# Patient Record
Sex: MALE | Race: WHITE | NOT HISPANIC OR LATINO | Employment: OTHER | URBAN - METROPOLITAN AREA
[De-identification: names, ages, dates, MRNs, and addresses within clinical notes are randomized per-mention and may not be internally consistent; named-entity substitution may affect disease eponyms.]

---

## 2018-01-25 ENCOUNTER — OFFICE VISIT (OUTPATIENT)
Dept: ORTHOPEDIC SURGERY | Facility: CLINIC | Age: 73
End: 2018-01-25

## 2018-01-25 VITALS — BODY MASS INDEX: 28.91 KG/M2 | HEIGHT: 69 IN | TEMPERATURE: 98.3 F | WEIGHT: 195.2 LBS

## 2018-01-25 DIAGNOSIS — M17.12 PRIMARY OSTEOARTHRITIS OF LEFT KNEE: Primary | ICD-10-CM

## 2018-01-25 PROCEDURE — 99203 OFFICE O/P NEW LOW 30 MIN: CPT | Performed by: ORTHOPAEDIC SURGERY

## 2018-01-25 RX ORDER — GABAPENTIN 100 MG/1
100 CAPSULE ORAL AS NEEDED
COMMUNITY
Start: 2016-12-12 | End: 2022-02-21

## 2018-01-25 RX ORDER — SODIUM PHOSPHATE,MONO-DIBASIC 19G-7G/118
ENEMA (ML) RECTAL
COMMUNITY
End: 2020-02-20

## 2018-01-25 RX ORDER — EZETIMIBE 10 MG/1
10 TABLET ORAL NIGHTLY
Refills: 5 | COMMUNITY
Start: 2017-12-27

## 2018-01-25 RX ORDER — NEBIVOLOL HYDROCHLORIDE 2.5 MG/1
2.5 TABLET ORAL DAILY
Refills: 10 | COMMUNITY
Start: 2017-10-30 | End: 2022-02-21

## 2018-01-25 RX ORDER — MELATONIN
2000 DAILY
COMMUNITY
End: 2020-02-20

## 2018-01-25 RX ORDER — ASPIRIN 81 MG/1
81 TABLET, CHEWABLE ORAL DAILY
COMMUNITY
End: 2020-03-07 | Stop reason: HOSPADM

## 2018-01-25 NOTE — PROGRESS NOTES
Patient: Yunior Anguiano  YOB: 1945 72 y.o. male  Medical Record Number: [id number]    Chief Complaints:   Chief Complaint   Patient presents with   • Left Knee - Establish Care, Pain       History of Present Illness:Yunior Anguiano is a 72 y.o. male who presents with Complaints of intermittent left knee pain.  His right knee replaced by Dr. Obrien about 15 years ago and that has done well.  He does have some issues with his left knee when he arises from a seated position he feels as though the knee has to shift into place.  It takes him 30 seconds or so before he can get going.  Once he warms up he does okay and it really doesn't limit him.  He describes the general aching discomfort but it's not severe.  He still does daily exercises to keep his quad strong and gets around fairly well.    Allergies:   Allergies   Allergen Reactions   • Amlodipine Besylate Anaphylaxis and Hives   • Cefdinir      rash   • Erythromycin Base      vomits   • Losartan      Itching and it stopped with he stopped the losartan   • Statins      ACHES cannot take   Muscles get WEAK       Medications:   Current Outpatient Prescriptions   Medication Sig Dispense Refill   • aspirin 81 MG chewable tablet Chew 81 mg.     • BYSTOLIC 2.5 MG tablet TAKE 1 TABLET BY MOUTH ONCE DAILY  10   • cholecalciferol (VITAMIN D3) 1000 units tablet Take 1,000 Units by mouth Daily.     • gabapentin (NEURONTIN) 100 MG capsule Take 100 mg by mouth.     • glucosamine-chondroitin 500-400 MG capsule capsule Take  by mouth 3 (Three) Times a Day With Meals.     • ZETIA 10 MG tablet TAKE 1/2 TAB (5MG) BY MOUTH DAILY  5     No current facility-administered medications for this visit.          The following portions of the patient's history were reviewed and updated as appropriate: allergies, current medications, past family history, past medical history, past social history, past surgical history and problem list.    Review of Systems:   A 14 point  "review of systems was performed. All systems negative except pertinent positives/negative listed in HPI above    Physical Exam:   Vitals:    01/25/18 0845   Temp: 98.3 °F (36.8 °C)   TempSrc: Temporal Artery    Weight: 88.5 kg (195 lb 3.2 oz)   Height: 174 cm (68.5\")       General: A and O x 3, ASA, NAD    SCLERA:    Normal    DENTITION:   Normal  Knee:  left    ALIGNMENT:     Varus  ,   Patella  tracks  midline    GAIT:    Antalgic With slight varus thrust    SKIN:    No abnormality    RANGE OF MOTION:   0  -  120   DEG    STRENGTH:   4  / 5    LIGAMENTS:    No varus / valgus instability.   Negative  Lachman.    MENISCUS:     Negative   Martha       DISTAL PULSES:    Paplable    DISTAL SENSATION :   Intact    LYMPHATICS:     No   lymphadenopathy    OTHER:          - no  effusion      - Crepitance with ROM          Radiology:  Xrays 3views left knee (ap,lateral, sunrise) recently taken at outside institution were reviewed demonstrating advanced varus osteoarthritis with bone on bone articulation, subchondral cysts, and periarticular osteophytes of the left knee.  There is a well-positioned right total knee without complicating factors noted.  There are no previous films for comparison.    Assessment/Plan: Left knee advanced OA.  His symptoms are surprisingly well controlled.  Continue quad strengthening exercises.  I like to check him back in about 6 months no x-rays needed.  He does understand he has severe advanced arthritis and likely will progress to knee replacement relatively soon.      Obed Ramos MD  1/25/2018  "

## 2018-09-20 ENCOUNTER — OFFICE VISIT (OUTPATIENT)
Dept: ORTHOPEDIC SURGERY | Facility: CLINIC | Age: 73
End: 2018-09-20

## 2018-09-20 VITALS — HEIGHT: 69 IN | BODY MASS INDEX: 28.88 KG/M2 | WEIGHT: 195 LBS | TEMPERATURE: 98.6 F

## 2018-09-20 DIAGNOSIS — G89.29 HIP PAIN, CHRONIC, RIGHT: ICD-10-CM

## 2018-09-20 DIAGNOSIS — Z96.651 HISTORY OF TOTAL KNEE ARTHROPLASTY, RIGHT: ICD-10-CM

## 2018-09-20 DIAGNOSIS — M25.551 HIP PAIN, CHRONIC, RIGHT: ICD-10-CM

## 2018-09-20 DIAGNOSIS — M17.12 PRIMARY OSTEOARTHRITIS OF LEFT KNEE: Primary | ICD-10-CM

## 2018-09-20 PROCEDURE — 99213 OFFICE O/P EST LOW 20 MIN: CPT | Performed by: ORTHOPAEDIC SURGERY

## 2018-09-20 PROCEDURE — 73562 X-RAY EXAM OF KNEE 3: CPT | Performed by: ORTHOPAEDIC SURGERY

## 2018-09-20 PROCEDURE — 73502 X-RAY EXAM HIP UNI 2-3 VIEWS: CPT | Performed by: ORTHOPAEDIC SURGERY

## 2018-09-20 RX ORDER — MELOXICAM 15 MG/1
TABLET ORAL
Qty: 30 TABLET | Refills: 3 | Status: SHIPPED | OUTPATIENT
Start: 2018-09-20 | End: 2020-02-20

## 2018-09-20 RX ORDER — HYDROCODONE BITARTRATE AND ACETAMINOPHEN 5; 325 MG/1; MG/1
TABLET ORAL
COMMUNITY
Start: 2018-03-12 | End: 2020-02-20

## 2018-09-20 NOTE — PROGRESS NOTES
Patient: Yunior Anguiano  YOB: 1945 72 y.o. male  Medical Record Number: [id number]    Chief Complaints:   Chief Complaint   Patient presents with   • Left Knee - Follow-up   • Right Knee - Pain   • Right Hip - Pain       History of Present Illness:Yunior Anguiano is a 72 y.o. male who presents with complaints of bilateral knee pain.  Right is more recent.  He began to develop pain about 2 weeks she'll when he was lifting multiple bags of 40 pound daily R.  He noticed a soreness about the proximal aspect of the knee joint after doing this.  Also complains of some chronic left medial knee pain which is moderate to severe at times.  He states the knee was to give out on him at times.  Pain is described as a constant ache worse with activity.  Also complains of moderate intermittent ache around the hip and groin region on the right side worse with activity.    Allergies:   Allergies   Allergen Reactions   • Amlodipine Besylate Anaphylaxis and Hives   • Cefdinir      rash   • Erythromycin Base      vomits   • Losartan      Itching and it stopped with he stopped the losartan   • Statins      ACHES cannot take   Muscles get WEAK       Medications:   Current Outpatient Prescriptions   Medication Sig Dispense Refill   • aspirin 81 MG chewable tablet Chew 81 mg.     • BYSTOLIC 2.5 MG tablet TAKE 1 TABLET BY MOUTH ONCE DAILY  10   • cholecalciferol (VITAMIN D3) 1000 units tablet Take 1,000 Units by mouth Daily.     • gabapentin (NEURONTIN) 100 MG capsule Take 100 mg by mouth.     • glucosamine-chondroitin 500-400 MG capsule capsule Take  by mouth 3 (Three) Times a Day With Meals.     • ZETIA 10 MG tablet TAKE 1/2 TAB (5MG) BY MOUTH DAILY  5   • HYDROcodone-acetaminophen (NORCO) 5-325 MG per tablet TAKE 1 TABLET BY MOUTH EVERY 6 HRS FOR 7 DAYS AS NEEDED FOR PAIN       No current facility-administered medications for this visit.          The following portions of the patient's history were reviewed and  "updated as appropriate: allergies, current medications, past family history, past medical history, past social history, past surgical history and problem list.    Review of Systems:   A 14 point review of systems was performed. All systems negative except pertinent positives/negative listed in HPI above    Physical Exam:   Vitals:    09/20/18 1105   Temp: 98.6 °F (37 °C)   TempSrc: Temporal Artery    Weight: 88.5 kg (195 lb)   Height: 175.3 cm (69\")       General: A and O x 3, ASA, NAD    SCLERA:    Normal    DENTITION:   Normal  Knee:  right    ALIGNMENT:     Neutral  ,   Patella  tracks  Midline without crepitance    GAIT:    Nonantalgic    SKIN:    Well healed midline incision, no erythema or fluctuance    RANGE OF MOTION:   0  -  120   DEG    STRENGTH:   4+  / 5    LIGAMENTS:    No varus / valgus instability.   No  Flexion   instability.       DISTAL PULSES:    Paplable    DISTAL SENSATION :   Intact    LYMPHATICS:     No   lymphadenopathy    OTHER:     1+   Effusion      Calf soft / nontender ,   Negative Nelson's sign               Radiology:  Xrays 3views both knees(ap,lateral, sunrise) were ordered and reviewed for evaluation of knee pain demonstrating left knee advanced varus osteoarthritis with bone on bone articulation, subchondral cysts, and periarticular osteophytes.  The right knee shows well-positioned total knee the joint polyethylene space is somewhat thin but not asymmetric.  The anterior flange of the femoral component is moderately prominent.  I compared these x-rays to previous knee x-rays and there has been no significant change    X-rays 2 views of the right hip AP and lateral ordered and reviewed for rotation hip pain.  He does have a prominent CAM lesion which may be causing some impingement.  There are no previous films for comparison    Assessment/Plan: Left knee advanced end-stage osteoarthritis.  I'll send him to physical therapy.  He will go on to require knee replacement in the " relatively near future.  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  There is a chance down the road this will require polyethylene exchange will continue to monitor for now    Right hip impingement with intermittent irritation I recommended hip stretching exercises.      Obed Ramos MD  9/20/2018

## 2018-09-26 ENCOUNTER — HOSPITAL ENCOUNTER (OUTPATIENT)
Dept: PHYSICAL THERAPY | Facility: HOSPITAL | Age: 73
Setting detail: THERAPIES SERIES
Discharge: HOME | End: 2018-09-26
Attending: ORTHOPAEDIC SURGERY

## 2018-09-26 DIAGNOSIS — M25.561 ACUTE PAIN OF RIGHT KNEE: Primary | ICD-10-CM

## 2018-09-26 DIAGNOSIS — M25.551 RIGHT HIP PAIN: ICD-10-CM

## 2018-09-26 DIAGNOSIS — M17.12 PRIMARY OSTEOARTHRITIS OF LEFT KNEE: ICD-10-CM

## 2018-09-26 DIAGNOSIS — R26.2 DIFFICULTY WALKING: ICD-10-CM

## 2018-09-26 PROCEDURE — 97110 THERAPEUTIC EXERCISES: CPT | Performed by: PHYSICAL THERAPIST

## 2018-09-26 PROCEDURE — G8979 MOBILITY GOAL STATUS: HCPCS | Performed by: PHYSICAL THERAPIST

## 2018-09-26 PROCEDURE — G8978 MOBILITY CURRENT STATUS: HCPCS | Performed by: PHYSICAL THERAPIST

## 2018-09-26 PROCEDURE — 97162 PT EVAL MOD COMPLEX 30 MIN: CPT | Performed by: PHYSICAL THERAPIST

## 2018-09-26 NOTE — THERAPY EVALUATION
Outpatient Physical Therapy Ortho Initial Evaluation  [facility]     Patient Name: Yunior Anguiano  : 1945  MRN: [id number]  Today's Date: 2018      Visit Date: 2018    There is no problem list on file for this patient.       Past Medical History:   Diagnosis Date   • Cardiac disease    • Sleep apnea         Past Surgical History:   Procedure Laterality Date   • CHOLECYSTECTOMY     • CORONARY STENT PLACEMENT      mid LAD   • FOOT SURGERY Left     bone growth removal   • HEMORRHOIDECTOMY     • JOINT REPLACEMENT Right     knee   • KNEE SURGERY Right     ACL repair       Visit Dx:     ICD-10-CM ICD-9-CM   1. Acute pain of right knee M25.561 719.46   2. Right hip pain M25.551 719.45   3. Primary osteoarthritis of left knee M17.12 715.16   4. Difficulty walking R26.2 719.7             Patient History     Row Name 18 0900             History    Chief Complaint Pain;Difficulty Walking;Difficulty with daily activities  -MP      Type of Pain Hip pain;Knee pain  -MP      Date Current Problem(s) Began 18  -MP      Brief Description of Current Complaint Manjinder reports that on 2018 he was lifting 42 lbs bags of cat litter and after seversl trips to and fro from his car the R hip and knee started hurting for one hour.  He was seen by Dr. MADALYN Ramos 6 days ago and was presribed Mobic which has helped.  Pain is currently intermittent and peak pain occurs at a 7/10 level.  Squatting seems to provoke the severe pain.  His sleep is not normal due to the pain but that is diminishing.  PMH:  R TKA , torn ACL with surgery for R knee in , bone removal from great toe on L foot in .  Fractured R fitth metatarsal x 2, once as a teenager and once later in adulthood,  MI with stint , HTN controlled by medication, hyperlipidemia controlled by medication, cholecystectomy   R CTR 2018  -MP      Patient/Caregiver Goals Relieve pain;Know what to do to  help the symptoms;Return to prior level of function  -MP      Current Tobacco Use zero  -MP      Smoking Status zero  -MP      Patient's Rating of General Health Good  -MP      Hand Dominance right-handed  -MP      Occupation/sports/leisure activities Retired psychologist.  He enjoys golfing, swimming, gardening and reading  -MP         Fall Risk Assessment    Any falls in the past year: No  -MP      Number of falls reported in the last 12 months zero  -MP         Services    Are you currently receiving Home Health services No  -MP         Daily Activities    Primary Language English  -MP      How does patient learn best? Demonstration;Other (comment)   Performing the activity  -MP      Pt Participated in POC and Goals Yes  -MP         Safety    Are you being hurt, hit, or frightened by anyone at home or in your life? No  -MP      Are you being neglected by a caregiver No  -MP        User Key  (r) = Recorded By, (t) = Taken By, (c) = Cosigned By    Initials Name Provider Type    MP Joon Kraft, PT Physical Therapist                PT Ortho     Row Name 09/26/18 0900       Posture/Observations    Posture/Observations Comments B pes planus, B hallux valgus deformities, L greater than R in standing and during the gait cycle  -MP       Quarter Clearing    Quarter Clearing Lower Quarter Clearing  -MP       DTR- Lower Quarter Clearing    Patellar tendon (L2-4) Bilateral:;1- Minimal response  -MP    Achilles tendon (S1-2) Bilateral:;1- Minimal response  -MP       Pathological Reflexes- Lower Quarter Clearing    Clonus Negative  -MP    Babinski Negative  -MP       Sensory Screen for Light Touch- Lower Quarter Clearing    L1 (inguinal area) Bilateral:;Intact  -MP    L2 (anterior mid thigh) Bilateral:;Intact  -MP    L3 (distal anterior thigh) Bilateral:;Intact  -MP    L4 (medial lower leg/foot) Bilateral:;Intact  -MP    L5 (lateral lower leg/great toe) Bilateral:;Intact  -MP    S1 (bottom of foot) Bilateral:;Intact  -MP        General ROM    GENERAL ROM COMMENTS In supine L Knee -7-0-120 degrees of flexion and R 0-112 degrees of flexion, in sitting B ankles WNL, in standing B hip minimal to moderate decreases.  -MP       MMT (Manual Muscle Testing)    General MMT Comments B ankle inversion 4/5 and eversion B 5/5  -MP       Pathomechanics    Pathomechanics Comments R hip positive inner quadrant test, L negative  -MP       Gait/Stairs Assessment/Training    Comment (Gait/Stairs) He ambulated on level surface, no assistive device, with a normal gait pattern  -MP      User Key  (r) = Recorded By, (t) = Taken By, (c) = Cosigned By    Initials Name Provider Type    Joon Aguilar, PT Physical Therapist        Therapy Education  Education Details: Heel slides and quad sets, roll at knee, were issued to begin his HEP.  Given: HEP, Symptoms/condition management  Program: New  How Provided: Written  Provided to: Patient  Level of Understanding: Teach back education performed           PT OP Goals     Row Name 09/26/18 1000          PT Short Term Goals    STG Date to Achieve 10/26/18  -MP     STG 1 Yunior is progressed with land based therapeutic exercises such as low intensity step ups, seated long arc quads lying short arc quads and lying hip clams.  -MP     STG 1 Progress New  -MP     STG 2 Yunior is introduced to aqua theapy exercises and is indiependent in their application.  -MP     STG 2 Progress New  -MP     STG 3 KOS disability improves to 24%  -MP     STG 3 Progress New  -MP        Long Term Goals    LTG Date to Achieve 12/25/18  -MP     LTG 1 AROM of the each knee improves by 5 degrees.  -MP     LTG 1 Progress New  -MP     LTG 2 KOS disability is below 19%.  -MP     LTG 2 Progress New  -MP     LTG 3 Mick is independent with a HEP and all self care education.  -MP     LTG 3 Progress New  -MP        Time Calculation    PT Goal Re-Cert Due Date 10/26/18  -MP       User Key  (r) = Recorded By, (t) = Taken By, (c) = Cosigned By     Initials Name Provider Type    MP Joon Kraft PT Physical Therapist                      Exercises     Row Name 09/26/18 1000             Total Minutes    38213 - PT Therapeutic Exercise Minutes 10  -MP         Exercise 1    Exercise Name 1 In supine, heel slides x 10 and quad sets, towel roll at knee, x 10 B  -MP        User Key  (r) = Recorded By, (t) = Taken By, (c) = Cosigned By    Initials Name Provider Type    Joon Aguilar PT Physical Therapist           Outcome Measure Options: Knee Outcome Score- ADL  Knee Outcome Score  Knee Outcome Score Comments: 56/80, 30% disability      Time Calculation:     Therapy Suggested Charges     Code   Minutes Charges    90842 (CPT®) Hc Pt Neuromusc Re Education Ea 15 Min      77645 (CPT®) Hc Pt Ther Proc Ea 15 Min 10 1    57226 (CPT®) Hc Gait Training Ea 15 Min      98407 (CPT®) Hc Pt Therapeutic Act Ea 15 Min      00902 (CPT®) Hc Pt Manual Therapy Ea 15 Min      67652 (CPT®) Hc Pt Ther Massage- Per 15 Min      44608 (CPT®) Hc Pt Iontophoresis Ea 15 Min      17751 (CPT®) Hc Pt Elec Stim Ea-Per 15 Min      53105 (CPT®) Hc Pt Ultrasound Ea 15 Min      26713 (CPT®) Hc Pt Self Care/Mgmt/Train Ea 15 Min      74024 (CPT®) Hc Pt Prosthetic (S) Train Initial Encounter, Each 15 Min      42119 (CPT®) Hc Orthotic(S) Mgmt/Train Initial Encounter, Each 15min      96600 (CPT®) Hc Pt Aquatic Therapy Ea 15 Min      50013 (CPT®) Hc Pt Orthotic(S)/Prosthetic(S) Encounter, Each 15 Min      Total  10 1          Start Time: 0900  Stop Time: 0945  Time Calculation (min): 45 min     Therapy Charges for Today     Code Description Service Date Service Provider Modifiers Qty    59959744209 HC PT MOBILITY CURRENT 9/26/2018 Joon Kraft, PT GP, CJ 1    96910200523 HC PT MOBILITY PROJECTED 9/26/2018 Joon Kraft, PT GP, CI 1    87818102408 HC PT THER PROC EA 15 MIN 9/26/2018 Joon Kraft, PT GP 1    09455494973 HC PT EVAL MOD COMPLEXITY 2 9/26/2018 Joon Kraft, PT GP 1          PT  G-Codes  Outcome Measure Options: Knee Outcome Score- ADL  Functional Limitation: Mobility: Walking and moving around  Mobility: Walking and Moving Around Current Status (): At least 20 percent but less than 40 percent impaired, limited or restricted  Mobility: Walking and Moving Around Goal Status (): At least 1 percent but less than 20 percent impaired, limited or restricted         Joon Kraft, PT  9/26/2018

## 2018-10-02 ENCOUNTER — HOSPITAL ENCOUNTER (OUTPATIENT)
Dept: PHYSICAL THERAPY | Facility: HOSPITAL | Age: 73
Setting detail: THERAPIES SERIES
Discharge: HOME | End: 2018-10-02

## 2018-10-02 DIAGNOSIS — M25.551 RIGHT HIP PAIN: Primary | ICD-10-CM

## 2018-10-02 DIAGNOSIS — M17.12 PRIMARY OSTEOARTHRITIS OF LEFT KNEE: ICD-10-CM

## 2018-10-02 DIAGNOSIS — M25.561 ACUTE PAIN OF RIGHT KNEE: ICD-10-CM

## 2018-10-02 DIAGNOSIS — R26.2 DIFFICULTY WALKING: ICD-10-CM

## 2018-10-02 PROCEDURE — 97110 THERAPEUTIC EXERCISES: CPT | Performed by: PHYSICAL THERAPIST

## 2018-10-02 NOTE — THERAPY TREATMENT NOTE
Outpatient Physical Therapy Ortho Treatment Note  [facility]     Patient Name: Yunior Anguiano  : 1945  MRN: [id number]  Today's Date: 10/2/2018      Visit Date: 10/02/2018    Visit Dx:    ICD-10-CM ICD-9-CM   1. Right hip pain M25.551 719.45   2. Primary osteoarthritis of left knee M17.12 715.16   3. Difficulty walking R26.2 719.7   4. Acute pain of right knee M25.561 719.46       There is no problem list on file for this patient.       Past Medical History:   Diagnosis Date   • Cardiac disease    • Sleep apnea         Past Surgical History:   Procedure Laterality Date   • CHOLECYSTECTOMY     • CORONARY STENT PLACEMENT      mid LAD   • FOOT SURGERY Left     bone growth removal   • HEMORRHOIDECTOMY     • JOINT REPLACEMENT Right     knee   • KNEE SURGERY Right     ACL repair             PT Ortho     Row Name 10/02/18 1700       Posture/Observations    Posture/Observations Comments Inner quadrant test of the hips, L negative, R postive to pain and reduced ease in motion.  -MP       Neural Tension Signs- Lower Quarter Clearing    Slump Negative  -MP      User Key  (r) = Recorded By, (t) = Taken By, (c) = Cosigned By    Initials Name Provider Type    Joon Aguilar, PT Physical Therapist                PT Assessment/Plan     Row Name 10/02/18 3530          PT Assessment    Assessment Comments His R LE has seemingly gotten better with protection and the L LE tolerated today's treatment well.  The R hip joint tested positive to the inner quadrant test which may indicate OA of the R hip joint.  -MP        PT Plan    PT Plan Comments Monitor the effects of today's treatment and progress slowly.  -MP       User Key  (r) = Recorded By, (t) = Taken By, (c) = Cosigned By    Initials Name Provider Type    Jono Aguilar, PT Physical Therapist                Exercises     Row Name 10/02/18 1700             Total Minutes    36585 - PT Therapeutic Exercise Minutes 45  -MP         Exercise  1    Exercise Name 1 Refer to land flow sheet  -MP      Additional Comments Progressed therapeutic exercises for the L knee with short arc quads x 10, long arc quads x 10, gastrocnemius stretch 30s x 3 B and the NuStep x 5 minutes.  -MP        User Key  (r) = Recorded By, (t) = Taken By, (c) = Cosigned By    Initials Name Provider Type    Joon Aguilar, PT Physical Therapist                PT OP Goals     Row Name 10/02/18 1700          PT Short Term Goals    STG Date to Achieve 10/26/18  -MP     STG 1 Yunior is progressed with land based therapeutic exercises such as low intensity step ups, seated long arc quads lying short arc quads and lying hip clams.  -MP     STG 1 Progress Ongoing  -MP     STG 2 Yunior is introduced to aqua theapy exercises and is indiependent in their application.  -MP     STG 2 Progress Ongoing  -MP     STG 3 KOS disability improves to 24%  -MP     STG 3 Progress Ongoing  -MP        Long Term Goals    LTG Date to Achieve 12/25/18  -MP     LTG 1 AROM of the each knee improves by 5 degrees.  -MP     LTG 1 Progress Ongoing  -MP     LTG 2 KOS disability is below 19%.  -MP     LTG 2 Progress Ongoing  -MP     LTG 3 Mick is independent with a HEP and all self care education.  -MP     LTG 3 Progress Ongoing  -MP       User Key  (r) = Recorded By, (t) = Taken By, (c) = Cosigned By    Initials Name Provider Type    Joon Aguilar, PT Physical Therapist          Therapy Education  Education Details: Short and long arcs were added to her HEP.  Given: HEP, Symptoms/condition management  Program: New  How Provided: Written  Level of Understanding: Teach back education performed     Time Calculation:   Start Time: 1525  Stop Time: 1610  Time Calculation (min): 45 min  Therapy Suggested Charges     Code   Minutes Charges    46485 (CPT®) Hc Pt Neuromusc Re Education Ea 15 Min      29331 (CPT®) Hc Pt Ther Proc Ea 15 Min 45 3    56735 (CPT®) Hc Gait Training Ea 15 Min      55008 (CPT®) Hc Pt  Therapeutic Act Ea 15 Min      00838 (CPT®) Hc Pt Manual Therapy Ea 15 Min      89555 (CPT®) Hc Pt Ther Massage- Per 15 Min      00240 (CPT®) Hc Pt Iontophoresis Ea 15 Min      76968 (CPT®) Hc Pt Elec Stim Ea-Per 15 Min      43291 (CPT®) Hc Pt Ultrasound Ea 15 Min      46714 (CPT®) Hc Pt Self Care/Mgmt/Train Ea 15 Min      67455 (CPT®) Hc Pt Prosthetic (S) Train Initial Encounter, Each 15 Min      16814 (CPT®) Hc Orthotic(S) Mgmt/Train Initial Encounter, Each 15min      20162 (CPT®) Hc Pt Aquatic Therapy Ea 15 Min      82128 (CPT®) Hc Pt Orthotic(S)/Prosthetic(S) Encounter, Each 15 Min       (CPT®) Hc Pt Electrical Stim Unattended      Total  45 3        Therapy Charges for Today     Code Description Service Date Service Provider Modifiers Qty    29244618637 HC PT THER PROC EA 15 MIN 10/2/2018 Joon Kraft, PT GP 3          Joon Kraft PT  10/2/2018

## 2018-10-04 ENCOUNTER — HOSPITAL ENCOUNTER (OUTPATIENT)
Dept: PHYSICAL THERAPY | Facility: HOSPITAL | Age: 73
Setting detail: THERAPIES SERIES
Discharge: HOME | End: 2018-10-04

## 2018-10-04 DIAGNOSIS — M17.12 PRIMARY OSTEOARTHRITIS OF LEFT KNEE: ICD-10-CM

## 2018-10-04 DIAGNOSIS — M25.551 RIGHT HIP PAIN: Primary | ICD-10-CM

## 2018-10-04 DIAGNOSIS — R26.2 DIFFICULTY WALKING: ICD-10-CM

## 2018-10-04 DIAGNOSIS — M25.561 ACUTE PAIN OF RIGHT KNEE: ICD-10-CM

## 2018-10-04 PROCEDURE — 97110 THERAPEUTIC EXERCISES: CPT | Performed by: PHYSICAL THERAPIST

## 2018-10-04 PROCEDURE — 97140 MANUAL THERAPY 1/> REGIONS: CPT | Performed by: PHYSICAL THERAPIST

## 2018-10-04 NOTE — THERAPY TREATMENT NOTE
Outpatient Physical Therapy Ortho Treatment Note  [facility]     Patient Name: Yunior Anguiano  : 1945  MRN: [id number]  Today's Date: 10/4/2018      Visit Date: 10/04/2018    Visit Dx:    ICD-10-CM ICD-9-CM   1. Right hip pain M25.551 719.45   2. Primary osteoarthritis of left knee M17.12 715.16   3. Difficulty walking R26.2 719.7   4. Acute pain of right knee M25.561 719.46       There is no problem list on file for this patient.       Past Medical History:   Diagnosis Date   • Cardiac disease    • Sleep apnea         Past Surgical History:   Procedure Laterality Date   • CHOLECYSTECTOMY     • CORONARY STENT PLACEMENT      mid LAD   • FOOT SURGERY Left     bone growth removal   • HEMORRHOIDECTOMY     • JOINT REPLACEMENT Right     knee   • KNEE SURGERY Right     ACL repair             PT Ortho     Row Name 10/02/18 1700       Posture/Observations    Posture/Observations Comments Inner quadrant test of the hips, L negative, R postive to pain and reduced ease in motion.  -MP       Neural Tension Signs- Lower Quarter Clearing    Slump Negative  -MP      User Key  (r) = Recorded By, (t) = Taken By, (c) = Cosigned By    Initials Name Provider Type    Joon Aguilar, PT Physical Therapist                  PT Assessment/Plan     Row Name 10/04/18 1400          PT Assessment    Assessment Comments Yunior tolerated treatment well.  The R quadriceps had multiple spots that were fibrotic and he tolerated the STM very well.  It appears that he may have strained the R quadriceps and is just now improving, thus able to tolerate the heel slide better today than before with the R LE.  The aqua therapy should help that healing process further as well as provide education for future use for the kinetic chain.  -MP        PT Plan    PT Plan Comments He is in the water for 2 weeks and returns to land afterwards.  -MP       User Key  (r) = Recorded By, (t) = Taken By, (c) = Cosigned By     Initials Name Provider Type    Joon Aguilar, PT Physical Therapist                    Exercises     Row Name 10/04/18 1300             Subjective Comments    Subjective Comments Yunior reports that his R LE is still doing better.  He notices a little tenderness in the R quadriceps tendon area.    -MP         Subjective Pain    Able to rate subjective pain? yes  -MP      Pre-Treatment Pain Level 1  -MP      Post-Treatment Pain Level 0  -MP      Subjective Pain Comment Yunior reported that the R LE felt looser after treatment.  -MP         Total Minutes    79771 - PT Therapeutic Exercise Minutes 35  -MP      59340 - PT Manual Therapy Minutes 10  -MP         Exercise 1    Exercise Name 1 Refer to land flow sheet  -MP      Additional Comments Progressed therapeutic exercises with SLRs x 10 for the L knee and re-introduced heel slides, x 10, for the R knee.    -MP        User Key  (r) = Recorded By, (t) = Taken By, (c) = Cosigned By    Initials Name Provider Type    Joon Aguilar, PT Physical Therapist               Manual Rx (last 36 hours)      Manual Treatments     Row Name 10/04/18 1300             Total Minutes    88710 - PT Manual Therapy Minutes 10  -MP         Manual Rx 1    Manual Rx 1 Location R quadriceps  -MP      Manual Rx 1 Type Deep STM  -MP      Manual Rx 1 Duration 10  -MP        User Key  (r) = Recorded By, (t) = Taken By, (c) = Cosigned By    Initials Name Provider Type    Joon Aguilar, PT Physical Therapist                PT OP Goals     Row Name 10/04/18 1400          PT Short Term Goals    STG Date to Achieve 10/26/18  -MP     STG 1 Yunior is progressed with land based therapeutic exercises such as low intensity step ups, seated long arc quads lying short arc quads and lying hip clams.  -MP     STG 1 Progress Ongoing  -MP     STG 2 Yunior is introduced to aqua theapy exercises and is indiependent in their application.  -MP     STG 2 Progress Ongoing  -MP     STG 3 KOS disability  improves to 24%  -MP     STG 3 Progress Ongoing  -MP        Long Term Goals    LTG Date to Achieve 12/25/18  -MP     LTG 1 AROM of the each knee improves by 5 degrees.  -MP     LTG 1 Progress Ongoing  -MP     LTG 2 KOS disability is below 19%.  -MP     LTG 2 Progress Ongoing  -MP     LTG 3 Mick is independent with a HEP and all self care education.  -MP     LTG 3 Progress Ongoing  -MP       User Key  (r) = Recorded By, (t) = Taken By, (c) = Cosigned By    Initials Name Provider Type    Joon Aguilar, PT Physical Therapist          Therapy Education  Education Details: Progressed his HEP with SLRs for the L LE and heel slides were re-introduced for the R LE  Given: HEP, Symptoms/condition management  Program: New  How Provided: Written, Verbal  Provided to: Patient  Level of Understanding: Teach back education performed       Time Calculation:   Start Time: 1200  Stop Time: 1245  Time Calculation (min): 45 min  Therapy Suggested Charges     Code   Minutes Charges    78716 (CPT®) Hc Pt Neuromusc Re Education Ea 15 Min      69464 (CPT®) Hc Pt Ther Proc Ea 15 Min 35 2    61734 (CPT®) Hc Gait Training Ea 15 Min      04819 (CPT®) Hc Pt Therapeutic Act Ea 15 Min      89034 (CPT®) Hc Pt Manual Therapy Ea 15 Min 10 1    47863 (CPT®) Hc Pt Ther Massage- Per 15 Min      26290 (CPT®) Hc Pt Iontophoresis Ea 15 Min      61856 (CPT®) Hc Pt Elec Stim Ea-Per 15 Min      36778 (CPT®) Hc Pt Ultrasound Ea 15 Min      53018 (CPT®) Hc Pt Self Care/Mgmt/Train Ea 15 Min      47243 (CPT®) Hc Pt Prosthetic (S) Train Initial Encounter, Each 15 Min      78876 (CPT®) Hc Orthotic(S) Mgmt/Train Initial Encounter, Each 15min      39505 (CPT®) Hc Pt Aquatic Therapy Ea 15 Min      12288 (CPT®) Hc Pt Orthotic(S)/Prosthetic(S) Encounter, Each 15 Min       (CPT®) Hc Pt Electrical Stim Unattended      Total  45 3        Therapy Charges for Today     Code Description Service Date Service Provider Modifiers Qty    01376714859 HC PT THER PROC EA  15 MIN 10/4/2018 Joon Kraft, PT GP 2    86190414640 HC PT MANUAL THERAPY EA 15 MIN 10/4/2018 Joon Kraft, PT GP 1            Joon Kraft, PT  10/4/2018

## 2018-10-08 ENCOUNTER — HOSPITAL ENCOUNTER (OUTPATIENT)
Dept: PHYSICAL THERAPY | Facility: HOSPITAL | Age: 73
Setting detail: THERAPIES SERIES
Discharge: HOME | End: 2018-10-08

## 2018-10-08 DIAGNOSIS — M25.561 ACUTE PAIN OF RIGHT KNEE: ICD-10-CM

## 2018-10-08 DIAGNOSIS — M25.551 RIGHT HIP PAIN: Primary | ICD-10-CM

## 2018-10-08 DIAGNOSIS — M17.12 PRIMARY OSTEOARTHRITIS OF LEFT KNEE: ICD-10-CM

## 2018-10-08 DIAGNOSIS — R26.2 DIFFICULTY WALKING: ICD-10-CM

## 2018-10-08 PROCEDURE — 97113 AQUATIC THERAPY/EXERCISES: CPT | Performed by: PHYSICAL THERAPIST

## 2018-10-08 NOTE — THERAPY TREATMENT NOTE
Outpatient Physical Therapy Ortho Treatment Note  [facility]     Patient Name: Yunior Anguiano  : 1945  MRN: [id number]  Today's Date: 10/8/2018      Visit Date: 10/08/2018    Visit Dx:    ICD-10-CM ICD-9-CM   1. Right hip pain M25.551 719.45   2. Primary osteoarthritis of left knee M17.12 715.16   3. Difficulty walking R26.2 719.7   4. Acute pain of right knee M25.561 719.46       There is no problem list on file for this patient.       Past Medical History:   Diagnosis Date   • Cardiac disease    • Sleep apnea         Past Surgical History:   Procedure Laterality Date   • CHOLECYSTECTOMY     • CORONARY STENT PLACEMENT      mid LAD   • FOOT SURGERY Left     bone growth removal   • HEMORRHOIDECTOMY     • JOINT REPLACEMENT Right     knee   • KNEE SURGERY Right     ACL repair                             PT Assessment/Plan     Row Name 10/08/18 1632          PT Assessment    Assessment Comments First session in aquatic environment for education/instruction on LE flexibility and strengthening program. Mr. Anguiano reports he swims at [employer]  and would like copies of instructions for him to perform independently upon completion of formal therapy.   -CK       User Key  (r) = Recorded By, (t) = Taken By, (c) = Cosigned By    Initials Name Provider Type    CK Daniel Saldana, PT Physical Therapist                    Exercises     Row Name 10/08/18 1600             Subjective Comments    Subjective Comments I am doing 100% better.   -CK         Subjective Pain    Able to rate subjective pain? yes  -CK      Pre-Treatment Pain Level 0  -CK      Post-Treatment Pain Level 0  -CK         Aquatics    80230 - Aquatic Therapy Minutes 45  -CK         Aquatics LE    Water Walk forward;side;backward   x3 laps with TA  -CK      Stretch 1 B calf stretch 2 x 20’  -CK      Stretch 2 B piriformis stretch 2 x 20”  -CK      Stretch 3 B HS stretch/sweep with LN x12’  -CK      Stretch Other 1 B quad stretch  2 x 20 sec LN  -CK      Stretch Other 2 DKTC stretch on wall 5 x 10”  -CK      Abdominals noodle   LN x 15  -CK      Hip Abd/Add B x 15  -CK      March in Place performed walking x 2 laps  -CK      Toe/Heel Raises tip toes/tandem walking x 2 lap  -CK      Uni-Clock B leg press, LN x 15  -CK      Step Ups B hip circles cw/ccw 10/10  -CK      Bicycle seated at bench x 2 min  -CK      Flutter/Scissor seated at bench 20/-  -CK        User Key  (r) = Recorded By, (t) = Taken By, (c) = Cosigned By    Initials Name Provider Type    CK Daniel Saldana, PT Physical Therapist                                            Time Calculation:   Start Time: 1600  Stop Time: 1645  Time Calculation (min): 45 min  Total Timed Code Minutes- PT: 45 minute(s)  Therapy Suggested Charges     Code   Minutes Charges    01058 (CPT®) Hc Pt Neuromusc Re Education Ea 15 Min      48528 (CPT®) Hc Pt Ther Proc Ea 15 Min      77335 (CPT®) Hc Gait Training Ea 15 Min      50391 (CPT®) Hc Pt Therapeutic Act Ea 15 Min      10299 (CPT®) Hc Pt Manual Therapy Ea 15 Min      84343 (CPT®) Hc Pt Ther Massage- Per 15 Min      89260 (CPT®) Hc Pt Iontophoresis Ea 15 Min      95694 (CPT®) Hc Pt Elec Stim Ea-Per 15 Min      06242 (CPT®) Hc Pt Ultrasound Ea 15 Min      79208 (CPT®) Hc Pt Self Care/Mgmt/Train Ea 15 Min      98390 (CPT®) Hc Pt Prosthetic (S) Train Initial Encounter, Each 15 Min      64219 (CPT®) Hc Orthotic(S) Mgmt/Train Initial Encounter, Each 15min      86616 (CPT®) Hc Pt Aquatic Therapy Ea 15 Min 45 3    94947 (CPT®) Hc Pt Orthotic(S)/Prosthetic(S) Encounter, Each 15 Min       (CPT®) Hc Pt Electrical Stim Unattended      Total  45 3        Therapy Charges for Today     Code Description Service Date Service Provider Modifiers Qty    61461660147 HC PT AQUATIC THERAPY EA 15 MIN 10/8/2018 Daniel Saldana, PT GP 3                    Daniel Saldana PT  10/8/2018

## 2018-10-10 ENCOUNTER — HOSPITAL ENCOUNTER (OUTPATIENT)
Dept: PHYSICAL THERAPY | Facility: HOSPITAL | Age: 73
Setting detail: THERAPIES SERIES
Discharge: HOME | End: 2018-10-10

## 2018-10-10 DIAGNOSIS — M25.551 RIGHT HIP PAIN: Primary | ICD-10-CM

## 2018-10-10 DIAGNOSIS — M17.12 PRIMARY OSTEOARTHRITIS OF LEFT KNEE: ICD-10-CM

## 2018-10-10 DIAGNOSIS — M25.561 ACUTE PAIN OF RIGHT KNEE: ICD-10-CM

## 2018-10-10 DIAGNOSIS — R26.2 DIFFICULTY WALKING: ICD-10-CM

## 2018-10-10 PROCEDURE — 97113 AQUATIC THERAPY/EXERCISES: CPT | Performed by: PHYSICAL THERAPIST

## 2018-10-10 NOTE — THERAPY TREATMENT NOTE
"    Outpatient Physical Therapy Ortho Treatment Note  [facility]     Patient Name: Yunior Anguiano  : 1945  MRN: [id number]  Today's Date: 10/10/2018      Visit Date: 10/10/2018    Visit Dx:    ICD-10-CM ICD-9-CM   1. Right hip pain M25.551 719.45   2. Primary osteoarthritis of left knee M17.12 715.16   3. Difficulty walking R26.2 719.7   4. Acute pain of right knee M25.561 719.46       There is no problem list on file for this patient.       Past Medical History:   Diagnosis Date   • Cardiac disease    • Sleep apnea         Past Surgical History:   Procedure Laterality Date   • CHOLECYSTECTOMY     • CORONARY STENT PLACEMENT      mid LAD   • FOOT SURGERY Left     bone growth removal   • HEMORRHOIDECTOMY     • JOINT REPLACEMENT Right     knee   • KNEE SURGERY Right     ACL repair                             PT Assessment/Plan     Row Name 10/10/18 7550          PT Assessment    Assessment Comments Mr. Anguiano requested \"the most necessary exercises\" for him to perform to manage his L knee pain and avoid replacement. Education on the importance of LE flexibility and hamstring, quad, and hip abduction strength. One session remaining for aquatic exercise instruction. Handouts to be given for him to perform independently at [facility].   -CK       User Key  (r) = Recorded By, (t) = Taken By, (c) = Cosigned By    Initials Name Provider Type    CK Daniel Saldana, PT Physical Therapist                    Exercises     Row Name 10/10/18 0236             Subjective Comments    Subjective Comments I felt good after last time, relaxed.  -CK         Subjective Pain    Able to rate subjective pain? yes  -CK      Pre-Treatment Pain Level 0  -CK      Post-Treatment Pain Level 0  -CK         Aquatics    46957 - Aquatic Therapy Minutes 45  -CK         Aquatics LE    Water Walk forward;side;backward   x3 laps with TA  -CK      Stretch 1 B calf stretch 2 x 20’  -CK      Stretch 3 B HS stretch/sweep with " LN x12’  -CK      Stretch Other 1 B quad stretch 2 x 20 sec LN  -CK      Abdominals noodle   LN x 15  -CK      Hip Abd/Add B x 15  -CK      March in Place performed walking x 2 laps  -CK      Toe/Heel Raises tip toes/tandem walking x 2 lap  -CK      Uni-Clock B leg press, LN x 15  -CK      Bicycle seated at bench x 2 min  -CK      Flutter/Scissor seated at bench 20/-  -CK        User Key  (r) = Recorded By, (t) = Taken By, (c) = Cosigned By    Initials Name Provider Type    CK Daniel Saldana, PT Physical Therapist                                            Time Calculation:   Start Time: 1645  Stop Time: 1730  Time Calculation (min): 45 min  Total Timed Code Minutes- PT: 45 minute(s)  Therapy Suggested Charges     Code   Minutes Charges    95726 (CPT®) Hc Pt Neuromusc Re Education Ea 15 Min      93786 (CPT®) Hc Pt Ther Proc Ea 15 Min      70853 (CPT®) Hc Gait Training Ea 15 Min      54817 (CPT®) Hc Pt Therapeutic Act Ea 15 Min      86939 (CPT®) Hc Pt Manual Therapy Ea 15 Min      30315 (CPT®) Hc Pt Ther Massage- Per 15 Min      43573 (CPT®) Hc Pt Iontophoresis Ea 15 Min      20181 (CPT®) Hc Pt Elec Stim Ea-Per 15 Min      61918 (CPT®) Hc Pt Ultrasound Ea 15 Min      92094 (CPT®) Hc Pt Self Care/Mgmt/Train Ea 15 Min      76739 (CPT®) Hc Pt Prosthetic (S) Train Initial Encounter, Each 15 Min      92997 (CPT®) Hc Orthotic(S) Mgmt/Train Initial Encounter, Each 15min      25639 (CPT®) Hc Pt Aquatic Therapy Ea 15 Min 45 3    49235 (CPT®) Hc Pt Orthotic(S)/Prosthetic(S) Encounter, Each 15 Min       (CPT®) Hc Pt Electrical Stim Unattended      Total  45 3        Therapy Charges for Today     Code Description Service Date Service Provider Modifiers Qty    38174140703 HC PT AQUATIC THERAPY EA 15 MIN 10/10/2018 Daniel Saldana, PT GP 3                    Daniel Saldana PT  10/10/2018

## 2018-10-15 ENCOUNTER — HOSPITAL ENCOUNTER (OUTPATIENT)
Dept: PHYSICAL THERAPY | Facility: HOSPITAL | Age: 73
Setting detail: THERAPIES SERIES
Discharge: HOME | End: 2018-10-15

## 2018-10-15 DIAGNOSIS — M17.12 PRIMARY OSTEOARTHRITIS OF LEFT KNEE: Primary | ICD-10-CM

## 2018-10-15 DIAGNOSIS — R26.2 DIFFICULTY WALKING: ICD-10-CM

## 2018-10-15 DIAGNOSIS — M25.561 ACUTE PAIN OF RIGHT KNEE: ICD-10-CM

## 2018-10-15 DIAGNOSIS — M25.551 RIGHT HIP PAIN: ICD-10-CM

## 2018-10-15 PROCEDURE — 97113 AQUATIC THERAPY/EXERCISES: CPT | Performed by: PHYSICAL THERAPIST

## 2018-10-15 NOTE — THERAPY TREATMENT NOTE
Outpatient Physical Therapy Ortho Treatment Note  [facility]     Patient Name: Yunior Anguiano  : 1945  MRN: [id number]  Today's Date: 10/15/2018      Visit Date: 10/15/2018    Visit Dx:    ICD-10-CM ICD-9-CM   1. Primary osteoarthritis of left knee M17.12 715.16   2. Difficulty walking R26.2 719.7   3. Acute pain of right knee M25.561 719.46   4. Right hip pain M25.551 719.45       There is no problem list on file for this patient.       Past Medical History:   Diagnosis Date   • Cardiac disease    • Sleep apnea         Past Surgical History:   Procedure Laterality Date   • CHOLECYSTECTOMY     • CORONARY STENT PLACEMENT      mid LAD   • FOOT SURGERY Left     bone growth removal   • HEMORRHOIDECTOMY     • JOINT REPLACEMENT Right     knee   • KNEE SURGERY Right     ACL repair                             PT Assessment/Plan     Row Name 10/15/18 0998          PT Assessment    Assessment Comments Handouts made for aqua exercises performed. Will review one more time before return to land environment. Mr. Anguiano reporting overall improvement in all symptoms.   -CK       User Key  (r) = Recorded By, (t) = Taken By, (c) = Cosigned By    Initials Name Provider Type    CK Daniel Saldana, PT Physical Therapist                    Exercises     Row Name 10/15/18 0900             Subjective Comments    Subjective Comments Had a good weekend. Nothing is talking to me. Everything is working fine.   -CK         Subjective Pain    Able to rate subjective pain? yes  -CK      Pre-Treatment Pain Level 0  -CK      Post-Treatment Pain Level 0  -CK         Aquatics    72688 - Aquatic Therapy Minutes 45  -CK         Aquatics LE    Water Walk forward;side;backward   x 3 laps ea with TA  -CK      Stretch 1 B calf stretch 2 x 20’  -CK      Stretch 2 B piriformis stretch 2 x 20”  -CK      Stretch 3 B HS stretch/sweep with LN x12’  -CK      Stretch Other 1 B quad stretch 2 x 20 sec LN  -CK      Stretch  Other 2 DKTC stretch on wall 5 x 10”  -CK      Abdominals noodle   LN x 15, SLS  -CK      Hip Abd/Add B x 15  -CK      March in Place performed walking x 2 laps  -CK      Toe/Heel Raises tip toes/tandem walking x 2 lap  -CK      Uni-Clock B leg press, LN x 15  -CK      Step Ups B hip circles cw/ccw 10/10  -CK        User Key  (r) = Recorded By, (t) = Taken By, (c) = Cosigned By    Initials Name Provider Type    CK Daniel Saldana, PT Physical Therapist                                            Time Calculation:   Start Time: 0900  Stop Time: 0945  Time Calculation (min): 45 min  Total Timed Code Minutes- PT: 45 minute(s)  Therapy Suggested Charges     Code   Minutes Charges    66527 (CPT®) Hc Pt Neuromusc Re Education Ea 15 Min      24098 (CPT®) Hc Pt Ther Proc Ea 15 Min      01085 (CPT®) Hc Gait Training Ea 15 Min      73991 (CPT®) Hc Pt Therapeutic Act Ea 15 Min      25438 (CPT®) Hc Pt Manual Therapy Ea 15 Min      35769 (CPT®) Hc Pt Ther Massage- Per 15 Min      20576 (CPT®) Hc Pt Iontophoresis Ea 15 Min      98874 (CPT®) Hc Pt Elec Stim Ea-Per 15 Min      15402 (CPT®) Hc Pt Ultrasound Ea 15 Min      04613 (CPT®) Hc Pt Self Care/Mgmt/Train Ea 15 Min      06136 (CPT®) Hc Pt Prosthetic (S) Train Initial Encounter, Each 15 Min      91082 (CPT®) Hc Orthotic(S) Mgmt/Train Initial Encounter, Each 15min      52290 (CPT®) Hc Pt Aquatic Therapy Ea 15 Min 45 3    35268 (CPT®) Hc Pt Orthotic(S)/Prosthetic(S) Encounter, Each 15 Min       (CPT®) Hc Pt Electrical Stim Unattended      Total  45 3        Therapy Charges for Today     Code Description Service Date Service Provider Modifiers Qty    61842529351 HC PT AQUATIC THERAPY EA 15 MIN 10/15/2018 Daniel Saldana, PT GP 3                    Daniel Saldana, PT  10/15/2018

## 2018-10-22 ENCOUNTER — HOSPITAL ENCOUNTER (OUTPATIENT)
Dept: PHYSICAL THERAPY | Facility: HOSPITAL | Age: 73
Setting detail: THERAPIES SERIES
Discharge: HOME | End: 2018-10-22

## 2018-10-22 DIAGNOSIS — R26.2 DIFFICULTY WALKING: ICD-10-CM

## 2018-10-22 DIAGNOSIS — M25.551 RIGHT HIP PAIN: ICD-10-CM

## 2018-10-22 DIAGNOSIS — M17.12 PRIMARY OSTEOARTHRITIS OF LEFT KNEE: Primary | ICD-10-CM

## 2018-10-22 DIAGNOSIS — M25.561 ACUTE PAIN OF RIGHT KNEE: ICD-10-CM

## 2018-10-22 PROCEDURE — 97113 AQUATIC THERAPY/EXERCISES: CPT | Performed by: PHYSICAL THERAPIST

## 2018-10-22 NOTE — THERAPY TREATMENT NOTE
Outpatient Physical Therapy Ortho Treatment Note  [facility]     Patient Name: Yunior Anguiano  : 1945  MRN: [id number]  Today's Date: 10/22/2018      Visit Date: 10/22/2018    Visit Dx:    ICD-10-CM ICD-9-CM   1. Primary osteoarthritis of left knee M17.12 715.16   2. Difficulty walking R26.2 719.7   3. Acute pain of right knee M25.561 719.46   4. Right hip pain M25.551 719.45       There is no problem list on file for this patient.       Past Medical History:   Diagnosis Date   • Cardiac disease    • Sleep apnea         Past Surgical History:   Procedure Laterality Date   • CHOLECYSTECTOMY     • CORONARY STENT PLACEMENT      mid LAD   • FOOT SURGERY Left     bone growth removal   • HEMORRHOIDECTOMY     • JOINT REPLACEMENT Right     knee   • KNEE SURGERY Right     ACL repair                             PT Assessment/Plan     Row Name 10/22/18 0954          PT Assessment    Assessment Comments Final aquatic therapy session today - patient performed ex using printout as guide.  PT provided clarification as necessary and answered patient questions.  He is doing well with therapy and verbalizes/demonstrates good understanding of aquatic program.  Isued prnted copy to patient so he can continue with aquatic ex on his own at [facility] and patient will return to land based PT next visit.  -RA        PT Plan    PT Plan Comments Return to land based PT at this time.   -RA       User Key  (r) = Recorded By, (t) = Taken By, (c) = Cosigned By    Initials Name Provider Type    Kayy Holcomb, PT Physical Therapist                    Exercises     Row Name 10/22/18 0900             Subjective Comments    Subjective Comments Doing well, today’s my last day in pool.  I go back upstairs to see Joon on Wednesday.  -RA         Subjective Pain    Able to rate subjective pain? yes  -RA      Pre-Treatment Pain Level 0  -RA      Post-Treatment Pain Level 0  -RA         Aquatics    27336 - Aquatic  Therapy Minutes 48  -RA         Aquatics LE    Water Walk forward;side;backward   x 3 laps ea  -RA      Stretch 1 B calf stretch 2 x 30’  -RA      Stretch 2 B piriformis stretch 2 x 30”  -RA      Stretch 3 B HS stretch/sweep with LN x12’  -RA      Stretch Other 1 B quad stretch 2 x 30 sec LN  -RA      Stretch Other 2 DKTC stretch on wall 5 x 10”  -RA      Abdominals noodle   LN x 10  -RA      Hip Abd/Add B x 15  -RA      Hip Flex/Ext 15/15  -RA      March in Place performed walking x 2 laps  -RA      Mini Squat 15  -RA      Toe/Heel Raises tip toes/tandem walking x 2 lap  -RA      Uni-Squat HS curls x 15 B  -RA      Uni-Clock B leg press, LN x 15  -RA      Step Ups B hip circles cw/ccw 15/15  -RA      Bicycle independent  -RA      Flutter/Scissor independent  -RA        User Key  (r) = Recorded By, (t) = Taken By, (c) = Cosigned By    Initials Name Provider Type    Kayy Holcomb, PT Physical Therapist                             Therapy Education  Given: HEP, Symptoms/condition management  Program: Reinforced  How Provided: Written, Verbal, Demonstration  Provided to: Patient  Level of Understanding: Teach back education performed, Verbalized, Demonstrated              Time Calculation:   Start Time: 0901  Stop Time: 0949  Time Calculation (min): 48 min  Therapy Suggested Charges     Code   Minutes Charges    13041 (CPT®) Hc Pt Neuromusc Re Education Ea 15 Min      93121 (CPT®) Hc Pt Ther Proc Ea 15 Min      99098 (CPT®) Hc Gait Training Ea 15 Min      89634 (CPT®) Hc Pt Therapeutic Act Ea 15 Min      60050 (CPT®) Hc Pt Manual Therapy Ea 15 Min      49131 (CPT®) Hc Pt Ther Massage- Per 15 Min      88883 (CPT®) Hc Pt Iontophoresis Ea 15 Min      07060 (CPT®) Hc Pt Elec Stim Ea-Per 15 Min      64058 (CPT®) Hc Pt Ultrasound Ea 15 Min      72793 (CPT®) Hc Pt Self Care/Mgmt/Train Ea 15 Min      15492 (CPT®) Hc Pt Prosthetic (S) Train Initial Encounter, Each 15 Min      94264 (CPT®) Hc Orthotic(S) Mgmt/Train Initial  Encounter, Each 15min      49302 (CPT®) Hc Pt Aquatic Therapy Ea 15 Min 48 3    93594 (CPT®) Hc Pt Orthotic(S)/Prosthetic(S) Encounter, Each 15 Min       (CPT®) Hc Pt Electrical Stim Unattended      Total  48 3        Therapy Charges for Today     Code Description Service Date Service Provider Modifiers Qty    56463027085 HC PT AQUATIC THERAPY EA 15 MIN 10/22/2018 Kayy Donaldson, PT GP 3                    Kayy Donaldson, PT  10/22/2018

## 2018-10-24 ENCOUNTER — HOSPITAL ENCOUNTER (OUTPATIENT)
Dept: PHYSICAL THERAPY | Facility: HOSPITAL | Age: 73
Setting detail: THERAPIES SERIES
Discharge: HOME | End: 2018-10-24

## 2018-10-24 DIAGNOSIS — M17.12 PRIMARY OSTEOARTHRITIS OF LEFT KNEE: Primary | ICD-10-CM

## 2018-10-24 DIAGNOSIS — M25.551 RIGHT HIP PAIN: ICD-10-CM

## 2018-10-24 DIAGNOSIS — R26.2 DIFFICULTY WALKING: ICD-10-CM

## 2018-10-24 DIAGNOSIS — M25.561 ACUTE PAIN OF RIGHT KNEE: ICD-10-CM

## 2018-10-24 PROCEDURE — 97110 THERAPEUTIC EXERCISES: CPT | Performed by: PHYSICAL THERAPIST

## 2018-10-24 NOTE — THERAPY TREATMENT NOTE
Outpatient Physical Therapy Ortho Treatment Note  [facility]     Patient Name: Yunior Anguiano  : 1945  MRN: [id number]  Today's Date: 10/24/2018      Visit Date: 10/24/2018    Visit Dx:    ICD-10-CM ICD-9-CM   1. Primary osteoarthritis of left knee M17.12 715.16   2. Difficulty walking R26.2 719.7   3. Acute pain of right knee M25.561 719.46   4. Right hip pain M25.551 719.45       There is no problem list on file for this patient.       Past Medical History:   Diagnosis Date   • Cardiac disease    • Sleep apnea         Past Surgical History:   Procedure Laterality Date   • CHOLECYSTECTOMY     • CORONARY STENT PLACEMENT      mid LAD   • FOOT SURGERY Left     bone growth removal   • HEMORRHOIDECTOMY     • JOINT REPLACEMENT Right     knee   • KNEE SURGERY Right     ACL repair             PT Assessment/Plan     Row Name 10/24/18 1201          PT Assessment    Assessment Comments Yunior tolerated treatment well.  His R LE is back to normal and the L knee is responding well to treatment.  -MP        PT Plan    PT Plan Comments Progress exercises with hip clams, hooklying, with theraband.  -MP       User Key  (r) = Recorded By, (t) = Taken By, (c) = Cosigned By    Initials Name Provider Type    Joon Aguilar, PT Physical Therapist                Exercises     Row Name 10/24/18 1100             Subjective Comments    Subjective Comments Yunior reported that the R LE is back to normal.  The only time the L knee is an issue is when he sits for too, long.  Walking for a brief distance will loosen it back up.  -MP         Subjective Pain    Able to rate subjective pain? yes  -MP      Pre-Treatment Pain Level 0  -MP      Post-Treatment Pain Level 0  -MP         Aquatics    29090 - Aquatic Therapy Minutes --  -MP         Total Minutes    88973 - PT Therapeutic Exercise Minutes 45  -MP         Exercise 1    Exercise Name 1 Refer to land flow sheet  -MP      Additional Comments  Progerssed therapeutic exercises with step ups 10 x 2 B and seated knee flexion with scoot x 10.    -MP        User Key  (r) = Recorded By, (t) = Taken By, (c) = Cosigned By    Initials Name Provider Type    Joon Aguilar PT Physical Therapist                PT OP Goals     Row Name 10/24/18 1200          PT Short Term Goals    STG Date to Achieve 10/26/18  -MP     STG 1 uYnior is progressed with land based therapeutic exercises such as low intensity step ups, seated long arc quads lying short arc quads and lying hip clams.  -MP     STG 1 Progress Ongoing  -MP     STG 2 Yunior is introduced to aqua theapy exercises and is indiependent in their application.  -MP     STG 2 Progress Ongoing  -MP     STG 3 KOS disability improves to 24%  -MP     STG 3 Progress Ongoing  -MP        Long Term Goals    LTG Date to Achieve 12/25/18  -MP     LTG 1 AROM of the each knee improves by 5 degrees.  -MP     LTG 1 Progress Ongoing  -MP     LTG 2 KOS disability is below 19%.  -MP     LTG 2 Progress Ongoing  -MP     LTG 3 Mick is independent with a HEP and all self care education.  -MP     LTG 3 Progress Ongoing  -MP       User Key  (r) = Recorded By, (t) = Taken By, (c) = Cosigned By    Initials Name Provider Type    Joon Aguilar PT Physical Therapist          Therapy Education  Education Details: Added seated knee flexion with scoot and step ups to his HEP.  Given: HEP, Symptoms/condition management  Program: New  How Provided: Written  Provided to: Patient  Level of Understanding: Teach back education performed     Time Calculation:   Start Time: 1115  Stop Time: 1200  Time Calculation (min): 45 min  Therapy Suggested Charges     Code   Minutes Charges    46471 (CPT®) Hc Pt Neuromusc Re Education Ea 15 Min      33731 (CPT®) Hc Pt Ther Proc Ea 15 Min 45 3    00309 (CPT®) Hc Gait Training Ea 15 Min      54853 (CPT®) Hc Pt Therapeutic Act Ea 15 Min      98502 (CPT®) Hc Pt Manual Therapy Ea 15 Min      94524 (CPT®) Hc Pt  Ther Massage- Per 15 Min      35810 (CPT®) Hc Pt Iontophoresis Ea 15 Min      50938 (CPT®) Hc Pt Elec Stim Ea-Per 15 Min      87408 (CPT®) Hc Pt Ultrasound Ea 15 Min      68320 (CPT®) Hc Pt Self Care/Mgmt/Train Ea 15 Min      47749 (CPT®) Hc Pt Prosthetic (S) Train Initial Encounter, Each 15 Min      80698 (CPT®) Hc Orthotic(S) Mgmt/Train Initial Encounter, Each 15min      40562 (CPT®) Hc Pt Aquatic Therapy Ea 15 Min      40067 (CPT®) Hc Pt Orthotic(S)/Prosthetic(S) Encounter, Each 15 Min       (CPT®) Hc Pt Electrical Stim Unattended      Total  45 3        Therapy Charges for Today     Code Description Service Date Service Provider Modifiers Qty    36856056841 HC PT THER PROC EA 15 MIN 10/24/2018 Joon Kraft, PT GP 3    30779765245 HC PT THER PROC EA 15 MIN 10/24/2018 Joon Kraft, PT GP 3          Joon Kraft PT  10/24/2018

## 2018-10-29 ENCOUNTER — HOSPITAL ENCOUNTER (OUTPATIENT)
Dept: PHYSICAL THERAPY | Facility: HOSPITAL | Age: 73
Setting detail: THERAPIES SERIES
Discharge: HOME | End: 2018-10-29

## 2018-10-29 DIAGNOSIS — M25.551 RIGHT HIP PAIN: ICD-10-CM

## 2018-10-29 DIAGNOSIS — M25.561 ACUTE PAIN OF RIGHT KNEE: ICD-10-CM

## 2018-10-29 DIAGNOSIS — R26.2 DIFFICULTY WALKING: ICD-10-CM

## 2018-10-29 DIAGNOSIS — M17.12 PRIMARY OSTEOARTHRITIS OF LEFT KNEE: Primary | ICD-10-CM

## 2018-10-29 PROCEDURE — G8980 MOBILITY D/C STATUS: HCPCS | Performed by: PHYSICAL THERAPIST

## 2018-10-29 PROCEDURE — 97110 THERAPEUTIC EXERCISES: CPT | Performed by: PHYSICAL THERAPIST

## 2018-10-29 PROCEDURE — G8979 MOBILITY GOAL STATUS: HCPCS | Performed by: PHYSICAL THERAPIST

## 2018-10-29 NOTE — THERAPY DISCHARGE NOTE
Outpatient Physical Therapy Ortho Treatment Note/Discharge Summary  [facility]     Patient Name: Yunior Anguiano  : 1945  MRN: [id number]  Today's Date: 10/29/2018      Visit Date: 10/29/2018    Visit Dx:    ICD-10-CM ICD-9-CM   1. Primary osteoarthritis of left knee M17.12 715.16   2. Difficulty walking R26.2 719.7   3. Acute pain of right knee M25.561 719.46   4. Right hip pain M25.551 719.45       There is no problem list on file for this patient.       Past Medical History:   Diagnosis Date   • Cardiac disease    • Sleep apnea         Past Surgical History:   Procedure Laterality Date   • CHOLECYSTECTOMY     • CORONARY STENT PLACEMENT      mid LAD   • FOOT SURGERY Left     bone growth removal   • HEMORRHOIDECTOMY     • JOINT REPLACEMENT Right     knee   • KNEE SURGERY Right     ACL repair             PT Ortho     Row Name 10/29/18 0900       General ROM    GENERAL ROM COMMENTS L knee, supine -6-0-126 degrees of flexion  -MP       MMT (Manual Muscle Testing)    General MMT Comments L knee flexion/extension 4+/5  -MP      User Key  (r) = Recorded By, (t) = Taken By, (c) = Cosigned By    Initials Name Provider Type    Joon Aguilar PT Physical Therapist                Exercises     Row Name 10/29/18 0900             Subjective Comments    Subjective Comments Yunior reports that he is ready for discharge after today's visit.  -MP         Subjective Pain    Able to rate subjective pain? yes  -MP      Pre-Treatment Pain Level 0  -MP      Post-Treatment Pain Level 0  -MP         Total Minutes    21707 - PT Therapeutic Exercise Minutes 45  -MP         Exercise 1    Exercise Name 1 Refer to land flow sheet  -MP      Additional Comments Progressed therapeutic exercises with TKE and hip clams, hooklying, both used blue theraband for 10-20 repetitions.    -MP        User Key  (r) = Recorded By, (t) = Taken By, (c) = Cosigned By    Initials Name Provider Type    Joon Aguilar  PT Physical Therapist                  PT OP Goals     Row Name 10/29/18 1000 10/29/18 0900       PT Short Term Goals    STG Date to Achieve 10/26/18  -MP 10/26/18  -MP    STG 1 Yunior is progressed with land based therapeutic exercises such as low intensity step ups, seated long arc quads lying short arc quads and lying hip clams.  -MP Yunior is progressed with land based therapeutic exercises such as low intensity step ups, seated long arc quads lying short arc quads and lying hip clams.  -MP    STG 1 Progress Met  -MP Met  -MP    STG 2 Yunior is introduced to aqua theapy exercises and is indiependent in their application.  -MP Yunior is introduced to aqua theapy exercises and is indiependent in their application.  -MP    STG 2 Progress Met  -MP Met  -MP    STG 3 KOS disability improves to 24%  -MP KOS disability improves to 24%  -MP    STG 3 Progress Met  -MP Met  -MP       Long Term Goals    LTG Date to Achieve 12/25/18  -MP 12/25/18  -MP    LTG 1 AROM of the each knee improves by 5 degrees.  -MP AROM of the each knee improves by 5 degrees.  -MP    LTG 1 Progress Met  -MP Met  -MP    LTG 2 KOS disability is below 19%.  -MP KOS disability is below 19%.  -MP    LTG 2 Progress Met  -MP Met  -MP    LTG 3 Mick is independent with a HEP and all self care education.  -MP Mick is independent with a HEP and all self care education.  -MP    LTG 3 Progress Met  -MP Met  -MP      User Key  (r) = Recorded By, (t) = Taken By, (c) = Cosigned By    Initials Name Provider Type    Joon Aguilar, PT Physical Therapist          Therapy Education  Education Details: Blue theraband issued for him to perform TKE and hip clams at home.  Given: HEP, Symptoms/condition management  Program: New  How Provided: Written  Provided to: Patient  Level of Understanding: Teach back education performed    Outcome Measure Options: Knee Outcome Score- ADL  Knee Outcome Score  Knee Outcome Score Comments: 70/80, 12% disability      Time  Calculation:   Start Time: 0900  Stop Time: 0945  Time Calculation (min): 45 min  Therapy Suggested Charges     Code   Minutes Charges    48901 (CPT®) Hc Pt Neuromusc Re Education Ea 15 Min      72946 (CPT®) Hc Pt Ther Proc Ea 15 Min 45 3    41616 (CPT®) Hc Gait Training Ea 15 Min      65984 (CPT®) Hc Pt Therapeutic Act Ea 15 Min      97454 (CPT®) Hc Pt Manual Therapy Ea 15 Min      13695 (CPT®) Hc Pt Ther Massage- Per 15 Min      43552 (CPT®) Hc Pt Iontophoresis Ea 15 Min      43868 (CPT®) Hc Pt Elec Stim Ea-Per 15 Min      64197 (CPT®) Hc Pt Ultrasound Ea 15 Min      66762 (CPT®) Hc Pt Self Care/Mgmt/Train Ea 15 Min      00455 (CPT®) Hc Pt Prosthetic (S) Train Initial Encounter, Each 15 Min      38757 (CPT®) Hc Orthotic(S) Mgmt/Train Initial Encounter, Each 15min      92854 (CPT®) Hc Pt Aquatic Therapy Ea 15 Min      98395 (CPT®) Hc Pt Orthotic(S)/Prosthetic(S) Encounter, Each 15 Min       (CPT®) Hc Pt Electrical Stim Unattended      Total  45 3        Therapy Charges for Today     Code Description Service Date Service Provider Modifiers Qty    85762260254 HC PT MOBILITY PROJECTED 10/29/2018 Joon Kraft, PT GP, CI 1    47004156887 HC PT MOBILITY DISCHARGE 10/29/2018 Joon Kraft, PT GP, CI 1    01887484899 HC PT THER PROC EA 15 MIN 10/29/2018 Joon Kraft, PT GP 3          PT G-Codes  Outcome Measure Options: Knee Outcome Score- ADL  Functional Limitation: Mobility: Walking and moving around  Mobility: Walking and Moving Around Goal Status (): At least 1 percent but less than 20 percent impaired, limited or restricted  Mobility: Walking and Moving Around Discharge Status (): At least 1 percent but less than 20 percent impaired, limited or restricted     Joon Kraft PT  10/29/2018

## 2018-10-31 ENCOUNTER — APPOINTMENT (OUTPATIENT)
Dept: PHYSICAL THERAPY | Facility: HOSPITAL | Age: 73
End: 2018-10-31

## 2019-09-12 ENCOUNTER — OFFICE VISIT (OUTPATIENT)
Dept: ORTHOPEDIC SURGERY | Facility: CLINIC | Age: 74
End: 2019-09-12

## 2019-09-12 VITALS — WEIGHT: 195 LBS | TEMPERATURE: 98 F | BODY MASS INDEX: 28.88 KG/M2 | HEIGHT: 69 IN

## 2019-09-12 DIAGNOSIS — M25.562 CHRONIC PAIN OF LEFT KNEE: ICD-10-CM

## 2019-09-12 DIAGNOSIS — Z96.651 HISTORY OF TOTAL KNEE ARTHROPLASTY, RIGHT: Primary | ICD-10-CM

## 2019-09-12 DIAGNOSIS — G89.29 CHRONIC PAIN OF LEFT KNEE: ICD-10-CM

## 2019-09-12 PROCEDURE — 73562 X-RAY EXAM OF KNEE 3: CPT | Performed by: ORTHOPAEDIC SURGERY

## 2019-09-12 PROCEDURE — 99213 OFFICE O/P EST LOW 20 MIN: CPT | Performed by: ORTHOPAEDIC SURGERY

## 2019-09-12 NOTE — PROGRESS NOTES
Patient: Yunior Anguiano  YOB: 1945 73 y.o. male  Medical Record Number: [id number]    Chief Complaints:   Chief Complaint   Patient presents with   • Right Knee - Follow-up   • Left Knee - Follow-up       History of Present Illness:Yunior Anguiano is a 73 y.o. male who presents for follow-up of left knee pain moderate to severe ache about the medial joint worse with weightbearing activity it has progressed over the years.  He is not sure yet if he wants to proceed with knee replacement previous injections and anti-inflammatories have not helped control his pain.  Pain does limit his activities of daily living.    Allergies:   Allergies   Allergen Reactions   • Amlodipine Besylate Anaphylaxis and Hives   • Cefdinir      rash   • Erythromycin Base      vomits   • Losartan      Itching and it stopped with he stopped the losartan   • Statins      ACHES cannot take   Muscles get WEAK       Medications:   Current Outpatient Medications   Medication Sig Dispense Refill   • aspirin 81 MG chewable tablet Chew 81 mg.     • BYSTOLIC 2.5 MG tablet TAKE 1 TABLET BY MOUTH ONCE DAILY  10   • cholecalciferol (VITAMIN D3) 1000 units tablet Take 1,000 Units by mouth Daily.     • gabapentin (NEURONTIN) 100 MG capsule Take 100 mg by mouth.     • glucosamine-chondroitin 500-400 MG capsule capsule Take  by mouth 3 (Three) Times a Day With Meals.     • HYDROcodone-acetaminophen (NORCO) 5-325 MG per tablet TAKE 1 TABLET BY MOUTH EVERY 6 HRS FOR 7 DAYS AS NEEDED FOR PAIN     • meloxicam (MOBIC) 15 MG tablet 1 PO Daily with food. 30 tablet 3   • ZETIA 10 MG tablet TAKE 1/2 TAB (5MG) BY MOUTH DAILY  5     No current facility-administered medications for this visit.          The following portions of the patient's history were reviewed and updated as appropriate: allergies, current medications, past family history, past medical history, past social history, past surgical history and problem list.    Review of Systems:  "  A 14 point review of systems was performed. All systems negative except pertinent positives/negative listed in HPI above    Physical Exam:   Vitals:    09/12/19 0951   Temp: 98 °F (36.7 °C)   Weight: 88.5 kg (195 lb)   Height: 175.3 cm (69\")       General: A and O x 3, ASA, NAD    SCLERA:    Normal    DENTITION:   Normal  Knee:  left    ALIGNMENT:     Varus  ,   Patella  tracks  midline    GAIT:    Antalgic    SKIN:    No abnormality    RANGE OF MOTION:   3  -  120   DEG    STRENGTH:   4  / 5    LIGAMENTS:    No varus / valgus instability.   Negative  Lachman.    MENISCUS:     Negative   Martha       DISTAL PULSES:    Paplable    DISTAL SENSATION :   Intact    LYMPHATICS:     No   lymphadenopathy    OTHER:          - Positive   effusion      - Crepitance with ROM       Radiology:  Xrays 3views bilateral knees  (ap,lateral, sunrise) were ordered and reviewed for evaluation of knee pain demonstratingadvanced varus osteoarthritis with bone on bone articulation, subchondral cysts, and periarticular osteophytes and a well positioned knee replacement without evidence of wear, loosening or osteolysis  todays xrays were compared to previous xrays and demonstrate no change    Assessment/Plan:  Severe advanced end-stage left knee osteoarthritis at the point of needing knee replacement he is going to consider if he like to proceed he will call back and we will get him scheduled.  If not I would see him no later than a year from now with repeat x-rays.  He should continue quad strengthening exercises.  "

## 2019-12-05 ENCOUNTER — TELEPHONE (OUTPATIENT)
Dept: ORTHOPEDIC SURGERY | Facility: CLINIC | Age: 74
End: 2019-12-05

## 2019-12-08 DIAGNOSIS — M17.10 PRIMARY OSTEOARTHRITIS OF KNEE, UNSPECIFIED LATERALITY: Primary | ICD-10-CM

## 2019-12-08 RX ORDER — PREGABALIN 75 MG/1
150 CAPSULE ORAL ONCE
Status: CANCELLED | OUTPATIENT
Start: 2020-03-06 | End: 2019-12-08

## 2019-12-08 RX ORDER — MELOXICAM 15 MG/1
15 TABLET ORAL ONCE
Status: CANCELLED | OUTPATIENT
Start: 2020-03-06 | End: 2019-12-08

## 2019-12-08 RX ORDER — CLINDAMYCIN PHOSPHATE 900 MG/50ML
900 INJECTION INTRAVENOUS ONCE
Status: CANCELLED | OUTPATIENT
Start: 2020-03-06 | End: 2019-12-08

## 2019-12-11 PROBLEM — M17.10 PRIMARY OSTEOARTHRITIS OF KNEE: Status: ACTIVE | Noted: 2019-12-11

## 2020-02-20 ENCOUNTER — APPOINTMENT (OUTPATIENT)
Dept: PREADMISSION TESTING | Facility: HOSPITAL | Age: 75
End: 2020-02-20

## 2020-02-20 VITALS
BODY MASS INDEX: 28 KG/M2 | WEIGHT: 189.06 LBS | OXYGEN SATURATION: 99 % | HEIGHT: 69 IN | SYSTOLIC BLOOD PRESSURE: 159 MMHG | DIASTOLIC BLOOD PRESSURE: 72 MMHG | HEART RATE: 60 BPM | RESPIRATION RATE: 16 BRPM | TEMPERATURE: 98.3 F

## 2020-02-20 DIAGNOSIS — M17.10 PRIMARY OSTEOARTHRITIS OF KNEE, UNSPECIFIED LATERALITY: ICD-10-CM

## 2020-02-20 LAB
ANION GAP SERPL CALCULATED.3IONS-SCNC: 11.9 MMOL/L (ref 5–15)
BILIRUB UR QL STRIP: NEGATIVE
BUN BLD-MCNC: 19 MG/DL (ref 8–23)
BUN/CREAT SERPL: 24.1 (ref 7–25)
CALCIUM SPEC-SCNC: 9.1 MG/DL (ref 8.6–10.5)
CHLORIDE SERPL-SCNC: 101 MMOL/L (ref 98–107)
CLARITY UR: CLEAR
CO2 SERPL-SCNC: 28.1 MMOL/L (ref 22–29)
COLOR UR: YELLOW
CREAT BLD-MCNC: 0.79 MG/DL (ref 0.76–1.27)
DEPRECATED RDW RBC AUTO: 39.7 FL (ref 37–54)
ERYTHROCYTE [DISTWIDTH] IN BLOOD BY AUTOMATED COUNT: 12.1 % (ref 12.3–15.4)
GFR SERPL CREATININE-BSD FRML MDRD: 96 ML/MIN/1.73
GLUCOSE BLD-MCNC: 107 MG/DL (ref 65–99)
GLUCOSE UR STRIP-MCNC: NEGATIVE MG/DL
HCT VFR BLD AUTO: 39.8 % (ref 37.5–51)
HGB BLD-MCNC: 13.4 G/DL (ref 13–17.7)
HGB UR QL STRIP.AUTO: NEGATIVE
KETONES UR QL STRIP: ABNORMAL
LEUKOCYTE ESTERASE UR QL STRIP.AUTO: NEGATIVE
MCH RBC QN AUTO: 30.4 PG (ref 26.6–33)
MCHC RBC AUTO-ENTMCNC: 33.7 G/DL (ref 31.5–35.7)
MCV RBC AUTO: 90.2 FL (ref 79–97)
NITRITE UR QL STRIP: NEGATIVE
PH UR STRIP.AUTO: 5.5 [PH] (ref 5–8)
PLATELET # BLD AUTO: 147 10*3/MM3 (ref 140–450)
PMV BLD AUTO: 9.8 FL (ref 6–12)
POTASSIUM BLD-SCNC: 3.7 MMOL/L (ref 3.5–5.2)
PROT UR QL STRIP: NEGATIVE
RBC # BLD AUTO: 4.41 10*6/MM3 (ref 4.14–5.8)
SODIUM BLD-SCNC: 141 MMOL/L (ref 136–145)
SP GR UR STRIP: 1.02 (ref 1–1.03)
UROBILINOGEN UR QL STRIP: ABNORMAL
WBC NRBC COR # BLD: 5.98 10*3/MM3 (ref 3.4–10.8)

## 2020-02-20 PROCEDURE — 93005 ELECTROCARDIOGRAM TRACING: CPT

## 2020-02-20 PROCEDURE — 93010 ELECTROCARDIOGRAM REPORT: CPT | Performed by: INTERNAL MEDICINE

## 2020-02-20 PROCEDURE — 81003 URINALYSIS AUTO W/O SCOPE: CPT | Performed by: ORTHOPAEDIC SURGERY

## 2020-02-20 PROCEDURE — 85027 COMPLETE CBC AUTOMATED: CPT | Performed by: ORTHOPAEDIC SURGERY

## 2020-02-20 PROCEDURE — 80048 BASIC METABOLIC PNL TOTAL CA: CPT | Performed by: ORTHOPAEDIC SURGERY

## 2020-02-20 PROCEDURE — 36415 COLL VENOUS BLD VENIPUNCTURE: CPT

## 2020-02-20 RX ORDER — ACETAMINOPHEN 160 MG
2000 TABLET,DISINTEGRATING ORAL DAILY
COMMUNITY
End: 2020-03-07 | Stop reason: HOSPADM

## 2020-02-20 RX ORDER — FLUTICASONE PROPIONATE 50 MCG
2 SPRAY, SUSPENSION (ML) NASAL AS NEEDED
COMMUNITY
End: 2022-02-21

## 2020-02-20 ASSESSMENT — KOOS JR
KOOS JR SCORE: 73.342
KOOS JR SCORE: 5

## 2020-02-20 NOTE — DISCHARGE INSTRUCTIONS
Take the following medications the morning of surgery:    BYSTOLIC        ARRIVAL TO MAIN OR DESK 3-16-20:  DR. DISLA OFFICE WILL CALL YOU WITH ARRIVAL TIME        General Instructions:  • Do not eat solid food after midnight the night before surgery.  • You may drink clear liquids day of surgery but must stop at least one hour before your hospital arrival time.  • It is beneficial for you to have a clear drink that contains carbohydrates the day of surgery.  We suggest a 12 to 20 ounce bottle of Gatorade or Powerade for non-diabetic patients or a 12 to 20 ounce bottle of G2 or Powerade Zero for diabetic patients. (Pediatric patients, are not advised to drink a 12 to 20 ounce carbohydrate drink)    Clear liquids are liquids you can see through.  Nothing red in color.     Plain water                               Sports drinks  Sodas                                   Gelatin (Jell-O)  Fruit juices without pulp such as white grape juice and apple juice  Popsicles that contain no fruit or yogurt  Tea or coffee (no cream or milk added)  Gatorade / Powerade  G2 / Powerade Zero    • Infants may have breast milk up to four hours before surgery.  • Infants drinking formula may drink formula up to six hours before surgery.   • Patients who avoid smoking, chewing tobacco and alcohol for 4 weeks prior to surgery have a reduced risk of post-operative complications.  Quit smoking as many days before surgery as you can.  • Do not smoke, use chewing tobacco or drink alcohol the day of surgery.   • If applicable bring your C-PAP/ BI-PAP machine.  • Bring any papers given to you in the doctor’s office.  • Wear clean comfortable clothes.  • Do not wear contact lenses, false eyelashes or make-up.  Bring a case for your glasses.   • Bring crutches or walker if applicable.  • Remove all piercings.  Leave jewelry and any other valuables at home.  • Hair extensions with metal clips must be removed prior to surgery.  • The Pre-Admission  Testing nurse will instruct you to bring medications if unable to obtain an accurate list in Pre-Admission Testing.            Preventing a Surgical Site Infection:  • For 2 to 3 days before surgery, avoid shaving with a razor because the razor can irritate skin and make it easier to develop an infection.    • Any areas of open skin can increase the risk of a post-operative wound infection by allowing bacteria to enter and travel throughout the body.  Notify your surgeon if you have any skin wounds / rashes even if it is not near the expected surgical site.  The area will need assessed to determine if surgery should be delayed until it is healed.  • The night prior to surgery sleep in a clean bed with clean clothing.  Do not allow pets to sleep with you.  • Shower on the morning of surgery using a fresh bar of anti-bacterial soap (such as Dial) and clean washcloth.  Dry with a clean towel and dress in clean clothing.  • Ask your surgeon if you will be receiving antibiotics prior to surgery.  • Make sure you, your family, and all healthcare providers clean their hands with soap and water or an alcohol based hand  before caring for you or your wound.    Day of surgery:  Your arrival time is approximately two hours before your scheduled surgery time.  Upon arrival, a Pre-op nurse and Anesthesiologist will review your health history, obtain vital signs, and answer questions you may have.  The only belongings needed at this time will be a list of your home medications and if applicable your C-PAP/BI-PAP machine.  If you are staying overnight your family can leave the rest of your belongings in the car and bring them to your room later.  A Pre-op nurse will start an IV and you may receive medication in preparation for surgery, including something to help you relax.  Your family will be able to see you in the Pre-op area.  Two visitors at a time will be allowed in the Pre-op room.  While you are in surgery your  family should notify the waiting room  if they leave the waiting room area and provide a contact phone number.    Please be aware that surgery does come with discomfort.  We want to make every effort to control your discomfort so please discuss any uncontrolled symptoms with your nurse.   Your doctor will most likely have prescribed pain medications.      If you are going home after surgery you will receive individualized written care instructions before being discharged.  A responsible adult must drive you to and from the hospital on the day of your surgery and stay with you for 24 hours.    If you are staying overnight following surgery, you will be transported to your hospital room following the recovery period.  [facility] has all private rooms.    If you have any questions please call Pre-Admission Testing at [phone].  Deductibles and co-payments are collected on the day of service. Please be prepared to pay the required co-pay, deductible or deposit on the day of service as defined by your plan.      2% CHLORHEXIDINE GLUCONATE* CLOTH  Preparing or “prepping” skin before surgery can reduce the risk of infection at the surgical site. To make the process easier, [facility] has chosen disposable cloths moistened with a rinse-free, 2% Chlorhexidine Gluconate (CHG) antiseptic solution. The steps below outline the prepping process and should be carefully followed.        Use the prep cloth on the area that is circled in the diagram             Directions Night before Surgery  1) Shower using a fresh bar of anti-bacterial soap (such as Dial) and clean washcloth.  Use a clean towel to completely dry your skin.  2) Do not use any lotions, oils or creams on your skin.  3) Open the package and remove 1 cloth, wipe your skin for 30 seconds in a circular motion.  Allow to dry for 3 minutes.  4) Repeat #3 with second cloth.  5) Do not touch your eyes, ears, or mouth with the  prep cloth.  6) Allow the wet prep solution to air dry.  7) Discard the prep cloth and wash your hands with soap and water.   8) Dress in clean bed clothes and sleep on fresh clean bed sheets.   9) You may experience some temporary itching after the prep.    Directions Day of Surgery  1) Repeat steps 1,2,3,4,5,6,7, and 9.   2) Dress in clean clothes before coming to the hospital.    BACTROBAN NASAL OINTMENT  There are many germs normally in your nose. Bactroban is an ointment that will help reduce these germs. Please follow these instructions for Bactroban use:      ____The day before surgery in the morning  Date________    ____The day before surgery in the evening              Date________    ____The day of surgery in the morning    Date________    **Squirt ½ package of Bactroban Ointment onto a cotton applicator and apply to inside of 1st nostril.  Squirt the remaining Bactroban and apply to the inside of the other nostril.

## 2020-02-27 ENCOUNTER — OFFICE VISIT (OUTPATIENT)
Dept: ORTHOPEDIC SURGERY | Facility: CLINIC | Age: 75
End: 2020-02-27

## 2020-02-27 VITALS
DIASTOLIC BLOOD PRESSURE: 71 MMHG | BODY MASS INDEX: 28.14 KG/M2 | SYSTOLIC BLOOD PRESSURE: 130 MMHG | TEMPERATURE: 97.9 F | WEIGHT: 190 LBS | HEIGHT: 69 IN

## 2020-02-27 DIAGNOSIS — M17.12 PRIMARY OSTEOARTHRITIS OF LEFT KNEE: Primary | ICD-10-CM

## 2020-02-27 PROCEDURE — 77077 JOINT SURVEY SINGLE VIEW: CPT | Performed by: ORTHOPAEDIC SURGERY

## 2020-02-27 PROCEDURE — S0260 H&P FOR SURGERY: HCPCS | Performed by: NURSE PRACTITIONER

## 2020-02-27 NOTE — H&P
Patient: Yunior Anguiano    Date of Admission: 3/6/20    YOB: 1945    Medical Record Number: [id number]    Admitting Physician: Dr. Obed Ramos    Reason for Admission: End Stage Left Knee OA    History of Present Illness: 74 y.o. male presents with severe end stage knee osteoarthritis which has not been responsive to the full compliment of conservative measures. Despite conservative attempts, there is still severe, constant activity limiting pain. Given the severity of the pain, the patient has elected to proceed with knee replacement.    Allergies:   Allergies   Allergen Reactions   • Amlodipine Besylate Anaphylaxis and Hives   • Cefdinir Hives     rash   • Losartan Itching     Itching and it stopped with he stopped the losartan   • Statins Other (See Comments)     ACHES cannot take   Muscles get WEAK   • Turmeric Hives   • Erythromycin Base Nausea And Vomiting     vomits         Current Medications:  Home Medications:    Current Outpatient Medications on File Prior to Visit   Medication Sig   • aspirin 81 MG chewable tablet Chew 81 mg Daily. PT TO STOP ONE WEEK PRIOR TO SURGERY   • BYSTOLIC 2.5 MG tablet Take 2.5 mg by mouth Daily.   • Chlorhexidine Gluconate 2 % pads Apply  topically. AS DIRECTED IN PAT   • Cholecalciferol (VITAMIN D3) 50 MCG (2000 UT) capsule Take 2,000 Units by mouth Daily.   • fluticasone (FLONASE) 50 MCG/ACT nasal spray 2 sprays into the nostril(s) as directed by provider As Needed for Rhinitis.   • gabapentin (NEURONTIN) 100 MG capsule Take 100 mg by mouth As Needed (FOR RESTLESS LEGS).   • mupirocin (BACTROBAN) 2 % ointment Apply  topically to the appropriate area as directed 3 (Three) Times a Day. AS DIRECTED IN PAT   • ZETIA 10 MG tablet Take 10 mg by mouth Every Night.     No current facility-administered medications on file prior to visit.      PRN Meds:.    PMH:     Past Medical History:   Diagnosis Date   • Arthritis     LEFT KNEE-GETS STIFF AFTER SITTING AND  "UNSTABLE GETTING UP FROM SITTING POSITION   • Cardiac disease    • Cardiac murmur    • History of chest pain    • History of fractured pelvis    • History of pancreatitis    • Hypertension    • Sleep apnea        PF/Surg/Soc Hx:     Past Surgical History:   Procedure Laterality Date   • CARPAL TUNNEL RELEASE Right 2018   • CATARACT EXTRACTION, BILATERAL     • CHOLECYSTECTOMY  2014   • CORONARY STENT PLACEMENT  2015    mid LAD   • FOOT SURGERY Left 1992    bone growth removal   • HEMORRHOIDECTOMY     • JOINT REPLACEMENT Right 2003    knee   • KNEE SURGERY Right 1968    ACL repair   • RETINAL DETACHMENT REPAIR Right         Social History     Occupational History   • Not on file   Tobacco Use   • Smoking status: Never Smoker   • Smokeless tobacco: Never Used   Substance and Sexual Activity   • Alcohol use: Yes     Comment: 2-3 TIMES PER WEEK   • Drug use: No   • Sexual activity: Defer      Social History     Social History Narrative   • Not on file        Family History   Problem Relation Age of Onset   • Hypertension Mother    • Malig Hyperthermia Neg Hx          Review of Systems:   A 14 point review of systems was performed, pertinent positives discussed above, all other systems are negative    Physical Exam: 74 y.o. male  Vital Signs :   Vitals:    02/27/20 1253   BP: 130/71   Temp: 97.9 °F (36.6 °C)   TempSrc: Temporal   Weight: 86.2 kg (190 lb)   Height: 175.3 cm (69\")   PainSc: 0-No pain     General: Alert and Oriented x 3, No acute distress.  Psych: mood and affect appropriate; recent and remote memory intact  Eyes: conjunctiva clear; pupils equally round and reactive, sclera nonicteric  CV: no peripheral edema  Resp: normal respiratory effort  Skin: no rashes or wounds; normal turgor  Musculosketetal; pain and crepitance with knee range of motion  Vascular: palpable distal pulses    Xrays:  -3 views (AP, lateral, and sunrise) were reviewed demonstrating end-stage OA with bone on bone articulation.  -A full " length AP xray was ordered and reviewed today for purposes of operative alignment demonstrating end stage arthritic findings. There are no previous full length films for review    Assessment:  End-stage Left knee osteoarthritis. Conservative measures have failed.      Plan:  The plan is to proceed with Left Total Knee Replacement. The patient voiced understanding of the risks, benefits, and alternative forms of treatment that were discussed with Dr Ramos at the time of scheduling.  Patient's plan on going home with home health next day    Naty Ford, APRN  2/27/2020

## 2020-03-06 ENCOUNTER — ANESTHESIA (OUTPATIENT)
Dept: PERIOP | Facility: HOSPITAL | Age: 75
End: 2020-03-06

## 2020-03-06 ENCOUNTER — HOSPITAL ENCOUNTER (OUTPATIENT)
Facility: HOSPITAL | Age: 75
Discharge: HOME HEALTH | End: 2020-03-07
Attending: ORTHOPAEDIC SURGERY | Admitting: ORTHOPAEDIC SURGERY

## 2020-03-06 ENCOUNTER — APPOINTMENT (OUTPATIENT)
Dept: GENERAL RADIOLOGY | Facility: HOSPITAL | Age: 75
End: 2020-03-06

## 2020-03-06 ENCOUNTER — ANESTHESIA EVENT (OUTPATIENT)
Dept: PERIOP | Facility: HOSPITAL | Age: 75
End: 2020-03-06

## 2020-03-06 DIAGNOSIS — M17.12 PRIMARY OSTEOARTHRITIS OF LEFT KNEE: Primary | ICD-10-CM

## 2020-03-06 DIAGNOSIS — M17.10 PRIMARY OSTEOARTHRITIS OF KNEE, UNSPECIFIED LATERALITY: ICD-10-CM

## 2020-03-06 PROBLEM — M17.9 OA (OSTEOARTHRITIS) OF KNEE: Status: ACTIVE | Noted: 2020-03-06

## 2020-03-06 LAB
CREAT BLD-MCNC: 0.75 MG/DL (ref 0.76–1.27)
GFR SERPL CREATININE-BSD FRML MDRD: 102 ML/MIN/1.73

## 2020-03-06 PROCEDURE — C9290 INJ, BUPIVACAINE LIPOSOME: HCPCS | Performed by: ORTHOPAEDIC SURGERY

## 2020-03-06 PROCEDURE — 97162 PT EVAL MOD COMPLEX 30 MIN: CPT | Performed by: PHYSICAL THERAPIST

## 2020-03-06 PROCEDURE — 25010000002 ROPIVACAINE PER 1 MG: Performed by: ANESTHESIOLOGY

## 2020-03-06 PROCEDURE — 27447 TOTAL KNEE ARTHROPLASTY: CPT | Performed by: ORTHOPAEDIC SURGERY

## 2020-03-06 PROCEDURE — 25010000002 KETOROLAC TROMETHAMINE PER 15 MG: Performed by: ORTHOPAEDIC SURGERY

## 2020-03-06 PROCEDURE — 63710000001 DOCUSATE SODIUM 100 MG CAPSULE: Performed by: ORTHOPAEDIC SURGERY

## 2020-03-06 PROCEDURE — 63710000001 MUPIROCIN 2 % OINTMENT: Performed by: ORTHOPAEDIC SURGERY

## 2020-03-06 PROCEDURE — C1713 ANCHOR/SCREW BN/BN,TIS/BN: HCPCS | Performed by: ORTHOPAEDIC SURGERY

## 2020-03-06 PROCEDURE — 25010000002 PROPOFOL 10 MG/ML EMULSION: Performed by: NURSE ANESTHETIST, CERTIFIED REGISTERED

## 2020-03-06 PROCEDURE — 73560 X-RAY EXAM OF KNEE 1 OR 2: CPT

## 2020-03-06 PROCEDURE — A9270 NON-COVERED ITEM OR SERVICE: HCPCS | Performed by: ORTHOPAEDIC SURGERY

## 2020-03-06 PROCEDURE — 63710000001 MELOXICAM 15 MG TABLET: Performed by: ORTHOPAEDIC SURGERY

## 2020-03-06 PROCEDURE — 25010000002 FENTANYL CITRATE (PF) 100 MCG/2ML SOLUTION: Performed by: ANESTHESIOLOGY

## 2020-03-06 PROCEDURE — 63710000001 PANTOPRAZOLE 40 MG TABLET DELAYED-RELEASE: Performed by: ORTHOPAEDIC SURGERY

## 2020-03-06 PROCEDURE — C1776 JOINT DEVICE (IMPLANTABLE): HCPCS | Performed by: ORTHOPAEDIC SURGERY

## 2020-03-06 PROCEDURE — 25010000002 VANCOMYCIN 10 G RECONSTITUTED SOLUTION: Performed by: ORTHOPAEDIC SURGERY

## 2020-03-06 PROCEDURE — 63710000001 POLYETHYLENE GLYCOL PACK: Performed by: ORTHOPAEDIC SURGERY

## 2020-03-06 PROCEDURE — 25010000002 FENTANYL CITRATE (PF) 100 MCG/2ML SOLUTION: Performed by: NURSE ANESTHETIST, CERTIFIED REGISTERED

## 2020-03-06 PROCEDURE — 63710000001 PREGABALIN 75 MG CAPSULE: Performed by: ORTHOPAEDIC SURGERY

## 2020-03-06 PROCEDURE — 25010000002 MIDAZOLAM PER 1 MG: Performed by: ANESTHESIOLOGY

## 2020-03-06 PROCEDURE — 25010000003 CEFAZOLIN IN DEXTROSE 2-4 GM/100ML-% SOLUTION: Performed by: ORTHOPAEDIC SURGERY

## 2020-03-06 PROCEDURE — 82565 ASSAY OF CREATININE: CPT | Performed by: ORTHOPAEDIC SURGERY

## 2020-03-06 PROCEDURE — 27447 TOTAL KNEE ARTHROPLASTY: CPT | Performed by: NURSE PRACTITIONER

## 2020-03-06 PROCEDURE — 25010000002 NEOSTIGMINE PER 0.5 MG: Performed by: NURSE ANESTHETIST, CERTIFIED REGISTERED

## 2020-03-06 PROCEDURE — 97110 THERAPEUTIC EXERCISES: CPT | Performed by: PHYSICAL THERAPIST

## 2020-03-06 PROCEDURE — 25010000002 DEXAMETHASONE PER 1 MG: Performed by: NURSE ANESTHETIST, CERTIFIED REGISTERED

## 2020-03-06 PROCEDURE — 25010000002 ONDANSETRON PER 1 MG: Performed by: NURSE ANESTHETIST, CERTIFIED REGISTERED

## 2020-03-06 PROCEDURE — 63710000001 MASTISOL LIQUID: Performed by: ORTHOPAEDIC SURGERY

## 2020-03-06 PROCEDURE — 25010000003 BUPIVACAINE LIPOSOME 1.3 % SUSPENSION 20 ML VIAL: Performed by: ORTHOPAEDIC SURGERY

## 2020-03-06 PROCEDURE — 25010000002 HYDROMORPHONE PER 4 MG: Performed by: NURSE ANESTHETIST, CERTIFIED REGISTERED

## 2020-03-06 DEVICE — GENESIS II BICONVEX PATELLA 29MM
Type: IMPLANTABLE DEVICE | Site: KNEE | Status: FUNCTIONAL
Brand: GENESIS II

## 2020-03-06 DEVICE — IMPLANTABLE DEVICE: Type: IMPLANTABLE DEVICE | Site: KNEE | Status: FUNCTIONAL

## 2020-03-06 DEVICE — PALACOS® R IS A FAST-CURING, RADIOPAQUE, POLY(METHYL METHACRYLATE)-BASED BONE CEMENT.PALACOS ® R CONTAINS THE X-RAY CONTRAST MEDIUM ZIRCONIUM DIOXIDE. TO IMPROVE VISIBILITY IN THE SURGICAL FIELD PALACOS ® R HAS BEEN COLOURED WITH CHLOROPHYLL (E141). THE BONE CEMENT IS PREPARED DIRECTLY BEFORE USE BY MIXING A POLYMER POWDER COMPONENT WITH A LIQUID MONOMER COMPONENT. A DUCTILE DOUGH FORMS WHICH CURES WITHIN A FEW MINUTES.
Type: IMPLANTABLE DEVICE | Site: KNEE | Status: FUNCTIONAL
Brand: PALACOS®

## 2020-03-06 DEVICE — LEGION CRUCIATE RETAINING OXINIUM                                    FEMORAL SIZE 5 LEFT
Type: IMPLANTABLE DEVICE | Site: KNEE | Status: FUNCTIONAL
Brand: LEGION

## 2020-03-06 DEVICE — LEGION CR HIGH FLEX XLPE SZ 5-6 10MM
Type: IMPLANTABLE DEVICE | Site: KNEE | Status: FUNCTIONAL
Brand: LEGION

## 2020-03-06 DEVICE — GENESIS II NON-POROUS TIBIAL                                    BASEPLATE SIZE 6 LT
Type: IMPLANTABLE DEVICE | Site: KNEE | Status: FUNCTIONAL
Brand: GENESIS II

## 2020-03-06 RX ORDER — HYDRALAZINE HYDROCHLORIDE 20 MG/ML
5 INJECTION INTRAMUSCULAR; INTRAVENOUS
Status: DISCONTINUED | OUTPATIENT
Start: 2020-03-06 | End: 2020-03-06 | Stop reason: HOSPADM

## 2020-03-06 RX ORDER — MAGNESIUM HYDROXIDE 1200 MG/15ML
LIQUID ORAL AS NEEDED
Status: DISCONTINUED | OUTPATIENT
Start: 2020-03-06 | End: 2020-03-06 | Stop reason: HOSPADM

## 2020-03-06 RX ORDER — ASPIRIN 325 MG
325 TABLET, DELAYED RELEASE (ENTERIC COATED) ORAL EVERY 12 HOURS SCHEDULED
Status: DISCONTINUED | OUTPATIENT
Start: 2020-03-07 | End: 2020-03-07 | Stop reason: HOSPADM

## 2020-03-06 RX ORDER — SODIUM CHLORIDE, SODIUM LACTATE, POTASSIUM CHLORIDE, CALCIUM CHLORIDE 600; 310; 30; 20 MG/100ML; MG/100ML; MG/100ML; MG/100ML
9 INJECTION, SOLUTION INTRAVENOUS CONTINUOUS
Status: DISCONTINUED | OUTPATIENT
Start: 2020-03-06 | End: 2020-03-06 | Stop reason: HOSPADM

## 2020-03-06 RX ORDER — SODIUM CHLORIDE, SODIUM LACTATE, POTASSIUM CHLORIDE, CALCIUM CHLORIDE 600; 310; 30; 20 MG/100ML; MG/100ML; MG/100ML; MG/100ML
100 INJECTION, SOLUTION INTRAVENOUS CONTINUOUS
Status: DISCONTINUED | OUTPATIENT
Start: 2020-03-06 | End: 2020-03-07 | Stop reason: HOSPADM

## 2020-03-06 RX ORDER — ONDANSETRON 2 MG/ML
4 INJECTION INTRAMUSCULAR; INTRAVENOUS ONCE AS NEEDED
Status: DISCONTINUED | OUTPATIENT
Start: 2020-03-06 | End: 2020-03-06 | Stop reason: HOSPADM

## 2020-03-06 RX ORDER — MELOXICAM 15 MG/1
15 TABLET ORAL ONCE
Status: COMPLETED | OUTPATIENT
Start: 2020-03-06 | End: 2020-03-06

## 2020-03-06 RX ORDER — HYDROCODONE BITARTRATE AND ACETAMINOPHEN 7.5; 325 MG/1; MG/1
2 TABLET ORAL EVERY 4 HOURS PRN
Status: DISCONTINUED | OUTPATIENT
Start: 2020-03-06 | End: 2020-03-07 | Stop reason: HOSPADM

## 2020-03-06 RX ORDER — ONDANSETRON 4 MG/1
4 TABLET, FILM COATED ORAL EVERY 6 HOURS PRN
Status: DISCONTINUED | OUTPATIENT
Start: 2020-03-06 | End: 2020-03-07 | Stop reason: HOSPADM

## 2020-03-06 RX ORDER — EPHEDRINE SULFATE 50 MG/ML
5 INJECTION, SOLUTION INTRAVENOUS ONCE AS NEEDED
Status: DISCONTINUED | OUTPATIENT
Start: 2020-03-06 | End: 2020-03-06 | Stop reason: HOSPADM

## 2020-03-06 RX ORDER — FENTANYL CITRATE 50 UG/ML
50 INJECTION, SOLUTION INTRAMUSCULAR; INTRAVENOUS
Status: DISCONTINUED | OUTPATIENT
Start: 2020-03-06 | End: 2020-03-06 | Stop reason: HOSPADM

## 2020-03-06 RX ORDER — PREGABALIN 75 MG/1
150 CAPSULE ORAL ONCE
Status: COMPLETED | OUTPATIENT
Start: 2020-03-06 | End: 2020-03-06

## 2020-03-06 RX ORDER — HYDROCODONE BITARTRATE AND ACETAMINOPHEN 7.5; 325 MG/1; MG/1
1 TABLET ORAL ONCE AS NEEDED
Status: DISCONTINUED | OUTPATIENT
Start: 2020-03-06 | End: 2020-03-06 | Stop reason: HOSPADM

## 2020-03-06 RX ORDER — ACETAMINOPHEN 10 MG/ML
1000 INJECTION, SOLUTION INTRAVENOUS ONCE
Status: COMPLETED | OUTPATIENT
Start: 2020-03-06 | End: 2020-03-06

## 2020-03-06 RX ORDER — GLYCOPYRROLATE 0.2 MG/ML
INJECTION INTRAMUSCULAR; INTRAVENOUS AS NEEDED
Status: DISCONTINUED | OUTPATIENT
Start: 2020-03-06 | End: 2020-03-06 | Stop reason: SURG

## 2020-03-06 RX ORDER — HYDROMORPHONE HCL 110MG/55ML
PATIENT CONTROLLED ANALGESIA SYRINGE INTRAVENOUS AS NEEDED
Status: DISCONTINUED | OUTPATIENT
Start: 2020-03-06 | End: 2020-03-06 | Stop reason: SURG

## 2020-03-06 RX ORDER — DIPHENHYDRAMINE HYDROCHLORIDE 50 MG/ML
12.5 INJECTION INTRAMUSCULAR; INTRAVENOUS
Status: DISCONTINUED | OUTPATIENT
Start: 2020-03-06 | End: 2020-03-06 | Stop reason: HOSPADM

## 2020-03-06 RX ORDER — LIDOCAINE HYDROCHLORIDE 20 MG/ML
INJECTION, SOLUTION INFILTRATION; PERINEURAL AS NEEDED
Status: DISCONTINUED | OUTPATIENT
Start: 2020-03-06 | End: 2020-03-06 | Stop reason: SURG

## 2020-03-06 RX ORDER — IPRATROPIUM BROMIDE AND ALBUTEROL SULFATE 2.5; .5 MG/3ML; MG/3ML
3 SOLUTION RESPIRATORY (INHALATION) ONCE AS NEEDED
Status: DISCONTINUED | OUTPATIENT
Start: 2020-03-06 | End: 2020-03-06 | Stop reason: HOSPADM

## 2020-03-06 RX ORDER — KETOROLAC TROMETHAMINE 30 MG/ML
30 INJECTION, SOLUTION INTRAMUSCULAR; INTRAVENOUS EVERY 8 HOURS
Status: COMPLETED | OUTPATIENT
Start: 2020-03-06 | End: 2020-03-07

## 2020-03-06 RX ORDER — SODIUM CHLORIDE 0.9 % (FLUSH) 0.9 %
3-10 SYRINGE (ML) INJECTION AS NEEDED
Status: DISCONTINUED | OUTPATIENT
Start: 2020-03-06 | End: 2020-03-06 | Stop reason: HOSPADM

## 2020-03-06 RX ORDER — NEBIVOLOL 5 MG/1
2.5 TABLET ORAL DAILY
Status: DISCONTINUED | OUTPATIENT
Start: 2020-03-07 | End: 2020-03-07 | Stop reason: HOSPADM

## 2020-03-06 RX ORDER — CEFAZOLIN SODIUM 2 G/100ML
2 INJECTION, SOLUTION INTRAVENOUS EVERY 8 HOURS
Status: COMPLETED | OUTPATIENT
Start: 2020-03-06 | End: 2020-03-07

## 2020-03-06 RX ORDER — HYDROMORPHONE HYDROCHLORIDE 1 MG/ML
0.5 INJECTION, SOLUTION INTRAMUSCULAR; INTRAVENOUS; SUBCUTANEOUS
Status: DISCONTINUED | OUTPATIENT
Start: 2020-03-06 | End: 2020-03-06 | Stop reason: HOSPADM

## 2020-03-06 RX ORDER — FAMOTIDINE 10 MG/ML
20 INJECTION, SOLUTION INTRAVENOUS ONCE
Status: COMPLETED | OUTPATIENT
Start: 2020-03-06 | End: 2020-03-06

## 2020-03-06 RX ORDER — FLUMAZENIL 0.1 MG/ML
0.2 INJECTION INTRAVENOUS AS NEEDED
Status: DISCONTINUED | OUTPATIENT
Start: 2020-03-06 | End: 2020-03-06 | Stop reason: HOSPADM

## 2020-03-06 RX ORDER — LIDOCAINE HYDROCHLORIDE 10 MG/ML
0.5 INJECTION, SOLUTION EPIDURAL; INFILTRATION; INTRACAUDAL; PERINEURAL ONCE AS NEEDED
Status: DISCONTINUED | OUTPATIENT
Start: 2020-03-06 | End: 2020-03-06 | Stop reason: HOSPADM

## 2020-03-06 RX ORDER — EPHEDRINE SULFATE 50 MG/ML
INJECTION, SOLUTION INTRAVENOUS AS NEEDED
Status: DISCONTINUED | OUTPATIENT
Start: 2020-03-06 | End: 2020-03-06 | Stop reason: SURG

## 2020-03-06 RX ORDER — OXYCODONE AND ACETAMINOPHEN 7.5; 325 MG/1; MG/1
1 TABLET ORAL ONCE AS NEEDED
Status: DISCONTINUED | OUTPATIENT
Start: 2020-03-06 | End: 2020-03-06 | Stop reason: HOSPADM

## 2020-03-06 RX ORDER — ROPIVACAINE HYDROCHLORIDE 5 MG/ML
INJECTION, SOLUTION EPIDURAL; INFILTRATION; PERINEURAL
Status: COMPLETED | OUTPATIENT
Start: 2020-03-06 | End: 2020-03-06

## 2020-03-06 RX ORDER — ONDANSETRON 2 MG/ML
4 INJECTION INTRAMUSCULAR; INTRAVENOUS EVERY 6 HOURS PRN
Status: DISCONTINUED | OUTPATIENT
Start: 2020-03-06 | End: 2020-03-07 | Stop reason: HOSPADM

## 2020-03-06 RX ORDER — UREA 10 %
3 LOTION (ML) TOPICAL NIGHTLY PRN
Status: DISCONTINUED | OUTPATIENT
Start: 2020-03-06 | End: 2020-03-07 | Stop reason: HOSPADM

## 2020-03-06 RX ORDER — ACETAMINOPHEN 325 MG/1
650 TABLET ORAL ONCE AS NEEDED
Status: DISCONTINUED | OUTPATIENT
Start: 2020-03-06 | End: 2020-03-06 | Stop reason: HOSPADM

## 2020-03-06 RX ORDER — DOCUSATE SODIUM 100 MG/1
100 CAPSULE, LIQUID FILLED ORAL 2 TIMES DAILY
Status: DISCONTINUED | OUTPATIENT
Start: 2020-03-06 | End: 2020-03-07 | Stop reason: HOSPADM

## 2020-03-06 RX ORDER — ACETAMINOPHEN 325 MG/1
325 TABLET ORAL EVERY 4 HOURS PRN
Status: DISCONTINUED | OUTPATIENT
Start: 2020-03-06 | End: 2020-03-07 | Stop reason: HOSPADM

## 2020-03-06 RX ORDER — PROMETHAZINE HYDROCHLORIDE 25 MG/ML
12.5 INJECTION, SOLUTION INTRAMUSCULAR; INTRAVENOUS ONCE AS NEEDED
Status: DISCONTINUED | OUTPATIENT
Start: 2020-03-06 | End: 2020-03-06 | Stop reason: HOSPADM

## 2020-03-06 RX ORDER — PROMETHAZINE HYDROCHLORIDE 25 MG/1
25 SUPPOSITORY RECTAL ONCE AS NEEDED
Status: DISCONTINUED | OUTPATIENT
Start: 2020-03-06 | End: 2020-03-06 | Stop reason: HOSPADM

## 2020-03-06 RX ORDER — MELOXICAM 15 MG/1
15 TABLET ORAL DAILY
Status: DISCONTINUED | OUTPATIENT
Start: 2020-03-06 | End: 2020-03-07 | Stop reason: HOSPADM

## 2020-03-06 RX ORDER — MIDAZOLAM HYDROCHLORIDE 1 MG/ML
2 INJECTION INTRAMUSCULAR; INTRAVENOUS
Status: DISCONTINUED | OUTPATIENT
Start: 2020-03-06 | End: 2020-03-06 | Stop reason: HOSPADM

## 2020-03-06 RX ORDER — WOUND DRESSING ADHESIVE - LIQUID
LIQUID MISCELLANEOUS AS NEEDED
Status: DISCONTINUED | OUTPATIENT
Start: 2020-03-06 | End: 2020-03-06 | Stop reason: HOSPADM

## 2020-03-06 RX ORDER — DIPHENHYDRAMINE HCL 25 MG
25 CAPSULE ORAL
Status: DISCONTINUED | OUTPATIENT
Start: 2020-03-06 | End: 2020-03-06 | Stop reason: HOSPADM

## 2020-03-06 RX ORDER — FLUTICASONE PROPIONATE 50 MCG
2 SPRAY, SUSPENSION (ML) NASAL AS NEEDED
Status: DISCONTINUED | OUTPATIENT
Start: 2020-03-06 | End: 2020-03-07 | Stop reason: HOSPADM

## 2020-03-06 RX ORDER — MIDAZOLAM HYDROCHLORIDE 1 MG/ML
1 INJECTION INTRAMUSCULAR; INTRAVENOUS
Status: DISCONTINUED | OUTPATIENT
Start: 2020-03-06 | End: 2020-03-06 | Stop reason: HOSPADM

## 2020-03-06 RX ORDER — NALOXONE HCL 0.4 MG/ML
0.2 VIAL (ML) INJECTION AS NEEDED
Status: DISCONTINUED | OUTPATIENT
Start: 2020-03-06 | End: 2020-03-06 | Stop reason: HOSPADM

## 2020-03-06 RX ORDER — PROMETHAZINE HYDROCHLORIDE 25 MG/ML
6.25 INJECTION, SOLUTION INTRAMUSCULAR; INTRAVENOUS
Status: DISCONTINUED | OUTPATIENT
Start: 2020-03-06 | End: 2020-03-06 | Stop reason: HOSPADM

## 2020-03-06 RX ORDER — GABAPENTIN 100 MG/1
100 CAPSULE ORAL AS NEEDED
Status: DISCONTINUED | OUTPATIENT
Start: 2020-03-06 | End: 2020-03-07 | Stop reason: HOSPADM

## 2020-03-06 RX ORDER — ONDANSETRON 2 MG/ML
INJECTION INTRAMUSCULAR; INTRAVENOUS AS NEEDED
Status: DISCONTINUED | OUTPATIENT
Start: 2020-03-06 | End: 2020-03-06 | Stop reason: SURG

## 2020-03-06 RX ORDER — TRANEXAMIC ACID 100 MG/ML
INJECTION, SOLUTION INTRAVENOUS AS NEEDED
Status: DISCONTINUED | OUTPATIENT
Start: 2020-03-06 | End: 2020-03-06 | Stop reason: SURG

## 2020-03-06 RX ORDER — PROPOFOL 10 MG/ML
VIAL (ML) INTRAVENOUS AS NEEDED
Status: DISCONTINUED | OUTPATIENT
Start: 2020-03-06 | End: 2020-03-06 | Stop reason: SURG

## 2020-03-06 RX ORDER — FENTANYL CITRATE 50 UG/ML
INJECTION, SOLUTION INTRAMUSCULAR; INTRAVENOUS AS NEEDED
Status: DISCONTINUED | OUTPATIENT
Start: 2020-03-06 | End: 2020-03-06 | Stop reason: SURG

## 2020-03-06 RX ORDER — HYDROCODONE BITARTRATE AND ACETAMINOPHEN 7.5; 325 MG/1; MG/1
1 TABLET ORAL EVERY 4 HOURS PRN
Status: DISCONTINUED | OUTPATIENT
Start: 2020-03-06 | End: 2020-03-07 | Stop reason: HOSPADM

## 2020-03-06 RX ORDER — DEXAMETHASONE SODIUM PHOSPHATE 10 MG/ML
INJECTION INTRAMUSCULAR; INTRAVENOUS AS NEEDED
Status: DISCONTINUED | OUTPATIENT
Start: 2020-03-06 | End: 2020-03-06 | Stop reason: SURG

## 2020-03-06 RX ORDER — CLINDAMYCIN PHOSPHATE 900 MG/50ML
900 INJECTION INTRAVENOUS ONCE
Status: COMPLETED | OUTPATIENT
Start: 2020-03-06 | End: 2020-03-06

## 2020-03-06 RX ORDER — SODIUM CHLORIDE 0.9 % (FLUSH) 0.9 %
3 SYRINGE (ML) INJECTION EVERY 12 HOURS SCHEDULED
Status: DISCONTINUED | OUTPATIENT
Start: 2020-03-06 | End: 2020-03-06 | Stop reason: HOSPADM

## 2020-03-06 RX ORDER — PROMETHAZINE HYDROCHLORIDE 25 MG/1
25 TABLET ORAL ONCE AS NEEDED
Status: DISCONTINUED | OUTPATIENT
Start: 2020-03-06 | End: 2020-03-06 | Stop reason: HOSPADM

## 2020-03-06 RX ORDER — PANTOPRAZOLE SODIUM 40 MG/1
40 TABLET, DELAYED RELEASE ORAL
Status: DISCONTINUED | OUTPATIENT
Start: 2020-03-06 | End: 2020-03-07 | Stop reason: HOSPADM

## 2020-03-06 RX ADMIN — HYDROMORPHONE HYDROCHLORIDE 0.2 MG: 2 INJECTION, SOLUTION INTRAMUSCULAR; INTRAVENOUS; SUBCUTANEOUS at 08:42

## 2020-03-06 RX ADMIN — SODIUM CHLORIDE, POTASSIUM CHLORIDE, SODIUM LACTATE AND CALCIUM CHLORIDE 100 ML/HR: 600; 310; 30; 20 INJECTION, SOLUTION INTRAVENOUS at 17:57

## 2020-03-06 RX ADMIN — LIDOCAINE HYDROCHLORIDE 60 MG: 20 INJECTION, SOLUTION INFILTRATION; PERINEURAL at 07:11

## 2020-03-06 RX ADMIN — FENTANYL CITRATE 25 MCG: 50 INJECTION INTRAMUSCULAR; INTRAVENOUS at 08:31

## 2020-03-06 RX ADMIN — CEFAZOLIN SODIUM 2 G: 2 INJECTION, SOLUTION INTRAVENOUS at 13:07

## 2020-03-06 RX ADMIN — ACETAMINOPHEN 1000 MG: 10 INJECTION, SOLUTION INTRAVENOUS at 07:17

## 2020-03-06 RX ADMIN — HYDROMORPHONE HYDROCHLORIDE 0.2 MG: 2 INJECTION, SOLUTION INTRAMUSCULAR; INTRAVENOUS; SUBCUTANEOUS at 08:46

## 2020-03-06 RX ADMIN — GLYCOPYRROLATE 0.2 MG: 0.2 INJECTION INTRAMUSCULAR; INTRAVENOUS at 07:39

## 2020-03-06 RX ADMIN — PROPOFOL 150 MG: 10 INJECTION, EMULSION INTRAVENOUS at 07:11

## 2020-03-06 RX ADMIN — MUPIROCIN 1 APPLICATION: 20 OINTMENT TOPICAL at 20:13

## 2020-03-06 RX ADMIN — KETOROLAC TROMETHAMINE 30 MG: 30 INJECTION, SOLUTION INTRAMUSCULAR at 16:50

## 2020-03-06 RX ADMIN — DOCUSATE SODIUM 100 MG: 100 CAPSULE, LIQUID FILLED ORAL at 20:12

## 2020-03-06 RX ADMIN — CEFAZOLIN SODIUM 2 G: 2 INJECTION, SOLUTION INTRAVENOUS at 22:38

## 2020-03-06 RX ADMIN — FENTANYL CITRATE 50 MCG: 50 INJECTION INTRAMUSCULAR; INTRAVENOUS at 06:54

## 2020-03-06 RX ADMIN — HYDROMORPHONE HYDROCHLORIDE 0.2 MG: 2 INJECTION, SOLUTION INTRAMUSCULAR; INTRAVENOUS; SUBCUTANEOUS at 08:36

## 2020-03-06 RX ADMIN — SODIUM CHLORIDE, POTASSIUM CHLORIDE, SODIUM LACTATE AND CALCIUM CHLORIDE 100 ML/HR: 600; 310; 30; 20 INJECTION, SOLUTION INTRAVENOUS at 09:48

## 2020-03-06 RX ADMIN — GLYCOPYRROLATE 0.4 MG: 0.2 INJECTION INTRAMUSCULAR; INTRAVENOUS at 08:23

## 2020-03-06 RX ADMIN — EPHEDRINE SULFATE 15 MG: 50 INJECTION INTRAVENOUS at 07:22

## 2020-03-06 RX ADMIN — DOCUSATE SODIUM 100 MG: 100 CAPSULE, LIQUID FILLED ORAL at 13:07

## 2020-03-06 RX ADMIN — FAMOTIDINE 20 MG: 10 INJECTION INTRAVENOUS at 06:50

## 2020-03-06 RX ADMIN — DEXAMETHASONE SODIUM PHOSPHATE 8 MG: 10 INJECTION INTRAMUSCULAR; INTRAVENOUS at 07:18

## 2020-03-06 RX ADMIN — NEOSTIGMINE METHYLSULFATE 3 MG: 1 INJECTION INTRAMUSCULAR; INTRAVENOUS; SUBCUTANEOUS at 08:23

## 2020-03-06 RX ADMIN — CLINDAMYCIN PHOSPHATE 900 MG: 18 INJECTION, SOLUTION INTRAMUSCULAR; INTRAVENOUS at 07:17

## 2020-03-06 RX ADMIN — PREGABALIN 150 MG: 75 CAPSULE ORAL at 05:49

## 2020-03-06 RX ADMIN — MELOXICAM 15 MG: 15 TABLET ORAL at 05:49

## 2020-03-06 RX ADMIN — POLYETHYLENE GLYCOL 3350 17 G: 17 POWDER, FOR SOLUTION ORAL at 20:13

## 2020-03-06 RX ADMIN — ROPIVACAINE HYDROCHLORIDE 30 ML: 5 INJECTION, SOLUTION EPIDURAL; INFILTRATION; PERINEURAL at 06:50

## 2020-03-06 RX ADMIN — HYDROMORPHONE HYDROCHLORIDE 0.2 MG: 2 INJECTION, SOLUTION INTRAMUSCULAR; INTRAVENOUS; SUBCUTANEOUS at 08:49

## 2020-03-06 RX ADMIN — TRANEXAMIC ACID 1000 MG: 100 INJECTION, SOLUTION INTRAVENOUS at 08:17

## 2020-03-06 RX ADMIN — POLYETHYLENE GLYCOL 3350 17 G: 17 POWDER, FOR SOLUTION ORAL at 13:07

## 2020-03-06 RX ADMIN — EPHEDRINE SULFATE 15 MG: 50 INJECTION INTRAVENOUS at 07:17

## 2020-03-06 RX ADMIN — VANCOMYCIN HYDROCHLORIDE 1250 MG: 10 INJECTION, POWDER, LYOPHILIZED, FOR SOLUTION INTRAVENOUS at 06:04

## 2020-03-06 RX ADMIN — MIDAZOLAM 2 MG: 1 INJECTION INTRAMUSCULAR; INTRAVENOUS at 06:50

## 2020-03-06 RX ADMIN — FENTANYL CITRATE 50 MCG: 50 INJECTION INTRAMUSCULAR; INTRAVENOUS at 07:00

## 2020-03-06 RX ADMIN — ONDANSETRON HYDROCHLORIDE 4 MG: 2 SOLUTION INTRAMUSCULAR; INTRAVENOUS at 08:41

## 2020-03-06 RX ADMIN — PANTOPRAZOLE SODIUM 40 MG: 40 TABLET, DELAYED RELEASE ORAL at 13:07

## 2020-03-06 RX ADMIN — HYDROMORPHONE HYDROCHLORIDE 0.2 MG: 2 INJECTION, SOLUTION INTRAMUSCULAR; INTRAVENOUS; SUBCUTANEOUS at 08:39

## 2020-03-06 RX ADMIN — SODIUM CHLORIDE, POTASSIUM CHLORIDE, SODIUM LACTATE AND CALCIUM CHLORIDE 9 ML/HR: 600; 310; 30; 20 INJECTION, SOLUTION INTRAVENOUS at 06:50

## 2020-03-06 RX ADMIN — KETOROLAC TROMETHAMINE 30 MG: 30 INJECTION, SOLUTION INTRAMUSCULAR at 09:15

## 2020-03-06 RX ADMIN — FENTANYL CITRATE 25 MCG: 50 INJECTION INTRAMUSCULAR; INTRAVENOUS at 08:27

## 2020-03-06 NOTE — ANESTHESIA PROCEDURE NOTES
Peripheral Block    Pre-sedation assessment completed: 3/6/2020 6:40 AM    Patient location during procedure: holding area  Start time: 3/6/2020 6:40 AM  Stop time: 3/6/2020 6:50 AM  Reason for block: at surgeon's request and post-op pain management  Performed by  Anesthesiologist: Nina Mullins MD  Preanesthetic Checklist  Completed: patient identified, site marked, surgical consent, pre-op evaluation, timeout performed, IV checked, risks and benefits discussed and monitors and equipment checked  Prep:  Pt Position: supine  Sterile barriers:cap, gloves and sterile barriers  Prep: ChloraPrep  Patient monitoring: blood pressure monitoring, continuous pulse oximetry and EKG  Procedure  Sedation:yes  Performed under: local infiltration  Guidance:ultrasound guided  ULTRASOUND INTERPRETATION.  Using ultrasound guidance a 20 G gauge needle was placed in close proximity to the femoral nerve, at which point, under ultrasound guidance anesthetic was injected in the area of the nerve and spread of the anesthesia was seen on ultrasound in close proximity thereto.  There were no abnormalities seen on ultrasound; a digital image was taken; and the patient tolerated the procedure with no complications. Images:still images obtained, printed/placed on chart    Laterality:left  Block Type:adductor canal block  Injection Technique:single-shot  Needle Type:echogenic  Needle Gauge:20 G      Medications Used: ropivacaine (NAROPIN) 0.5 % injection, 30 mL  Med admintered at 3/6/2020 6:50 AM

## 2020-03-06 NOTE — THERAPY EVALUATION
Patient Name: Yunior Anguiano  : 1945    MRN: [id number]                              Today's Date: 3/6/2020       Admit Date: 3/6/2020    Visit Dx:     ICD-10-CM ICD-9-CM   1. Primary osteoarthritis of knee, unspecified laterality M17.10 715.16     Patient Active Problem List   Diagnosis   • Primary osteoarthritis of knee   • OA (osteoarthritis) of knee     Past Medical History:   Diagnosis Date   • Arthritis     LEFT KNEE-GETS STIFF AFTER SITTING AND UNSTABLE GETTING UP FROM SITTING POSITION   • Cardiac disease    • Cardiac murmur    • History of chest pain    • History of fractured pelvis    • History of pancreatitis    • Hypertension    • Sleep apnea      Past Surgical History:   Procedure Laterality Date   • CARPAL TUNNEL RELEASE Right 2018   • CATARACT EXTRACTION, BILATERAL     • CHOLECYSTECTOMY     • CORONARY STENT PLACEMENT      mid LAD   • FOOT SURGERY Left     bone growth removal   • HEMORRHOIDECTOMY     • JOINT REPLACEMENT Right     knee   • KNEE SURGERY Right     ACL repair   • RETINAL DETACHMENT REPAIR Right      General Information     Row Name 20 1153          PT Evaluation Time/Intention    Document Type  evaluation  -     Mode of Treatment  individual therapy;physical therapy  -     Row Name 20 1153          General Information    Prior Level of Function  independent:  -     Existing Precautions/Restrictions  fall  -     Barriers to Rehab  none identified  -     Row Name 20 1153          Relationship/Environment    Lives With  spouse  -     Row Name 20 1416          Home Main Entrance    Number of Stairs, Main Entrance  five  -     Row Name 20 1416          Cognitive Assessment/Intervention- PT/OT    Orientation Status (Cognition)  oriented x 4  -       User Key  (r) = Recorded By, (t) = Taken By, (c) = Cosigned By    Initials Name Provider Type    Irma Lange, PT Physical Therapist        Mobility     [facility]  Name 03/06/20 1417          Bed Mobility Assessment/Treatment    Bed Mobility Assessment/Treatment  bed mobility (all) activities  -     [place] Level (Bed Mobility)  contact guard assist  -     Assistive Device (Bed Mobility)  bed rails;head of bed elevated  -     Row Name 03/06/20 1417          Sit-Stand Transfer    Sit-Stand [place] (Transfers)  contact guard  -KH     Assistive Device (Sit-Stand Transfers)  walker, front-wheeled  -KH     Row Name 03/06/20 1417          Gait/Stairs Assessment/Training    [place] Level (Gait)  contact guard  -KH     Assistive Device (Gait)  walker, front-wheeled  -KH     Distance in Feet (Gait)  40  -KH     Deviations/Abnormal Patterns (Gait)  antalgic;gait speed decreased;justina decreased  -       User Key  (r) = Recorded By, (t) = Taken By, (c) = Cosigned By    Initials Name Provider Type    Irma Lange, AGUS Physical Therapist        Obj/Interventions     Row Name 03/06/20 1417          General ROM    GENERAL ROM COMMENTS  WFL except L knee  -     Row Name 03/06/20 1417          Therapeutic Exercise    Comment (Therapeutic Exercise)  L TKA protocol x 10 reps  -       User Key  (r) = Recorded By, (t) = Taken By, (c) = Cosigned By    Initials Name Provider Type    Irma Lange PT Physical Therapist        Goals/Plan     Row Name 03/06/20 1418          Transfer Goal 1 (PT)    Activity/Assistive Device (Transfer Goal 1, PT)  transfers, all  -     [place] Level/Cues Needed (Transfer Goal 1, PT)  supervision required  -KH     Time Frame (Transfer Goal 1, PT)  3 days  -     Row Name 03/06/20 1418          Gait Training Goal 1 (PT)    Activity/Assistive Device (Gait Training Goal 1, PT)  gait (walking locomotion);walker, rolling  -     [place] Level (Gait Training Goal 1, PT)  supervision required  -KH     Distance (Gait Goal 1, PT)  100 ft  -KH     Time Frame (Gait Training Goal 1, PT)  3 days  -     Row Name  03/06/20 1418          ROM Goal 1 (PT)    ROM Goal 1 (PT)  L knee 0-90  -[facility] Name 03/06/20 1418          Stairs Goal 1 (PT)    Activity/Assistive Device (Stairs Goal 1, PT)  stairs, all skills  -     [place] Level/Cues Needed (Stairs Goal 1, PT)  contact guard assist  -     Number of Stairs (Stairs Goal 1, PT)  4  -KH     Time Frame (Stairs Goal 1, PT)  3 days  -[facility] Name 03/06/20 1418          Patient Education Goal (PT)    Activity (Patient Education Goal, PT)  HEP  -     [place]/Cues/Accuracy (Memory Goal 2, PT)  demonstrates adequately  -     Time Frame (Patient Education Goal, PT)  3 days  -       User Key  (r) = Recorded By, (t) = Taken By, (c) = Cosigned By    Initials Name Provider Type    Irma Lange, PT Physical Therapist        Clinical Impression     [facility] Name 03/06/20 1418          Pain Assessment    Additional Documentation  Pain Scale: Numbers Pre/Post-Treatment (Group)  -KH     Row Name 03/06/20 1418          Pain Scale: Numbers Pre/Post-Treatment    Pain Scale: Numbers, Pretreatment  0/10 - no pain  -     Pain Scale: Numbers, Post-Treatment  0/10 - no pain  -[facility] Name 03/06/20 1418          Plan of Care Review    Plan of Care Reviewed With  patient;spouse  -KH     Row Name 03/06/20 [facility]8          Physical Therapy Clinical Impression    Criteria for Skilled Interventions Met (PT Clinical Impression)  treatment indicated  -     Rehab Potential (PT Clinical Summary)  good, to achieve stated therapy goals  -KH     Row Name 03/06/20 1418          Positioning and Restraints    Pre-Treatment Position  in bed  -     Post Treatment Position  chair  -KH     In Chair  notified nsg;reclined;call light within reach;encouraged to call for assist;exit alarm on;with family/caregiver  -       User Key  (r) = Recorded By, (t) = Taken By, (c) = Cosigned By    Initials Name Provider Type    Irma Lange, PT Physical Therapist        Outcome  Measures     Row Name 03/06/20 1419          How much help from another person do you currently need...    Turning from your back to your side while in flat bed without using bedrails?  4  -KH     Moving from lying on back to sitting on the side of a flat bed without bedrails?  4  -KH     Moving to and from a bed to a chair (including a wheelchair)?  3  -KH     Standing up from a chair using your arms (e.g., wheelchair, bedside chair)?  3  -KH     Climbing 3-5 steps with a railing?  3  -KH     To walk in hospital room?  3  -KH     AM-PAC 6 Clicks Score (PT)  20  -     Row Name 03/06/20 1419          Functional Assessment    Outcome Measure Options  AM-PAC 6 Clicks Basic Mobility (PT)  -       User Key  (r) = Recorded By, (t) = Taken By, (c) = Cosigned By    Initials Name Provider Type    Irma Lange, PT Physical Therapist        Physical Therapy Education                 Title: PT OT SLP Therapies (Done)     Topic: Physical Therapy (Done)     Point: Mobility training (Done)     Description:   Instruct learner(s) on safety and technique for assisting patient out of bed, chair or wheelchair.  Instruct in the proper use of assistive devices, such as walker, crutches, cane or brace.              Patient Friendly Description:   It's important to get you on your feet again, but we need to do so in a way that is safe for you. Falling has serious consequences, and your personal safety is the most important thing of all.        When it's time to get out of bed, one of us or a family member will sit next to you on the bed to give you support.     If your doctor or nurse tells you to use a walker, crutches, a cane, or a brace, be sure you use it every time you get out of bed, even if you think you don't need it.    Learning Progress Summary           Patient Acceptance, E,TB, VU by BALBINA at 3/6/2020 1419                   Point: Home exercise program (Done)     Description:   Instruct learner(s) on appropriate  technique for monitoring, assisting and/or progressing patient with therapeutic exercises and activities.              Learning Progress Summary           Patient Acceptance, E,TB, VU by  at 3/6/2020 1419                   Point: Body mechanics (Done)     Description:   Instruct learner(s) on proper positioning and spine alignment for patient and/or caregiver during mobility tasks and/or exercises.              Learning Progress Summary           Patient Acceptance, E,TB, VU by  at 3/6/2020 1419                   Point: Precautions (Done)     Description:   Instruct learner(s) on prescribed precautions during mobility and gait tasks              Learning Progress Summary           Patient Acceptance, E,TB, VU by  at 3/6/2020 1419                               User Key     Initials Effective Dates Name Provider Type [facility] 02/05/19 -  Irma Gamez, PT Physical Therapist PT              PT Recommendation and Plan     Outcome Summary/Treatment Plan (PT)  Anticipated Discharge Disposition (PT): home with home health  Plan of Care Reviewed With: patient, spouse  Outcome Summary: Pt is s/p L TKA and presents with limited ROM and antalgic gait. Pt would benefit from PT to address his impairments and work towards outlined goals. Pt plans to d/c home and has equipment.     Time Calculation:   PT Charges     Row Name 03/06/20 1153             Time Calculation    Start Time  1148  -      Stop Time  1205  -      Time Calculation (min)  17 min  -      PT Received On  03/06/20  -      PT - Next Appointment  03/07/20  -      PT Goal Re-Cert Due Date  03/13/20  -         Time Calculation- PT    Total Timed Code Minutes- PT  8 minute(s)  -        User Key  (r) = Recorded By, (t) = Taken By, (c) = Cosigned By    Initials Name Provider Type    Irma Lange, PT Physical Therapist        Therapy Charges for Today     Code Description Service Date Service Provider Modifiers Qty     35055630511 HC PT EVAL MOD COMPLEXITY 2 3/6/2020 Irma Gamze, PT GP 1    17050751161 HC PT THER PROC EA 15 MIN 3/6/2020 Irma Gamez, PT GP 1          PT G-Codes  Outcome Measure Options: AM-PAC 6 Clicks Basic Mobility (PT)  AM-PAC 6 Clicks Score (PT): 20    Irma Gamez, PT  3/6/2020

## 2020-03-06 NOTE — DISCHARGE PLACEMENT REQUEST
"Yunior Yee (74 y.o. Male)     Date of Birth Social Security Number Address Home Phone MRN    1945  [address] [address] [phone] [id number]    Rastafarian Marital Status          Unknown        Admission Date Admission Type Admitting Provider Attending Provider Department, Room/Bed    3/6/20 Elective Obed Ramos MD Brown, Reid B, MD [address], P776/1    Discharge Date Discharge Disposition Discharge Destination                       Attending Provider:  Obed Ramos MD    Allergies:  Amlodipine Besylate, Cefdinir, Losartan, Statins, Turmeric, Erythromycin Base    Isolation:  None   Infection:  None   Code Status:  CPR    Ht:  175.3 cm (69.02\")   Wt:  83.7 kg (184 lb 8.4 oz)    Admission Cmt:  None   Principal Problem:  Primary osteoarthritis of knee [M17.10] More...                 Active Insurance as of 3/6/2020     Primary Coverage     Payor Plan Insurance Group Employer/Plan Group    HUMANA MEDICARE REPLACEMENT HUMANA MEDICARE REPLACEMENT [id number]     Payor Plan Address Payor Plan Phone Number Payor Plan Fax Number Effective Dates    [address] [address] [phone]  1/1/2018 - None Entered    [facility] [id number]       Subscriber Name Subscriber Birth Date Member ID       YUNIOR YEE 1945 [id number]                 Emergency Contacts      (Rel.) Home Phone Work Phone Mobile Phone    JesusJocelyne foley (Spouse) -- -- [phone]              "

## 2020-03-06 NOTE — ANESTHESIA POSTPROCEDURE EVALUATION
Patient: Yunior Anguiano    Procedure Summary     Date:  03/06/20 Room / Location:  [facility] OR [address] [place] [address] MAIN OR    Anesthesia Start:  0657 Anesthesia Stop:  0859    Procedure:  TOTAL KNEE ARTHROPLASTY (Left Knee) Diagnosis:       Primary osteoarthritis of knee, unspecified laterality      (Primary osteoarthritis of knee, unspecified laterality [M17.10])    Surgeon:  Obed Ramos MD Provider:  Nina Mullins MD    Anesthesia Type:  general ASA Status:  2          Anesthesia Type: general    Vitals  Vitals Value Taken Time   /81 3/6/2020  9:15 AM   Temp 36.4 °C (97.5 °F) 3/6/2020  8:50 AM   Pulse 53 3/6/2020  9:29 AM   Resp 16 3/6/2020  9:15 AM   SpO2 95 % 3/6/2020  9:29 AM   Vitals shown include unvalidated device data.        Post Anesthesia Care and Evaluation    Patient location during evaluation: bedside  Patient participation: complete - patient participated  Level of consciousness: awake and alert  Pain management: adequate  Airway patency: patent  Anesthetic complications: No anesthetic complications  PONV Status: controlled  Cardiovascular status: acceptable  Respiratory status: acceptable  Hydration status: acceptable

## 2020-03-06 NOTE — OP NOTE
Name: Yunior Anguiano  YOB: 1945    DATE OF SURGERY: 3/6/2020    PREOPERATIVE DIAGNOSIS: Left knee end-stage osteoarthritis    POSTOPERATIVE DIAGNOSIS: Left knee end-stage osteoarthritis    PROCEDURE PERFORMED: Left total knee replacement    SURGEON: Obed Ramos M.D.    ASSISTANT: JOAQUIN PATE    IMPLANTS: [employer]:     Implant Name Type Inv. Item Serial No.  Lot No. LRB No. Used   CMT BONE PALACOS R HI/VISC 1X40 - [id number] Implant CMT BONE PALACOS R HI/VISC 1X40  [facility] [id number] Left 2   COMP FEM LEGION OXINIUM CR SZ5 LT - [id number] Implant COMP FEM LEGION OXINIUM CR SZ5 LT  [employer] [id number] Left 1   BASE TIB/KN GEN2 NONPOR TI SZ6 LT - [id number] Implant BASE TIB/KN GEN2 NONPOR TI SZ6 LT  [employer] [id number] Left 1   PAT GEN2 BICONVEX [id number] - [id number] Implant PAT GEN2 BICONVEX [id number]  [employer] [id number] Left 1   INSRT ART LEGION CR HF XLPE SZ5TO6 10MM - [id number] Implant INSRT ART LEGION CR HF XLPE SZ5TO6 10MM  [employer] [id number] Left 1       Estimated Blood Loss: 200cc  Specimens : none  Complications: none    DESCRIPTION OF PROCEDURE: The patient was taken to the operating room and placed in the supine position. A sequential compression device was carefully placed on the non-operative leg. Preoperative antibiotics were administered. Surgical time out was performed. After adequate induction of anesthesia, the leg was prepped and draped in the usual sterile fashion, exsanguinated with an Esmarch bandage and the tourniquet inflated to 250 mmHg. A midline incision was performed followed by a medial parapatellar arthrotomy. The patella was subluxed laterally.  A portion of the fat pad, ACL, and anterior horns of the meniscus were excised. The drill hole was placed in the distal femur and the canal was the irrigated and suctioned. The IM layne was placed and a 5 degree distal valgus cut was performed on the femur. The  femur was then sized with a sizing guide. The femoral cutting block was placed and all femoral cuts were performed. The proximal tibia was exposed. We used the extramedullary tibial cutting guide set for removal of 9mm of bone off the high side. The tibial cut was performed. The posterior horns of the menisci were excised. The posterior osteophytes were removed. Flexion extension blocks were then used to balance the knee. The tibial cut surface was then sized with the sizing templates and the tibial and femoral trial were then placed. The knee was placed in full extension and then the tibial tray rotation was then matched to the femoral rotation and marked.    Attention was then placed to the patella. The patella was noted to track centrally through range of motion. The patella was then sized with the trials. The thickness of the patella was then measured. The patella was resurfaced and the surrounding osteophytes were removed. The preoperative thickness was reproduced. The patella tracked centrally through range of motion.   At this point all trial components were removed, the knee was copiously irrigated with pulsed lavage, and the knee was injected with anesthetic cocktail solution. The cut surfaces were then dried with clean lap sponges, and the components were cemented tibia, followed by femur, then patella. The knee was held in full extension and all excess cement was removed. The knee was held still until the cement had completely hardened. We then placed the trial polyethylene spacer which resulted in full extension and excellent flexion-extension balance. We placed the final polyethylene spacer.   The knee was then copiously irrigated. The tourniquet was then released. There was excellent hemostasis. We placed a one-eighth inch Hemovac drain. We closed the knee in multiple layers in standard fashion. Sterile dressing were applied. At the end of the case, the sponge and needle counts were reported as being  correct. There were no known complications. The patient was then transported to the recovery room.      Obed Ramos M.D.

## 2020-03-06 NOTE — PLAN OF CARE
Problem: Patient Care Overview  Goal: Plan of Care Review  3/6/2020 1420 by Irma Gamez, PT  Flowsheets (Taken 3/6/2020 1420)  Outcome Summary: Pt is s/p L TKA and presents with limited ROM and antalgic gait. Pt would benefit from PT to address his impairments and work towards outlined goals. Pt plans to d/c home and has equipment.  3/6/2020 1419 by Irma Gamez, PT  Reactivated

## 2020-03-06 NOTE — ANESTHESIA PROCEDURE NOTES
Airway  Urgency: elective    Date/Time: 3/6/2020 7:14 AM  Airway not difficult    General Information and Staff    Patient location during procedure: OR  Anesthesiologist: Nina Mlulins MD  CRNA: Breana Marlow CRNA    Indications and Patient Condition  Indications for airway management: airway protection    Preoxygenated: yes  MILS maintained throughout  Mask difficulty assessment: 2 - vent by mask + OA or adjuvant +/- NMBA    Final Airway Details  Final airway type: endotracheal airway      Successful airway: ETT  Cuffed: yes   Successful intubation technique: direct laryngoscopy  Facilitating devices/methods: intubating stylet and anterior pressure/BURP  Endotracheal tube insertion site: oral  Blade: Cal  Blade size: 3  ETT size (mm): 7.5  Cormack-Lehane Classification: grade IIa - partial view of glottis  Placement verified by: chest auscultation and capnometry   Cuff volume (mL): 8  Measured from: lips  ETT/EBT  to lips (cm): 21  Number of attempts at approach: 1  Assessment: lips, teeth, and gum same as pre-op and atraumatic intubation    Additional Comments  Atraumatic ET Tube placement.  Teeth as pre-op. BLEBS.  -ABD sounds.  +ET CO2.  Secured to face

## 2020-03-06 NOTE — PLAN OF CARE
Pt s/p L TKA. No c/o pain. Ambulated with PT more than 20 feet within the first 4 hours of leaving PACU. VSS. RA. Cherise with ace wrap C/D/I. Hemovac in place. Due to void by 5pm. Plans for home with HH tomorrow. Will continue to monitor.

## 2020-03-06 NOTE — ANESTHESIA PREPROCEDURE EVALUATION
Anesthesia Evaluation     Patient summary reviewed and Nursing notes reviewed   NPO Solid Status: > 8 hours  NPO Liquid Status: > 2 hours           Airway   Mallampati: II  TM distance: >3 FB  Neck ROM: full  No difficulty expected  Dental - normal exam     Pulmonary - normal exam    breath sounds clear to auscultation  (+) sleep apnea on CPAP,   Cardiovascular     Rhythm: regular  Rate: normal    (+) hypertension, valvular problems/murmurs murmur, murmur,       Neuro/Psych- negative ROS  GI/Hepatic/Renal/Endo - negative ROS     Musculoskeletal     Abdominal  - normal exam   Substance History - negative use     OB/GYN negative ob/gyn ROS         Other   arthritis,                      Anesthesia Plan    ASA 2     general   (L adductor canal block)  intravenous induction     Anesthetic plan, all risks, benefits, and alternatives have been provided, discussed and informed consent has been obtained with: patient.

## 2020-03-07 VITALS
HEART RATE: 109 BPM | WEIGHT: 184.53 LBS | TEMPERATURE: 96.7 F | DIASTOLIC BLOOD PRESSURE: 56 MMHG | OXYGEN SATURATION: 97 % | SYSTOLIC BLOOD PRESSURE: 111 MMHG | HEIGHT: 69 IN | RESPIRATION RATE: 16 BRPM | BODY MASS INDEX: 27.33 KG/M2

## 2020-03-07 LAB
HCT VFR BLD AUTO: 33 % (ref 37.5–51)
HGB BLD-MCNC: 11.2 G/DL (ref 13–17.7)

## 2020-03-07 PROCEDURE — 63710000001 MELOXICAM 15 MG TABLET: Performed by: ORTHOPAEDIC SURGERY

## 2020-03-07 PROCEDURE — 63710000001 ASPIRIN EC 325 MG TABLET DELAYED-RELEASE: Performed by: ORTHOPAEDIC SURGERY

## 2020-03-07 PROCEDURE — A9270 NON-COVERED ITEM OR SERVICE: HCPCS | Performed by: ORTHOPAEDIC SURGERY

## 2020-03-07 PROCEDURE — 85018 HEMOGLOBIN: CPT | Performed by: ORTHOPAEDIC SURGERY

## 2020-03-07 PROCEDURE — 63710000001 NEBIVOLOL 5 MG TABLET: Performed by: ORTHOPAEDIC SURGERY

## 2020-03-07 PROCEDURE — 63710000001 POLYETHYLENE GLYCOL PACK: Performed by: ORTHOPAEDIC SURGERY

## 2020-03-07 PROCEDURE — 97110 THERAPEUTIC EXERCISES: CPT

## 2020-03-07 PROCEDURE — 97150 GROUP THERAPEUTIC PROCEDURES: CPT

## 2020-03-07 PROCEDURE — 63710000001 PANTOPRAZOLE 40 MG TABLET DELAYED-RELEASE: Performed by: ORTHOPAEDIC SURGERY

## 2020-03-07 PROCEDURE — 63710000001 DOCUSATE SODIUM 100 MG CAPSULE: Performed by: ORTHOPAEDIC SURGERY

## 2020-03-07 PROCEDURE — 63710000001 MUPIROCIN 2 % OINTMENT: Performed by: ORTHOPAEDIC SURGERY

## 2020-03-07 PROCEDURE — 25010000002 KETOROLAC TROMETHAMINE PER 15 MG: Performed by: ORTHOPAEDIC SURGERY

## 2020-03-07 PROCEDURE — 85014 HEMATOCRIT: CPT | Performed by: ORTHOPAEDIC SURGERY

## 2020-03-07 RX ORDER — PANTOPRAZOLE SODIUM 40 MG/1
40 TABLET, DELAYED RELEASE ORAL DAILY
Qty: 14 TABLET | Refills: 0 | Status: SHIPPED | OUTPATIENT
Start: 2020-03-07 | End: 2020-03-21

## 2020-03-07 RX ORDER — PSEUDOEPHEDRINE HCL 30 MG
100 TABLET ORAL 2 TIMES DAILY
Qty: 25 EACH | Refills: 0 | Status: SHIPPED | OUTPATIENT
Start: 2020-03-07 | End: 2020-03-20

## 2020-03-07 RX ORDER — HYDROCODONE BITARTRATE AND ACETAMINOPHEN 7.5; 325 MG/1; MG/1
TABLET ORAL
Qty: 60 TABLET | Refills: 0 | Status: SHIPPED | OUTPATIENT
Start: 2020-03-07 | End: 2022-02-21

## 2020-03-07 RX ORDER — ONDANSETRON 4 MG/1
4 TABLET, FILM COATED ORAL EVERY 6 HOURS PRN
Qty: 10 TABLET | Refills: 0 | Status: SHIPPED | OUTPATIENT
Start: 2020-03-07 | End: 2020-04-20

## 2020-03-07 RX ORDER — MELOXICAM 15 MG/1
15 TABLET ORAL DAILY
Qty: 29 TABLET | Refills: 0 | Status: SHIPPED | OUTPATIENT
Start: 2020-03-08 | End: 2020-04-06

## 2020-03-07 RX ADMIN — MUPIROCIN 1 APPLICATION: 20 OINTMENT TOPICAL at 08:30

## 2020-03-07 RX ADMIN — DOCUSATE SODIUM 100 MG: 100 CAPSULE, LIQUID FILLED ORAL at 08:29

## 2020-03-07 RX ADMIN — PANTOPRAZOLE SODIUM 40 MG: 40 TABLET, DELAYED RELEASE ORAL at 06:11

## 2020-03-07 RX ADMIN — KETOROLAC TROMETHAMINE 30 MG: 30 INJECTION, SOLUTION INTRAMUSCULAR at 08:30

## 2020-03-07 RX ADMIN — NEBIVOLOL HYDROCHLORIDE 2.5 MG: 5 TABLET ORAL at 08:29

## 2020-03-07 RX ADMIN — MELOXICAM 15 MG: 15 TABLET ORAL at 08:29

## 2020-03-07 RX ADMIN — POLYETHYLENE GLYCOL 3350 17 G: 17 POWDER, FOR SOLUTION ORAL at 08:29

## 2020-03-07 RX ADMIN — KETOROLAC TROMETHAMINE 30 MG: 30 INJECTION, SOLUTION INTRAMUSCULAR at 00:27

## 2020-03-07 RX ADMIN — ASPIRIN 325 MG: 325 TABLET, COATED ORAL at 08:29

## 2020-03-07 NOTE — PROGRESS NOTES
Continued Stay Note  [facility]     Patient Name: Yunior Anguiano  MRN: [id number]  Today's Date: 3/7/2020    Admit Date: 3/6/2020    Discharge Plan     Row Name 03/07/20 1050       Plan    Plan Comments  Home with [facility], update to Ana Leblanc/[facility] they will follow at home.........Joelle HERNÁNDEZ         Discharge Codes    No documentation.       Expected Discharge Date and Time     Expected Discharge Date Expected Discharge Time    Mar 7, 2020             Rena Castaneda RN

## 2020-03-07 NOTE — PLAN OF CARE
Problem: Patient Care Overview  Goal: Plan of Care Review  3/7/2020 1427 by Lauren Christy, IMTIAZ  Outcome: Outcome(s) achieved  Flowsheets (Taken 3/7/2020 1427)  Progress: improving  Plan of Care Reviewed With: patient  Outcome Summary: Pt tolerated treatment well this date. Ambulated 120ft w/ Rw and SBA, then completed L TKR protocol. Also completed stairs w/ SBA. L knee ROM 0-105*. Safe to d/c home w/ HH from PT standpoint.  3/7/2020 1427 by Lauren Christy, IMTIAZ  Reactivated

## 2020-03-07 NOTE — PLAN OF CARE
Problem: Patient Care Overview  Goal: Plan of Care Review  Outcome: Ongoing (interventions implemented as appropriate)  Flowsheets (Taken 3/7/2020 0257)  Progress: improving  Plan of Care Reviewed With: patient  Outcome Summary: No c/o pain this shift. Ambulated in puente with walker use and 1 assist. HV in place with KORI intact. Voiding adequately post straigth cath. CPAP used at night. Plans to d/c home today. Will continue to monitor.

## 2020-03-07 NOTE — DISCHARGE SUMMARY
Patient Name: Yunior Anguiano  Patient YOB: 1945    Date of Admission:  3/6/2020  Date of Discharge:  3/7/2020  Discharge Diagnosis: TOTAL KNEE ARTHROPLASTY  Presenting Problem/History of Present Illness: Primary osteoarthritis of knee, unspecified laterality [M17.10]  OA (osteoarthritis) of knee [M17.10]  Primary osteoarthritis of knee, unspecified laterality [M17.10]  Admitting Physician: Dr Obed Ramos  Consults:   Consults     No orders found for last 30 day(s).          DETAILS OF HOSPITAL STAY:  Patient is a 74 y.o. male was admitted to the floor following the above procedure and underwent an uncomplicated hospital stay.  Patient did well with physical therapy and was ambulating well without problems.  On the day of discharge the wound was clean, dry and intact and calf was soft and non tender and Homans sign was negative.  Patient was tolerating  without problems.  Patient will be discharged home.    Condition on Discharge:  Stable    Vital Signs  Temp:  [95.6 °F (35.3 °C)-98.3 °F (36.8 °C)] 97.2 °F (36.2 °C)  Heart Rate:  [50-55] 50  Resp:  [16] 16  BP: (100-132)/(50-60) 120/60    LABS:      Admission on 03/06/2020   Component Date Value Ref Range Status   • Creatinine 03/06/2020 0.75* 0.76 - 1.27 mg/dL Final   • eGFR Non African Amer 03/06/2020 102  >60 mL/min/1.73 Final   • Hemoglobin 03/07/2020 11.2* 13.0 - 17.7 g/dL Final   • Hematocrit 03/07/2020 33.0* 37.5 - 51.0 % Final       No results found.    Discharge Medications     Discharge Medications      New Medications      Instructions Start Date   aspirin 325 MG EC tablet  Replaces:  aspirin 81 MG chewable tablet   325 mg, Oral, Every 12 Hours Scheduled      docusate sodium 100 MG capsule   100 mg, Oral, 2 Times Daily      HYDROcodone-acetaminophen 7.5-325 MG per tablet  Commonly known as:  NORCO   1-2 po q 4-6 hr prn pain      meloxicam 15 MG tablet  Commonly known as:  MOBIC   15 mg, Oral, Daily   Start Date:  March 8, 2020      ondansetron 4 MG tablet  Commonly known as:  ZOFRAN   4 mg, Oral, Every 6 Hours PRN      pantoprazole 40 MG EC tablet  Commonly known as:  PROTONIX   40 mg, Oral, Daily      polyethylene glycol pack packet  Commonly known as:  MIRALAX   17 g, Oral, 2 Times Daily         Continue These Medications      Instructions Start Date   BYSTOLIC 2.5 MG tablet  Generic drug:  nebivolol   2.5 mg, Oral, Daily      fluticasone 50 MCG/ACT nasal spray  Commonly known as:  FLONASE   2 sprays, Nasal, As Needed      gabapentin 100 MG capsule  Commonly known as:  NEURONTIN   100 mg, Oral, As Needed      ZETIA 10 MG tablet  Generic drug:  ezetimibe   10 mg, Oral, Nightly         Stop These Medications    aspirin 81 MG chewable tablet  Replaced by:  aspirin 325 MG EC tablet     Chlorhexidine Gluconate 2 % pads     mupirocin 2 % ointment  Commonly known as:  BACTROBAN     Vitamin D3 50 MCG (2000 UT) capsule            Activity at Discharge:     Discharge Instructions: Patient is to continue with physical therapy exercises daily and continue working with the physical therapist as ordered. Patient may weight bear as tolerated. Apply ice regularly. Patient may ice for long periods of time as long as ice is not directly on the skin. Patient instructed on frequent calf pumping exercises.  Patient also instructed on incentive spirometer during hospitalization and encouraged to continue to use at home regularly.    Dressing: The dressing is designed to be left in place until you return to the office in 2 weeks.  The suction unit should stop functioning at 7 days and the green light will switch to yellow.  At that point the suction unit and tubing can be disconnected at the port closest to the dressing.  The suction unit and tubing may be discarded.  You may shower immediately upon return home, you will need to turn the pump off by depressing the orange button once and then you may disconnect the pump and tubing at the connection port.  After  showering, shake off the excess water and reattach the tubing.  Restart the pump by depressing the orange button one time and you will notice the green light flashing again.  If the dressing becomes disloged or saturated it should be changed. Please refer to the KORI information sheet if you have any questions about the dressing.  If you have a home health nurse or therapist they can be contacted to assist with dressing change or repair. You may also call the KORI dressing hotline for questions related to the dressing ([phone]). If there still other problems or questions related to the dressing despite these measures then you can contact Niecy at our office [phone].  If for some reason the KORI dressing is removed, after 7 days the wound can be gently cleaned with antibacterial soap then allowed to dry and covered with a dry sterile dressing. The wound should be covered at all times except while showering.  Patient may change dressings daily and prn using sterile 4x4 and paper tape, and should call if any unusual drainage, redness or swelling.*  Follow up appointment in 2 weeks - patient to call the office at [phone] to schedule.  Patient will be discharged on aspirin 325mg BID x 2weeks, then daily x 4weeks    Discharge Diagnosis:    Patient Active Problem List   Diagnosis   • Primary osteoarthritis of knee   • OA (osteoarthritis) of knee       Follow-up Appointments  Future Appointments   Date Time Provider Department Center   3/20/2020  2:10 PM Obed Ramos MD [facility] None   9/17/2020  9:40 AM Obed Ramos MD [facility] None     Additional Instructions for the Follow-ups that You Need to Schedule     Referral to home health   As directed      PT to see for Home PT protocol. Daily for first week then 3x/ wk for second week. Staples to be removed at f/u appointment in 2 weeks.    Order Comments:  PT to see for Home PT protocol. Daily for first week then 3x/ wk for second week. Staples to be  removed at f/u appointment in 2 weeks.     Face to Face Visit Date:  3/7/2020    Follow-up provider for Plan of Care?:  I will be treating the patient on an ongoing basis.  Please send me the Plan of Care for signature.    Follow-up provider:  TRACEY RAMOS [[id number]]    Reason/Clinical Findings:  post op knee replacement    Describe mobility limitations that make leaving home difficult:  pain, swelling, weakness, limited mobility, difficulty ambulating without assistive device    Nursing/Therapeutic Services Requested:  Physicial Therapy                  Tracey Ramos MD  03/07/20  9:53 AM

## 2020-03-07 NOTE — THERAPY TREATMENT NOTE
Patient Name: Yunior Anguiano  : 1945    MRN: [id number]                              Today's Date: 3/7/2020       Admit Date: 3/6/2020    Visit Dx:     ICD-10-CM ICD-9-CM   1. Primary osteoarthritis of left knee M17.12 715.16   2. Primary osteoarthritis of knee, unspecified laterality M17.10 715.16     Patient Active Problem List   Diagnosis   • Primary osteoarthritis of knee   • OA (osteoarthritis) of knee     Past Medical History:   Diagnosis Date   • Arthritis     LEFT KNEE-GETS STIFF AFTER SITTING AND UNSTABLE GETTING UP FROM SITTING POSITION   • Cardiac disease    • Cardiac murmur    • History of chest pain    • History of fractured pelvis    • History of pancreatitis    • Hypertension    • Sleep apnea      Past Surgical History:   Procedure Laterality Date   • CARPAL TUNNEL RELEASE Right 2018   • CATARACT EXTRACTION, BILATERAL     • CHOLECYSTECTOMY     • CORONARY STENT PLACEMENT      mid LAD   • FOOT SURGERY Left     bone growth removal   • HEMORRHOIDECTOMY     • JOINT REPLACEMENT Right     knee   • KNEE SURGERY Right     ACL repair   • RETINAL DETACHMENT REPAIR Right      General Information     Row Name 20 1422          PT Evaluation Time/Intention    Document Type  therapy note (daily note)  -     Mode of Treatment  physical therapy;group therapy  -     Row Name 20 142          General Information    Existing Precautions/Restrictions  fall  -     Row Name 20 142          Cognitive Assessment/Intervention- PT/OT    Orientation Status (Cognition)  oriented x 4  -       User Key  (r) = Recorded By, (t) = Taken By, (c) = Cosigned By    Initials Name Provider Type     Lauren Christy PTA Physical Therapy Assistant        Mobility     Row Name 20 142          Bed Mobility Assessment/Treatment    Comment (Bed Mobility)  up in chair  -     Row Name 20 142          Sit-Stand Transfer    Sit-Stand [place] (Transfers)  stand by  assist  -SM     Assistive Device (Sit-Stand Transfers)  walker, front-wheeled  -SM     Row Name 03/07/20 1422          Gait/Stairs Assessment/Training    [place] Level (Gait)  stand by assist  -     Assistive Device (Gait)  walker, front-wheeled  -     Distance in Feet (Gait)  120  -SM     Pattern (Gait)  step-through  -SM     Deviations/Abnormal Patterns (Gait)  justina decreased;stride length decreased  -SM     Bilateral Gait Deviations  forward flexed posture  -SM     Handrail Location (Stairs)  both sides  -SM     Number of Steps (Stairs)  4  -SM     Ascending Technique (Stairs)  step-to-step  -SM     Descending Technique (Stairs)  step-to-step  -SM       User Key  (r) = Recorded By, (t) = Taken By, (c) = Cosigned By    Initials Name Provider Type    Lauren Mitchell PTA Physical Therapy Assistant        Obj/Interventions     Row Name 03/07/20 1423          General ROM    GENERAL ROM COMMENTS  L knee 0-105  -[facility] Name 03/07/20 1423          Therapeutic Exercise    Comment (Therapeutic Exercise)  L TKR protocol x20 reps  -       User Key  (r) = Recorded By, (t) = Taken By, (c) = Cosigned By    Initials Name Provider Type    Lauren Mitchell PTA Physical Therapy Assistant        Goals/Plan    No documentation.       Clinical Impression     Row Name 03/07/20 1426          Pain Assessment    Additional Documentation  Pain Scale: Numbers Pre/Post-Treatment (Group)  -[facility] Name 03/07/20 1426          Pain Scale: Numbers Pre/Post-Treatment    Pain Scale: Numbers, Pretreatment  0/10 - no pain  -     Pain Scale: Numbers, Post-Treatment  1/10  -SM     Pain Location - Side  Left  -SM     Pain Location  knee  -SM     Pain Intervention(s)  Repositioned;Ambulation/increased activity;Rest  -     Row Name 03/07/20 1426          Positioning and Restraints    Pre-Treatment Position  sitting in chair/recliner  -SM     Post Treatment Position  chair  -SM     In Chair  reclined;call light  within reach;encouraged to call for assist;exit alarm on;with family/caregiver  -       User Key  (r) = Recorded By, (t) = Taken By, (c) = Cosigned By    Initials Name Provider Type    Lauren Mitchell PTA Physical Therapy Assistant        Outcome Measures     Row Name 03/07/20 1427          How much help from another person do you currently need...    Turning from your back to your side while in flat bed without using bedrails?  4  -SM     Moving from lying on back to sitting on the side of a flat bed without bedrails?  4  -SM     Moving to and from a bed to a chair (including a wheelchair)?  4  -SM     Standing up from a chair using your arms (e.g., wheelchair, bedside chair)?  4  -SM     Climbing 3-5 steps with a railing?  3  -SM     To walk in hospital room?  3  -SM     AM-PAC 6 Clicks Score (PT)  22  -     Row Name 03/07/20 1427          Functional Assessment    Outcome Measure Options  AM-PAC 6 Clicks Basic Mobility (PT)  -       User Key  (r) = Recorded By, (t) = Taken By, (c) = Cosigned By    Initials Name Provider Type    Lauren Mitchell PTA Physical Therapy Assistant        Physical Therapy Education                 Title: PT OT SLP Therapies (Resolved)     Topic: Physical Therapy (Resolved)     Point: Mobility training (Resolved)     Description:   Instruct learner(s) on safety and technique for assisting patient out of bed, chair or wheelchair.  Instruct in the proper use of assistive devices, such as walker, crutches, cane or brace.              Patient Friendly Description:   It's important to get you on your feet again, but we need to do so in a way that is safe for you. Falling has serious consequences, and your personal safety is the most important thing of all.        When it's time to get out of bed, one of us or a family member will sit next to you on the bed to give you support.     If your doctor or nurse tells you to use a walker, crutches, a cane, or a brace, be sure you  use it every time you get out of bed, even if you think you don't need it.    Learning Progress Summary           Patient Acceptance, E,TB,D, VU by  at 3/7/2020 1427    Acceptance, E,TB, VU,DU by NL at 3/7/2020 0928    Acceptance, E,TB, VU by  at 3/6/2020 1419                   Point: Home exercise program (Resolved)     Description:   Instruct learner(s) on appropriate technique for monitoring, assisting and/or progressing patient with therapeutic exercises and activities.              Learning Progress Summary           Patient Acceptance, E,TB,D, VU by  at 3/7/2020 1427    Acceptance, E,TB, VU,DU by NL at 3/7/2020 0928    Acceptance, E,TB, VU by  at 3/6/2020 1419                   Point: Body mechanics (Resolved)     Description:   Instruct learner(s) on proper positioning and spine alignment for patient and/or caregiver during mobility tasks and/or exercises.              Learning Progress Summary           Patient Acceptance, E,TB,D, VU by  at 3/7/2020 1427    Acceptance, E,TB, VU,DU by NL at 3/7/2020 0928    Acceptance, E,TB, VU by  at 3/6/2020 1419                   Point: Precautions (Resolved)     Description:   Instruct learner(s) on prescribed precautions during mobility and gait tasks              Learning Progress Summary           Patient Acceptance, E,TB,D, VU by  at 3/7/2020 1427    Acceptance, E,TB, VU,DU by NL at 3/7/2020 0928    Acceptance, E,TB, VU by  at 3/6/2020 1419                               User Key     Initials Effective Dates Name Provider Type Discipline     02/05/19 -  Irma Gamez, PT Physical Therapist PT    NL 06/16/16 -  Vanesa Buckley RN Registered Nurse Nurse     03/07/18 -  Lauren Christy, IMTIAZ Physical Therapy Assistant PT              PT Recommendation and Plan     Outcome Summary/Treatment Plan (PT)  Anticipated Discharge Disposition (PT): home with home health  Plan of Care Reviewed With: patient  Progress: improving  Outcome Summary: Pt  tolerated treatment well this date. Ambulated 120ft w/ Rw and SBA, then completed L TKR protocol. Also completed stairs w/ SBA. L knee ROM 0-105*. Safe to d/c home w/ HH from PT standpoint.     Time Calculation:   PT Charges     Row Name 03/07/20 1429             Time Calculation    Start Time  0932  -      Stop Time  1030  -SM      Time Calculation (min)  58 min  -SM      PT Received On  03/07/20  -        User Key  (r) = Recorded By, (t) = Taken By, (c) = Cosigned By    Initials Name Provider Type    Lauren Mitchell PTA Physical Therapy Assistant        Therapy Charges for Today     Code Description Service Date Service Provider Modifiers Qty    18922522384 HC PT THER PROC EA 15 MIN 3/7/2020 Lauren Christy PTA GP 1    58012536322 HC PT THER PROC GROUP 3/7/2020 Lauren Christy PTA GP 1          PT G-Codes  Outcome Measure Options: AM-PAC 6 Clicks Basic Mobility (PT)  AM-PAC 6 Clicks Score (PT): 22    Lauren Christy PTA  3/7/2020

## 2020-03-13 ENCOUNTER — TRANSCRIBE ORDERS (OUTPATIENT)
Dept: PHYSICAL THERAPY | Facility: CLINIC | Age: 75
End: 2020-03-13

## 2020-03-13 DIAGNOSIS — Z96.652 STATUS POST LEFT KNEE REPLACEMENT: Primary | ICD-10-CM

## 2020-04-20 ENCOUNTER — HOSPITAL ENCOUNTER (EMERGENCY)
Facility: HOSPITAL | Age: 75
Discharge: HOME | End: 2020-04-20
Attending: EMERGENCY MEDICINE | Admitting: EMERGENCY MEDICINE

## 2020-04-20 ENCOUNTER — APPOINTMENT (OUTPATIENT)
Dept: CT IMAGING | Facility: HOSPITAL | Age: 75
End: 2020-04-20

## 2020-04-20 VITALS
TEMPERATURE: 97 F | OXYGEN SATURATION: 99 % | DIASTOLIC BLOOD PRESSURE: 74 MMHG | HEART RATE: 59 BPM | WEIGHT: 180 LBS | SYSTOLIC BLOOD PRESSURE: 145 MMHG | BODY MASS INDEX: 26.66 KG/M2 | HEIGHT: 69 IN | RESPIRATION RATE: 16 BRPM

## 2020-04-20 DIAGNOSIS — R11.2 NON-INTRACTABLE VOMITING WITH NAUSEA, UNSPECIFIED VOMITING TYPE: ICD-10-CM

## 2020-04-20 DIAGNOSIS — R16.1 SPLENOMEGALY: ICD-10-CM

## 2020-04-20 DIAGNOSIS — R10.31 RLQ ABDOMINAL PAIN: Primary | ICD-10-CM

## 2020-04-20 DIAGNOSIS — R31.21 ASYMPTOMATIC MICROSCOPIC HEMATURIA: ICD-10-CM

## 2020-04-20 LAB
ALBUMIN SERPL-MCNC: 4.6 G/DL (ref 3.5–5.2)
ALBUMIN/GLOB SERPL: 2 G/DL
ALP SERPL-CCNC: 82 U/L (ref 39–117)
ALT SERPL W P-5'-P-CCNC: 19 U/L (ref 1–41)
ANION GAP SERPL CALCULATED.3IONS-SCNC: 11.1 MMOL/L (ref 5–15)
APTT PPP: 32.6 SECONDS (ref 22.7–35.4)
AST SERPL-CCNC: 22 U/L (ref 1–40)
BACTERIA UR QL AUTO: ABNORMAL /HPF
BASOPHILS # BLD AUTO: 0.01 10*3/MM3 (ref 0–0.2)
BASOPHILS NFR BLD AUTO: 0.1 % (ref 0–1.5)
BILIRUB SERPL-MCNC: 0.6 MG/DL (ref 0.2–1.2)
BILIRUB UR QL STRIP: NEGATIVE
BUN BLD-MCNC: 21 MG/DL (ref 8–23)
BUN/CREAT SERPL: 23.1 (ref 7–25)
CALCIUM SPEC-SCNC: 9.3 MG/DL (ref 8.6–10.5)
CHLORIDE SERPL-SCNC: 104 MMOL/L (ref 98–107)
CLARITY UR: CLEAR
CO2 SERPL-SCNC: 27.9 MMOL/L (ref 22–29)
COLOR UR: ABNORMAL
CREAT BLD-MCNC: 0.91 MG/DL (ref 0.76–1.27)
DEPRECATED RDW RBC AUTO: 41.9 FL (ref 37–54)
EOSINOPHIL # BLD AUTO: 0.11 10*3/MM3 (ref 0–0.4)
EOSINOPHIL NFR BLD AUTO: 1.6 % (ref 0.3–6.2)
ERYTHROCYTE [DISTWIDTH] IN BLOOD BY AUTOMATED COUNT: 12.5 % (ref 12.3–15.4)
GFR SERPL CREATININE-BSD FRML MDRD: 82 ML/MIN/1.73
GLOBULIN UR ELPH-MCNC: 2.3 GM/DL
GLUCOSE BLD-MCNC: 149 MG/DL (ref 65–99)
GLUCOSE UR STRIP-MCNC: NEGATIVE MG/DL
HCT VFR BLD AUTO: 39.1 % (ref 37.5–51)
HGB BLD-MCNC: 13 G/DL (ref 13–17.7)
HGB UR QL STRIP.AUTO: ABNORMAL
HYALINE CASTS UR QL AUTO: ABNORMAL /LPF
IMM GRANULOCYTES # BLD AUTO: 0.01 10*3/MM3 (ref 0–0.05)
IMM GRANULOCYTES NFR BLD AUTO: 0.1 % (ref 0–0.5)
INR PPP: 0.97 (ref 0.9–1.1)
KETONES UR QL STRIP: ABNORMAL
LEUKOCYTE ESTERASE UR QL STRIP.AUTO: NEGATIVE
LIPASE SERPL-CCNC: 57 U/L (ref 13–60)
LYMPHOCYTES # BLD AUTO: 0.83 10*3/MM3 (ref 0.7–3.1)
LYMPHOCYTES NFR BLD AUTO: 12.3 % (ref 19.6–45.3)
MCH RBC QN AUTO: 30.6 PG (ref 26.6–33)
MCHC RBC AUTO-ENTMCNC: 33.2 G/DL (ref 31.5–35.7)
MCV RBC AUTO: 92 FL (ref 79–97)
MONOCYTES # BLD AUTO: 0.3 10*3/MM3 (ref 0.1–0.9)
MONOCYTES NFR BLD AUTO: 4.4 % (ref 5–12)
NEUTROPHILS # BLD AUTO: 5.5 10*3/MM3 (ref 1.7–7)
NEUTROPHILS NFR BLD AUTO: 81.5 % (ref 42.7–76)
NITRITE UR QL STRIP: NEGATIVE
NRBC BLD AUTO-RTO: 0 /100 WBC (ref 0–0.2)
PH UR STRIP.AUTO: <=5 [PH] (ref 5–8)
PLATELET # BLD AUTO: 149 10*3/MM3 (ref 140–450)
PMV BLD AUTO: 10 FL (ref 6–12)
POTASSIUM BLD-SCNC: 4 MMOL/L (ref 3.5–5.2)
PROT SERPL-MCNC: 6.9 G/DL (ref 6–8.5)
PROT UR QL STRIP: NEGATIVE
PROTHROMBIN TIME: 12.6 SECONDS (ref 11.7–14.2)
RBC # BLD AUTO: 4.25 10*6/MM3 (ref 4.14–5.8)
RBC # UR: ABNORMAL /HPF
REF LAB TEST METHOD: ABNORMAL
SODIUM BLD-SCNC: 143 MMOL/L (ref 136–145)
SP GR UR STRIP: 1.02 (ref 1–1.03)
SQUAMOUS #/AREA URNS HPF: ABNORMAL /HPF
UROBILINOGEN UR QL STRIP: ABNORMAL
WBC NRBC COR # BLD: 6.76 10*3/MM3 (ref 3.4–10.8)
WBC UR QL AUTO: ABNORMAL /HPF

## 2020-04-20 PROCEDURE — 25010000002 ONDANSETRON PER 1 MG: Performed by: EMERGENCY MEDICINE

## 2020-04-20 PROCEDURE — 85730 THROMBOPLASTIN TIME PARTIAL: CPT | Performed by: EMERGENCY MEDICINE

## 2020-04-20 PROCEDURE — 96361 HYDRATE IV INFUSION ADD-ON: CPT

## 2020-04-20 PROCEDURE — 83690 ASSAY OF LIPASE: CPT | Performed by: EMERGENCY MEDICINE

## 2020-04-20 PROCEDURE — 99284 EMERGENCY DEPT VISIT MOD MDM: CPT

## 2020-04-20 PROCEDURE — 25010000002 HYDROMORPHONE PER 4 MG: Performed by: EMERGENCY MEDICINE

## 2020-04-20 PROCEDURE — 81001 URINALYSIS AUTO W/SCOPE: CPT | Performed by: EMERGENCY MEDICINE

## 2020-04-20 PROCEDURE — 96374 THER/PROPH/DIAG INJ IV PUSH: CPT

## 2020-04-20 PROCEDURE — 25010000002 IOPAMIDOL 61 % SOLUTION: Performed by: EMERGENCY MEDICINE

## 2020-04-20 PROCEDURE — 74177 CT ABD & PELVIS W/CONTRAST: CPT

## 2020-04-20 PROCEDURE — 85025 COMPLETE CBC W/AUTO DIFF WBC: CPT | Performed by: EMERGENCY MEDICINE

## 2020-04-20 PROCEDURE — 96375 TX/PRO/DX INJ NEW DRUG ADDON: CPT

## 2020-04-20 PROCEDURE — 80053 COMPREHEN METABOLIC PANEL: CPT | Performed by: EMERGENCY MEDICINE

## 2020-04-20 PROCEDURE — 85610 PROTHROMBIN TIME: CPT | Performed by: EMERGENCY MEDICINE

## 2020-04-20 RX ORDER — ONDANSETRON 2 MG/ML
8 INJECTION INTRAMUSCULAR; INTRAVENOUS ONCE AS NEEDED
Status: COMPLETED | OUTPATIENT
Start: 2020-04-20 | End: 2020-04-20

## 2020-04-20 RX ORDER — HYDROMORPHONE HYDROCHLORIDE 1 MG/ML
0.5 INJECTION, SOLUTION INTRAMUSCULAR; INTRAVENOUS; SUBCUTANEOUS ONCE
Status: COMPLETED | OUTPATIENT
Start: 2020-04-20 | End: 2020-04-20

## 2020-04-20 RX ORDER — MORPHINE SULFATE 2 MG/ML
4 INJECTION, SOLUTION INTRAMUSCULAR; INTRAVENOUS ONCE AS NEEDED
Status: DISCONTINUED | OUTPATIENT
Start: 2020-04-20 | End: 2020-04-20

## 2020-04-20 RX ORDER — ONDANSETRON 4 MG/1
4 TABLET, ORALLY DISINTEGRATING ORAL EVERY 8 HOURS PRN
Qty: 15 TABLET | Refills: 0 | Status: SHIPPED | OUTPATIENT
Start: 2020-04-20 | End: 2022-02-21

## 2020-04-20 RX ADMIN — IOPAMIDOL 85 ML: 612 INJECTION, SOLUTION INTRAVENOUS at 12:32

## 2020-04-20 RX ADMIN — SODIUM CHLORIDE 500 ML: 9 INJECTION, SOLUTION INTRAVENOUS at 11:21

## 2020-04-20 RX ADMIN — ONDANSETRON 8 MG: 2 INJECTION INTRAMUSCULAR; INTRAVENOUS at 11:21

## 2020-04-20 RX ADMIN — HYDROMORPHONE HYDROCHLORIDE 0.5 MG: 1 INJECTION, SOLUTION INTRAMUSCULAR; INTRAVENOUS; SUBCUTANEOUS at 11:21

## 2020-04-20 NOTE — ED PROVIDER NOTES
EMERGENCY DEPARTMENT ENCOUNTER    Room Number:  39/39  Date of encounter:  4/20/2020  PCP: Miguelina Quiroga MD    HPI:  Context: Yunior Anguiano is a 74 y.o. male who presents to the ED c/o chief complaint of right lower quadrant pain.  Patient reports he was normal when he woke up this morning at 730 he had breakfast without difficulty and had a normal bowel movement.  Patient states he developed right lower quadrant pain 2 hours ago that has been sharp and constant.  Patient denies any aggravating or ameliorating factors, no radiation of pain.  Patient has felt nauseous, no emesis.  Patient denies dysuria, hematuria, no increase in urinary frequency or urgency.  Patient has had no fever, some chills, no shakes or night sweats.  Patient states prior to onset of pain, he was at baseline without complaint.  Patient denies any recent fevers, cough, shortness of breath, lost of taste or smell.  Patient denies any recent travel.  No exposure to any known or suspected CoVID-19 infections.      MEDICAL HISTORY REVIEW  Reviewed in EPIC    PAST MEDICAL HISTORY  Active Ambulatory Problems     Diagnosis Date Noted   • Primary osteoarthritis of knee 12/11/2019   • OA (osteoarthritis) of knee 03/06/2020     Resolved Ambulatory Problems     Diagnosis Date Noted   • No Resolved Ambulatory Problems     Past Medical History:   Diagnosis Date   • Arthritis    • Cardiac disease    • Cardiac murmur    • History of chest pain    • History of fractured pelvis    • History of pancreatitis    • Hypertension    • Sleep apnea        PAST SURGICAL HISTORY  Past Surgical History:   Procedure Laterality Date   • CARPAL TUNNEL RELEASE Right 2018   • CATARACT EXTRACTION, BILATERAL     • CHOLECYSTECTOMY  2014   • CORONARY STENT PLACEMENT  2015    mid LAD   • FOOT SURGERY Left 1992    bone growth removal   • HEMORRHOIDECTOMY     • JOINT REPLACEMENT Right 2003    knee   • KNEE SURGERY Right 1968    ACL repair   • RETINAL DETACHMENT REPAIR Right     • TOTAL KNEE ARTHROPLASTY Left 3/6/2020    Procedure: TOTAL KNEE ARTHROPLASTY;  Surgeon: Obed Ramos MD;  Location: [facility] MAIN OR;  Service: Orthopedics;  Laterality: Left;       FAMILY HISTORY  Family History   Problem Relation Age of Onset   • Hypertension Mother    • Malig Hyperthermia Neg Hx        SOCIAL HISTORY  Social History     Socioeconomic History   • Marital status:      Spouse name: Not on file   • Number of children: Not on file   • Years of education: Not on file   • Highest education level: Not on file   Tobacco Use   • Smoking status: Never Smoker   • Smokeless tobacco: Never Used   Substance and Sexual Activity   • Alcohol use: Yes     Comment: 2-3 TIMES PER WEEK   • Drug use: No   • Sexual activity: Defer       ALLERGIES  Amlodipine besylate; Cefdinir; Losartan; Statins; Turmeric; and Erythromycin base    The patient's allergies have been reviewed    REVIEW OF SYSTEMS  All systems reviewed and negative except for those discussed in HPI.     PHYSICAL EXAM  I have reviewed the triage vital signs and nursing notes.  ED Triage Vitals [04/20/20 1055]   Temp Heart Rate Resp BP SpO2   97 °F (36.1 °C) 51 16 -- 96 %      Temp src Heart Rate Source Patient Position BP Location FiO2 (%)   Tympanic Monitor -- -- --       GENERAL: No acute distress  HENT: NCAT, PERRL, Nares patent  EYES: no scleral icterus  NECK: trachea midline, no ROM limitations  CV: regular rhythm, regular rate  RESPIRATORY: normal effort  ABDOMEN: soft, nondistended, right lower quadrant tenderness, positive McBurney's, negative Rovsing, negative psoas, negative obturator, no rebound tenderness, no guarding or rigidity  : deferred  MUSCULOSKELETAL: no deformity  NEURO: alert, moves all extremities, follows commands  SKIN: warm, dry    LAB RESULTS  Recent Results (from the past 24 hour(s))   Comprehensive Metabolic Panel    Collection Time: 04/20/20 11:18 AM   Result Value Ref Range    Glucose 149 (H) 65 - 99 mg/dL    BUN 21  8 - 23 mg/dL    Creatinine 0.91 0.76 - 1.27 mg/dL    Sodium 143 136 - 145 mmol/L    Potassium 4.0 3.5 - 5.2 mmol/L    Chloride 104 98 - 107 mmol/L    CO2 27.9 22.0 - 29.0 mmol/L    Calcium 9.3 8.6 - 10.5 mg/dL    Total Protein 6.9 6.0 - 8.5 g/dL    Albumin 4.60 3.50 - 5.20 g/dL    ALT (SGPT) 19 1 - 41 U/L    AST (SGOT) 22 1 - 40 U/L    Alkaline Phosphatase 82 39 - 117 U/L    Total Bilirubin 0.6 0.2 - 1.2 mg/dL    eGFR Non African Amer 82 >60 mL/min/1.73    Globulin 2.3 gm/dL    A/G Ratio 2.0 g/dL    BUN/Creatinine Ratio 23.1 7.0 - 25.0    Anion Gap 11.1 5.0 - 15.0 mmol/L   Lipase    Collection Time: 04/20/20 11:18 AM   Result Value Ref Range    Lipase 57 13 - 60 U/L   Protime-INR    Collection Time: 04/20/20 11:18 AM   Result Value Ref Range    Protime 12.6 11.7 - 14.2 Seconds    INR 0.97 0.90 - 1.10   aPTT    Collection Time: 04/20/20 11:18 AM   Result Value Ref Range    PTT 32.6 22.7 - 35.4 seconds   CBC Auto Differential    Collection Time: 04/20/20 11:18 AM   Result Value Ref Range    WBC 6.76 3.40 - 10.80 10*3/mm3    RBC 4.25 4.14 - 5.80 10*6/mm3    Hemoglobin 13.0 13.0 - 17.7 g/dL    Hematocrit 39.1 37.5 - 51.0 %    MCV 92.0 79.0 - 97.0 fL    MCH 30.6 26.6 - 33.0 pg    MCHC 33.2 31.5 - 35.7 g/dL    RDW 12.5 12.3 - 15.4 %    RDW-SD 41.9 37.0 - 54.0 fl    MPV 10.0 6.0 - 12.0 fL    Platelets 149 140 - 450 10*3/mm3    Neutrophil % 81.5 (H) 42.7 - 76.0 %    Lymphocyte % 12.3 (L) 19.6 - 45.3 %    Monocyte % 4.4 (L) 5.0 - 12.0 %    Eosinophil % 1.6 0.3 - 6.2 %    Basophil % 0.1 0.0 - 1.5 %    Immature Grans % 0.1 0.0 - 0.5 %    Neutrophils, Absolute 5.50 1.70 - 7.00 10*3/mm3    Lymphocytes, Absolute 0.83 0.70 - 3.10 10*3/mm3    Monocytes, Absolute 0.30 0.10 - 0.90 10*3/mm3    Eosinophils, Absolute 0.11 0.00 - 0.40 10*3/mm3    Basophils, Absolute 0.01 0.00 - 0.20 10*3/mm3    Immature Grans, Absolute 0.01 0.00 - 0.05 10*3/mm3    nRBC 0.0 0.0 - 0.2 /100 WBC   Urinalysis With Microscopic If Indicated (No Culture) -  Urine, Clean Catch    Collection Time: 04/20/20 11:44 AM   Result Value Ref Range    Color, UA Dark Yellow (A) Yellow, Straw    Appearance, UA Clear Clear    pH, UA <=5.0 5.0 - 8.0    Specific Gravity, UA 1.025 1.005 - 1.030    Glucose, UA Negative Negative    Ketones, UA Trace (A) Negative    Bilirubin, UA Negative Negative    Blood, UA Moderate (2+) (A) Negative    Protein, UA Negative Negative    Leuk Esterase, UA Negative Negative    Nitrite, UA Negative Negative    Urobilinogen, UA 0.2 E.U./dL 0.2 - 1.0 E.U./dL   Urinalysis, Microscopic Only - Urine, Clean Catch    Collection Time: 04/20/20 11:44 AM   Result Value Ref Range    RBC, UA 13-20 (A) None Seen, 0-2 /HPF    WBC, UA 0-2 None Seen, 0-2 /HPF    Bacteria, UA None Seen None Seen /HPF    Squamous Epithelial Cells, UA 0-2 None Seen, 0-2 /HPF    Hyaline Casts, UA 3-6 None Seen /LPF    Methodology Automated Microscopy        I ordered the above labs and reviewed the results.    RADIOLOGY  Ct Abdomen Pelvis With Contrast    Result Date: 4/20/2020  CT ABDOMEN AND PELVIS WITH IV CONTRAST  HISTORY: 74-year-old male with right lower quadrant pain and nausea. Cholecystectomy in the past.  TECHNIQUE: Radiation dose reduction techniques were utilized, including automated exposure control and exposure modulation based on body size. 3 mm images were obtained through the abdomen and pelvis after the administration of IV contrast. Compared with previous CT from 03/12/2014.  FINDINGS: The liver appears unremarkable and there is no biliary dilatation. The pancreas and left adrenal appear unremarkable. 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 There are several tiny and small renal cysts which appear largely unchanged. There is parenchymal scarring at the upper pole of the right kidney. The spleen measures 14.5 cm in AP diameter, previously 13 cm. There is no significant colonic diverticulosis. The appendix is diminutive. There are moderately extensive abdominal  aortic atherosclerotic changes without aneurysmal dilatation. There is a tiny stone within the nondependent aspect of the urinary bladder. There is no free fluid or lymphadenopathy. No acute bowel abnormality is seen.      1. There has been development of mild splenomegaly since the previous CT from 2014. 2. Tiny stone within the dependent aspect of the urinary bladder. There are no renal or ureteral stones and there is no hydronephrosis bilaterally.  Discussed with Dr. Mcgee.        I ordered the above noted radiological studies. I reviewed the images and results. I agree with the radiologist interpretation.    PROCEDURES  Procedures    MEDICATIONS GIVEN IN ER  Medications   sodium chloride 0.9 % bolus 500 mL (0 mL Intravenous Stopped 4/20/20 1333)   ondansetron (ZOFRAN) injection 8 mg (8 mg Intravenous Given 4/20/20 1121)   HYDROmorphone (DILAUDID) injection 0.5 mg (0.5 mg Intravenous Given 4/20/20 1121)   iopamidol (ISOVUE-300) 61 % injection 100 mL (85 mL Intravenous Given by Other 4/20/20 1232)       PROGRESS, DATA ANALYSIS, CONSULTS, AND MEDICAL DECISION MAKING  A complete history and physical exam have been performed.  All available laboratory and imaging results have been reviewed by myself prior to disposition.  Face mask and gloves were worn throughout the patient encounter, unless additional PPE was worn and specified below. Hand hygiene was performed before entering and after leaving the patient room.  [facility]    ED Course as of Apr 20 1503   Mon Apr 20, 2020   1102 Discussed pertinent information from history and physical exam with patient.  Discussed differential diagnosis.  Discussed plan for ED evaluation/work-up/treatment.  All questions answered.  Patient is agreeable with plan.        [JG]   8864 Patient reassessed.  Patient states he had one episode of emesis here, afterwards abdominal pain resolved.  Discussed work-up with patient including CT imaging results.  Patient has no abdominal pain at  present, no nausea present.  Will p.o. challenge patient.  If patient does well with p.o. challenge, plan for discharge with primary care follow-up.    [JG]   1500 Patient is tolerating oral intake without difficulty.  Repeat abdominal exam: Soft, nontender, nondistended, no rebound tenderness, no guarding or rigidity.  Patient does not have abdominal exam consistent with an acute surgical problem.  After extensive discussion return precautions, will discharge with primary care follow-up.    [JG]   1501 The patient was reexamined.  They have had symptomatic improvement during their ED stay.  I discussed today's findings with the patient, explaining the pertinent positives and negatives from today's visit, and the plan of care.  Discussed plan for discharge as there is no emergent indication for admission.  Discussed limitation of the ED work-up and that this is to rule out life-threatening emergencies but that they could require further testing as determined by their primary care and or any referred specialist patient is agreeable and understands need for follow-up and repeat exam/testing.  Patient is aware that discharge does not mean there is nothing wrong, indicates no emergency is present, and that they must continue their care with their primary care physician and/or any referred specialist.  They were given appropriate follow-up with their primary care physician and/or specialist.  I had an extensive discussion on the expected clinical course and return precautions.  Patient understands to return to the emergency department for continuation, worsening, or new symptoms.  I answered any of the patient's questions. Patient was discharged home in a stable condition.        [JG]      ED Course User Index  [JG] Roque Mcgee MD       AS OF 15:03 VITALS:    BP - 139/77  HR - 53  TEMP - 97 °F (36.1 °C) (Tympanic)  O2 SATS - 100%    DIAGNOSIS  Final diagnoses:   RLQ abdominal pain   Non-intractable vomiting with  nausea, unspecified vomiting type   Splenomegaly   Asymptomatic microscopic hematuria         DISPOSITION  DISCHARGE    Patient discharged in stable condition.    Reviewed implications of results, diagnosis, meds, responsibility to follow up, warning signs and symptoms of possible worsening, potential complications and reasons to return to ER.    Patient/Family voiced understanding of above instructions.    Discussed plan for discharge, as there is no emergent indication for admission. Patient referred to primary care provider for BP management due to today's BP. Pt/family is agreeable and understands need for follow up and repeat testing.  Pt is aware that discharge does not mean that nothing is wrong but it indicates no emergency is present that requires admission and they must continue care with follow-up as given below or physician of their choice.     FOLLOW-UP  Miguelina Quiroga MD  [address] [place], [place] [id number] [phone]    Schedule an appointment as soon as possible for a visit in 2 days  even if well         Medication List      New Prescriptions    ondansetron ODT 4 MG disintegrating tablet  Commonly known as:  ZOFRAN-ODT  Place 1 tablet on the tongue Every 8 (Eight) Hours As Needed for Nausea or   Vomiting.           Roque Mcgee MD  04/20/20 9752

## 2020-04-20 NOTE — ED TRIAGE NOTES
rlq abd pain.  Nausea.  Denies cough, fever, soa, change in taste/smell.  Mask in place on arrival

## 2020-06-08 ENCOUNTER — TELEPHONE (OUTPATIENT)
Dept: ORTHOPEDIC SURGERY | Facility: CLINIC | Age: 75
End: 2020-06-08

## 2020-06-08 RX ORDER — CLINDAMYCIN HYDROCHLORIDE 300 MG/1
CAPSULE ORAL
Qty: 2 CAPSULE | Refills: 6 | Status: SHIPPED | OUTPATIENT
Start: 2020-06-08 | End: 2021-08-09

## 2020-06-10 NOTE — TELEPHONE ENCOUNTER
Call returned to the patient.  Have left message with him that his prescription for antibiotics were called in earlier this week by Naty Ford

## 2020-09-17 ENCOUNTER — OFFICE VISIT (OUTPATIENT)
Dept: ORTHOPEDIC SURGERY | Facility: CLINIC | Age: 75
End: 2020-09-17

## 2020-09-17 VITALS — WEIGHT: 175 LBS | TEMPERATURE: 98 F | HEIGHT: 69 IN | BODY MASS INDEX: 25.92 KG/M2

## 2020-09-17 DIAGNOSIS — R52 PAIN: Primary | ICD-10-CM

## 2020-09-17 PROCEDURE — 99213 OFFICE O/P EST LOW 20 MIN: CPT | Performed by: ORTHOPAEDIC SURGERY

## 2020-09-17 PROCEDURE — 73562 X-RAY EXAM OF KNEE 3: CPT | Performed by: ORTHOPAEDIC SURGERY

## 2020-09-17 RX ORDER — ASPIRIN 81 MG/1
81 TABLET ORAL DAILY
COMMUNITY

## 2020-09-17 NOTE — PROGRESS NOTES
Patient: Yunior Anguiano  YOB: 1945 74 y.o. male  Medical Record Number: [id number]    Chief Complaints: left tka -  6 months, left ankle popping    History of Present Illness:Yunior Anguiano is a 74 y.o. male who presents for follow-up of  Left tka -  6 months - mild anterior ache. Also c/o right posterior hip pain since July -  Ache with activity.  Also complaining of a moderate ache in the right buttock does appear to be improving since it started in July.  Additionally he is having some left ankle popping with activity which tends to loosen up over time not particularly painful.    Allergies:   Allergies   Allergen Reactions   • Amlodipine Besylate Anaphylaxis and Hives   • Cefdinir Hives     rash   • Losartan Itching     Itching and it stopped with he stopped the losartan   • Statins Other (See Comments)     ACHES cannot take   Muscles get WEAK   • Turmeric Hives   • Hydrocodone Provider Review Needed   • Erythromycin Base Nausea And Vomiting     vomits       Medications:   Current Outpatient Medications   Medication Sig Dispense Refill   • aspirin 81 MG EC tablet Take 81 mg by mouth Daily.     • BYSTOLIC 2.5 MG tablet Take 2.5 mg by mouth Daily.  10   • fluticasone (FLONASE) 50 MCG/ACT nasal spray 2 sprays into the nostril(s) as directed by provider As Needed for Rhinitis.     • gabapentin (NEURONTIN) 100 MG capsule Take 100 mg by mouth As Needed (FOR RESTLESS LEGS).     • ZETIA 10 MG tablet Take 10 mg by mouth Every Night.  5   • clindamycin (Cleocin) 300 MG capsule Take to 1 hour prior to dental work 2 capsule 6   • HYDROcodone-acetaminophen (NORCO) 7.5-325 MG per tablet 1-2 po q 4-6 hr prn pain 60 tablet 0   • ondansetron ODT (ZOFRAN-ODT) 4 MG disintegrating tablet Place 1 tablet on the tongue Every 8 (Eight) Hours As Needed for Nausea or Vomiting. 15 tablet 0     No current facility-administered medications for this visit.          The following portions of the patient's history were  "reviewed and updated as appropriate: allergies, current medications, past family history, past medical history, past social history, past surgical history and problem list.    Review of Systems:   A 14 point review of systems was performed. All systems negative except pertinent positives/negative listed in HPI above    Physical Exam:   Vitals:    09/17/20 0955   Temp: 98 °F (36.7 °C)   TempSrc: Temporal   Weight: 79.4 kg (175 lb)   Height: 175.3 cm (69\")       General: A and O x 3, ASA, NAD    SCLERA:    Normal    DENTITION:   Normal  Knee:  left    ALIGNMENT:     Neutral  ,   Patella  tracks  Midline without crepitance    GAIT:    Nonantalgic    SKIN:    Well healed midline incision, no erythema or fluctuance    RANGE OF MOTION:   0  -  110   DEG    STRENGTH:   5  / 5    LIGAMENTS:    No varus / valgus instability.   No  Flexion   instability.       DISTAL PULSES:    Paplable    DISTAL SENSATION :   Intact    LYMPHATICS:     No   lymphadenopathy    OTHER:     No   Effusion      Calf soft / nontender ,   Negative Nelson's sign            Radiology:  Xrays 3views radhames knees(ap,lateral, sunrise) were ordered and reviewed for evaluation of knee pain demonstrating well-positioned total knees no wear loosening or osteolysis.  In comparison with previous films they are unchanged.      Assessment/Plan:  Left total knee 6 months out doing well, right total knee 17 years out doing well, continue with physical therapy exercises    Left ankle popping mild in nature I recommended physical therapy he like to hold off.  If he changes his mind he will call back.    Right posterior buttock pain seems to be muscular in nature could try meloxicam or therapy again he wants to hold off if it worsens he will call.  "

## 2021-03-03 DIAGNOSIS — Z23 IMMUNIZATION DUE: ICD-10-CM

## 2021-08-09 ENCOUNTER — OFFICE VISIT (OUTPATIENT)
Dept: ORTHOPEDIC SURGERY | Facility: CLINIC | Age: 76
End: 2021-08-09

## 2021-08-09 VITALS — BODY MASS INDEX: 28.65 KG/M2 | WEIGHT: 194 LBS | TEMPERATURE: 97.5 F

## 2021-08-09 DIAGNOSIS — R52 PAIN: Primary | ICD-10-CM

## 2021-08-09 PROCEDURE — 73562 X-RAY EXAM OF KNEE 3: CPT | Performed by: NURSE PRACTITIONER

## 2021-08-09 PROCEDURE — 99212 OFFICE O/P EST SF 10 MIN: CPT | Performed by: NURSE PRACTITIONER

## 2021-08-09 RX ORDER — POTASSIUM CHLORIDE 750 MG/1
10 CAPSULE, EXTENDED RELEASE ORAL DAILY
COMMUNITY
Start: 2021-07-19 | End: 2022-02-21

## 2021-08-09 RX ORDER — HYDROCHLOROTHIAZIDE 12.5 MG/1
12.5 TABLET ORAL DAILY
COMMUNITY
Start: 2021-07-07 | End: 2022-02-21

## 2021-08-09 RX ORDER — HYDRALAZINE HYDROCHLORIDE 10 MG/1
TABLET, FILM COATED ORAL
COMMUNITY
Start: 2021-08-06 | End: 2022-06-14

## 2021-08-09 NOTE — PROGRESS NOTES
Patient: Yunior Anguiano  YOB: 1945 75 y.o. male  Medical Record Number: [id number]    Chief Complaints:   Chief Complaint   Patient presents with   • Left Knee - Follow-up   • Right Knee - Follow-up       History of Present Illness:Yunior Anguiano is a 75 y.o. male who presents for follow-up of bilateral total knee replacements.  The patient has had right total knee replacement 18 years ago and has some signs of polyethylene wear.  But he is completely asymptomatic.  Denies any pain or instability.  Regarding his left knee he had that replaced about a year and a half ago and has been doing great denies any problems    Allergies:   Allergies   Allergen Reactions   • Amlodipine Besylate Anaphylaxis and Hives   • Cefdinir Hives     rash   • Losartan Itching     Itching and it stopped with he stopped the losartan   • Statins Other (See Comments)     ACHES cannot take   Muscles get WEAK   • Turmeric Hives and Rash   • Hydrocodone Provider Review Needed   • Erythromycin Base Nausea And Vomiting     vomits       Medications:   Current Outpatient Medications   Medication Sig Dispense Refill   • aspirin 81 MG EC tablet Take 81 mg by mouth Daily.     • Cholecalciferol 25 MCG (1000 UT) capsule      • fluticasone (FLONASE) 50 MCG/ACT nasal spray 2 sprays into the nostril(s) as directed by provider As Needed for Rhinitis.     • gabapentin (NEURONTIN) 100 MG capsule Take 100 mg by mouth As Needed (FOR RESTLESS LEGS).     • hydrALAZINE (APRESOLINE) 10 MG tablet      • potassium chloride (MICRO-K) 10 MEQ CR capsule Take 10 mEq by mouth Daily.     • ZETIA 10 MG tablet Take 10 mg by mouth Every Night.  5   • BYSTOLIC 2.5 MG tablet Take 2.5 mg by mouth Daily.  10   • hydroCHLOROthiazide (HYDRODIURIL) 12.5 MG tablet Take 12.5 mg by mouth Daily.     • HYDROcodone-acetaminophen (NORCO) 7.5-325 MG per tablet 1-2 po q 4-6 hr prn pain 60 tablet 0   • ondansetron ODT (ZOFRAN-ODT) 4 MG disintegrating tablet Place 1  tablet on the tongue Every 8 (Eight) Hours As Needed for Nausea or Vomiting. 15 tablet 0     No current facility-administered medications for this visit.         The following portions of the patient's history were reviewed and updated as appropriate: allergies, current medications, past family history, past medical history, past social history, past surgical history and problem list.    Review of Systems:   A 14 point review of systems was performed. All systems negative except pertinent positives/negative listed in HPI above    Physical Exam:   Vitals:    08/09/21 0813   Temp: 97.5 °F (36.4 °C)   Weight: 88 kg (194 lb)       General: A and O x 3, ASA, NAD    SCLERA:    Normal    Skin clear no unusual lesions noted  Bilateral knees patient has well-healed surgical incisions noted excellent range of motion with no instability calf is soft and nontender    Radiology:  Xrays 3views (ap,lateral, sunrise) bilateral knees were ordered and reviewed today secondary to pain and show bilateral total knee replacements he does have some signs of polyethylene wear right knee but compared to views are unchanged    Assessment/Plan: 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  He knows to call in the meantime if he starts having any pain swelling soreness or instability      Naty Ford, APRN  8/9/2021

## 2022-02-21 ENCOUNTER — OFFICE VISIT (OUTPATIENT)
Dept: ORTHOPEDIC SURGERY | Facility: CLINIC | Age: 77
End: 2022-02-21

## 2022-02-21 VITALS — HEIGHT: 69 IN | WEIGHT: 185 LBS | BODY MASS INDEX: 27.4 KG/M2 | TEMPERATURE: 98.2 F

## 2022-02-21 DIAGNOSIS — M79.672 FOOT PAIN, LEFT: Primary | ICD-10-CM

## 2022-02-21 PROCEDURE — 73630 X-RAY EXAM OF FOOT: CPT | Performed by: NURSE PRACTITIONER

## 2022-02-21 PROCEDURE — 99213 OFFICE O/P EST LOW 20 MIN: CPT | Performed by: NURSE PRACTITIONER

## 2022-02-21 NOTE — PROGRESS NOTES
Patient Name: Yunior Anguiano   YOB: 1945  Referring Primary Care Physician: Miguelina Quiroga MD  BMI: Body mass index is 27.32 kg/m².    Chief Complaint:    Chief Complaint   Patient presents with   • Left Foot - Initial Evaluation, Pain        HPI: New pt to me very active presents complaining of left foot pain. Pt is a retired psychologist.  Patient reports aching pain intermittent to the left foot on the outer side there has not been relieved by rest ice elevation history of primary osteoarthritis in the knee    Yunior Anguiano is a 76 y.o. male who presents today for evaluation of   Chief Complaint   Patient presents with   • Left Foot - Initial Evaluation, Pain       This problem is new to this examiner.     Subjective   Medications:   Home Medications:  Current Outpatient Medications on File Prior to Visit   Medication Sig   • aspirin 81 MG EC tablet Take 81 mg by mouth Daily.   • Cholecalciferol 25 MCG (1000 UT) capsule    • hydrALAZINE (APRESOLINE) 10 MG tablet    • ZETIA 10 MG tablet Take 10 mg by mouth Every Night.     No current facility-administered medications on file prior to visit.     Current Medications:  Scheduled Meds:  Continuous Infusions:No current facility-administered medications for this visit.    PRN Meds:.    I have reviewed the patient's medical history in detail and updated the computerized patient record.  Review and summarization of old records includes:    Past Medical History:   Diagnosis Date   • Arthritis     LEFT KNEE-GETS STIFF AFTER SITTING AND UNSTABLE GETTING UP FROM SITTING POSITION   • Cardiac disease    • Cardiac murmur    • CTS (carpal tunnel syndrome) 2016    Surgery 2018   • Fracture, foot 2013   • History of chest pain    • History of fractured pelvis    • History of pancreatitis    • Hypertension    • Knee swelling 1968   • Low back strain 2018    PT resolved it   • Sleep apnea    • Tear of meniscus of knee 1968        Past Surgical History:    Procedure Laterality Date   • CARPAL TUNNEL RELEASE Right 2018   • CATARACT EXTRACTION, BILATERAL     • CHOLECYSTECTOMY  2014   • CORONARY STENT PLACEMENT  2015    mid LAD   • FOOT SURGERY Left 1992    bone growth removal   • HAND SURGERY  2018    Carpal tunnel   • HEMORRHOIDECTOMY     • JOINT REPLACEMENT Right 2003    knee   • KNEE SURGERY Right 1968    ACL repair   • RETINAL DETACHMENT REPAIR Right    • TOTAL KNEE ARTHROPLASTY Left 3/6/2020    Procedure: TOTAL KNEE ARTHROPLASTY;  Surgeon: Obed Ramos MD;  Location: [facility] MAIN OR;  Service: Orthopedics;  Laterality: Left;   • TRIGGER POINT INJECTION  Fo get 2016        Social History     Occupational History   • Not on file   Tobacco Use   • Smoking status: Never Smoker   • Smokeless tobacco: Never Used   Substance and Sexual Activity   • Alcohol use: Yes     Alcohol/week: 3.0 standard drinks     Types: 3 Cans of beer per week     Comment: 2-3 TIMES PER WEEK   • Drug use: No   • Sexual activity: Not Currently      Social History     Social History Narrative   • Not on file        Family History   Problem Relation Age of Onset   • Hypertension Mother    • Malig Hyperthermia Neg Hx        ROS: 14 point review of systems was performed and all other systems were reviewed and are negative except for documented findings in HPI and today's encounter.     Allergies:   Allergies   Allergen Reactions   • Amlodipine Besylate Anaphylaxis and Hives   • Cefdinir Hives     rash   • Losartan Itching     Itching and it stopped with he stopped the losartan   • Statins Other (See Comments)     ACHES cannot take   Muscles get WEAK   • Turmeric Hives and Rash   • Hydrocodone Provider Review Needed   • Erythromycin Base Nausea And Vomiting     vomits     Constitutional:  Denies fever, shaking or chills   Eyes:  Denies change in visual acuity   HENT:  Denies nasal congestion or sore throat   Respiratory:  Denies cough or shortness of breath   Cardiovascular:  Denies chest pain or  "severe LE edema   GI:  Denies abdominal pain, nausea, vomiting, bloody stools or diarrhea   Musculoskeletal:  Numbness, tingling, pain, or loss of motor function only as noted above in history of present illness.  : Denies painful urination or hematuria  Integument:  Denies rash, lesion or ulceration   Neurologic:  Denies headache or focal weakness  Endocrine:  Denies lymphadenopathy  Psych:  Denies confusion or change in mental status   Hem:  Denies active bleeding    OBJECTIVE:  Physical Exam:   Temp 98.2 °F (36.8 °C)   Ht 175.3 cm (69\")   Wt 83.9 kg (185 lb)   BMI 27.32 kg/m²     General Appearance:    Alert, cooperative, in no acute distress                  Eyes: conjunctiva clear  ENT: external ears and nose atraumatic  CV: no peripheral edema  Resp: normal respiratory effort  Skin: no rashes or wounds; normal turgor  Psych: mood and affect appropriate  Lymph: no nodes appreciated  Neuro: gross sensation intact  Vascular:  Palpable peripheral pulse in noted extremity  Musculoskeletal Extremities: Skin is warm dry intact with good pulses and sensation left foot has point tenderness to the fifth metatarsal mid aspect the base is nontender the ankles nontender calf is soft and nontender Achilles is intact good capillary refill    Radiology:   Left foot 3 views done for pain with no comparison films no acute fracture    Assessment:     ICD-10-CM ICD-9-CM   1. Foot pain, left  M79.672 729.5        Procedures     Cannot assess stress fractures with plain film at times he has point tenderness and will assess with MRI and have follow-up with either [facility] or sports medicine placed in a short fracture walker boot  Plan: The diagnosis(es), natural history, pathophysiology and treatment for diagnosis(es) were discussed. Opportunity given and questions answered.  Biomechanics of pertinent body areas discussed.  When appropriate, the use of ambulatory aids discussed.  EXERCISES:  Advice on benefits of, and types of " regular/moderate exercise pertaining to orthopedic diagnosis(es).  MEDICATIONS:  The risks, benefits, warnings,side effects and alternatives of medications discussed.  Inflammation/pain control; with cold, heat, elevation and/or liniments discussed as appropriate  SPECIALTY REFERRAL  MRI.  MEDICAL RECORDS reviewed from other provider(s) for past and current medical history pertinent to this complaint.      2/24/2022    Much of this encounter note is an electronic transcription/translation of spoken language to printed text. The electronic translation of spoken language may permit erroneous, or at times, nonsensical words or phrases to be inadvertently transcribed; Although I have reviewed the note for such errors, some may still exist  Answers for HPI/ROS submitted by the patient on 2/15/2022  What is the primary reason for your visit?: Lower Extremity Injury  Incident occurred: more than 1 week ago  Incident location: other  Injury mechanism: unknown  Pain location: left foot  Pain quality: aching  Pain - numeric: 4/10  Pain course: intermittent  tingling: No  inability to bear weight: No  loss of motion: No  loss of sensation: No  muscle weakness: No  Foreign body present: no foreign bodies  Aggravated by: movement

## 2022-04-11 ENCOUNTER — OFFICE VISIT (OUTPATIENT)
Dept: ORTHOPEDIC SURGERY | Facility: CLINIC | Age: 77
End: 2022-04-11

## 2022-04-11 DIAGNOSIS — R52 PAIN: Primary | ICD-10-CM

## 2022-04-11 PROCEDURE — 73562 X-RAY EXAM OF KNEE 3: CPT | Performed by: NURSE PRACTITIONER

## 2022-04-11 PROCEDURE — 99212 OFFICE O/P EST SF 10 MIN: CPT | Performed by: NURSE PRACTITIONER

## 2022-04-11 NOTE — PROGRESS NOTES
Patient: Yunior Anguiano  YOB: 1945 76 y.o. male  Medical Record Number: [id number]    Chief Complaints: No chief complaint on file.      History of Present Illness:Yunior Anguiano is a 76 y.o. male who presents for follow-up of bilateral total knee replacements.  He is 19 years out from right total knee replacement and over 2 years for his left.  Doing great, denies any unusual instability pain or soreness.    Allergies:   Allergies   Allergen Reactions   • Amlodipine Besylate Anaphylaxis and Hives   • Cefdinir Hives     rash   • Losartan Itching     Itching and it stopped with he stopped the losartan   • Statins Other (See Comments)     ACHES cannot take   Muscles get WEAK   • Turmeric Hives and Rash   • Hydrocodone Provider Review Needed   • Erythromycin Base Nausea And Vomiting     vomits       Medications:   Current Outpatient Medications   Medication Sig Dispense Refill   • aspirin 81 MG EC tablet Take 81 mg by mouth Daily.     • Cholecalciferol 25 MCG (1000 UT) capsule      • hydrALAZINE (APRESOLINE) 10 MG tablet      • ZETIA 10 MG tablet Take 10 mg by mouth Every Night.  5     No current facility-administered medications for this visit.         The following portions of the patient's history were reviewed and updated as appropriate: allergies, current medications, past family history, past medical history, past social history, past surgical history and problem list.    Review of Systems:   A 14 point review of systems was performed. All systems negative except pertinent positives/negative listed in HPI above    Physical Exam:   Temperature is afebrile respirations 20    General: A and O x 3, ASA, NAD    SCLERA:    Normal    Skin clear no unusual lesions noted  Bilateral knees patient has well-healed surgical incisions noted excellent range of motion with no instability calf soft and nontender       Radiology:  Xrays 3views (ap,lateral, sunrise) right knee were ordered and reviewed today  secondary to previous surgery and occasional soreness and show well-placed well-positioned bilateral total knee replacements with some signs of polyethylene wear right knee.  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  Would recommend continued regimen and will follow up in 1 year but will call in the meantime if he develops any symptoms associated with polyethylene wear.  Patient verbalized understanding      Naty Ford, APRN  4/11/2022

## 2022-06-14 ENCOUNTER — OFFICE VISIT (OUTPATIENT)
Dept: ORTHOPEDIC SURGERY | Facility: CLINIC | Age: 77
End: 2022-06-14

## 2022-06-14 VITALS — TEMPERATURE: 96.5 F | HEIGHT: 69 IN | BODY MASS INDEX: 28.58 KG/M2 | WEIGHT: 193 LBS

## 2022-06-14 DIAGNOSIS — M16.11 PRIMARY OSTEOARTHRITIS OF RIGHT HIP: Primary | ICD-10-CM

## 2022-06-14 PROCEDURE — 99213 OFFICE O/P EST LOW 20 MIN: CPT | Performed by: ORTHOPAEDIC SURGERY

## 2022-06-14 RX ORDER — HYDRALAZINE HYDROCHLORIDE 10 MG/1
10 TABLET, FILM COATED ORAL 3 TIMES DAILY
COMMUNITY
Start: 2022-05-17 | End: 2022-08-15

## 2022-06-14 NOTE — PROGRESS NOTES
Patient: Yunior Anguiano  YOB: 1945 76 y.o. male  Medical Record Number: [id number]    Chief Complaints:   Chief Complaint   Patient presents with   • Right Hip - Pain     New Problem         History of Present Illness:Yunior Anguiano is a 76 y.o. male who presents with complaints of right hip region tightness.  He does not have pain so much but he does have some tightness and pressure within the right anterior hip region/right lower abdominal region with pain noticed about 3 months ago.  He denies any history of trauma or change in activity level.  He saw his primary care doctor who performed an abdominal CT scan and an evaluation and according to the patient they did not feel that it is consistent with any intra-abdominal pathology.  Was sent here for now evaluation of the hip.    Allergies:   Allergies   Allergen Reactions   • Amlodipine Besylate Anaphylaxis and Hives   • Cefdinir Hives     rash   • Losartan Itching     Itching and it stopped with he stopped the losartan   • Statins Other (See Comments)     ACHES cannot take   Muscles get WEAK   • Turmeric Hives and Rash   • Hydrocodone Provider Review Needed   • Erythromycin Base Nausea And Vomiting     vomits       Medications:   Current Outpatient Medications   Medication Sig Dispense Refill   • aspirin 81 MG EC tablet Take 81 mg by mouth Daily.     • Cholecalciferol 25 MCG (1000 UT) capsule      • hydrALAZINE (APRESOLINE) 10 MG tablet Take 10 mg by mouth 3 (Three) Times a Day.     • ZETIA 10 MG tablet Take 10 mg by mouth Every Night.  5     No current facility-administered medications for this visit.         The following portions of the patient's history were reviewed and updated as appropriate: allergies, current medications, past family history, past medical history, past social history, past surgical history and problem list.    Review of Systems:   A 14 point review of systems was performed. All systems negative except pertinent  "positives/negative listed in HPI above    Physical Exam:   Vitals:    06/14/22 1043   Temp: 96.5 °F (35.8 °C)   Weight: 87.5 kg (193 lb)   Height: 175.3 cm (69\")       General: A and O x 3, ASA, NAD    SCLERA:    Normal    DENTITION:   Normal   Hip:  right    LEG ALIGNMENT:     Neutral        LEG LENGTH DISCREPANCY   :    none    GAIT:     Minimally to non-antalgic    SKIN:     No abnormality    RANGE OF MOTION:    Slightly Limited by joint irritability with tightness in flexion and internal rotation    STRENGTH:    Not Limited by joint irratibility    DISTAL PULSES:    Paplable    DISTAL SENSATION :   Intact    LYMPHATICS:     No   lymphadenopathy    OTHER:          +   Stinchfeld test      -    log roll      -   Tenderness to palpation trochanteric bursa         Radiology:  Xrays 2views right hip (AP bilateral hips, and lateral of the hip) taken at an [facility] institution were reviewed  demonstrating moderate joint space narrowing and peripheral osteophyte formation consistent with moderate osteoarthritis.    Assessment/Plan: Right hip region pain.  I explained to him that I cannot guarantee him there is no other intra-abdominal pathology however based on radiographs and exam I think it is likely that this pressure sensation is a result of hip arthritis.  At this point as its only pressure and not pain and I have recommended continued observation.  Should his symptoms progress the neck step would be a fluoroscopy guided right hip cortisone injection for both diagnostic and therapeutic purposes.  I will wait to hear back and we will decide how to proceed should symptoms progress.      Obed Ramos MD  6/14/2022  "

## 2023-05-08 ENCOUNTER — OFFICE VISIT (OUTPATIENT)
Dept: ORTHOPEDIC SURGERY | Facility: CLINIC | Age: 78
End: 2023-05-08
Payer: MEDICARE

## 2023-05-08 VITALS — WEIGHT: 193 LBS | TEMPERATURE: 97.5 F | HEIGHT: 69 IN | BODY MASS INDEX: 28.58 KG/M2

## 2023-05-08 DIAGNOSIS — M25.561 PAIN IN BOTH KNEES, UNSPECIFIED CHRONICITY: Primary | ICD-10-CM

## 2023-05-08 DIAGNOSIS — M25.562 PAIN IN BOTH KNEES, UNSPECIFIED CHRONICITY: Primary | ICD-10-CM

## 2023-05-08 PROCEDURE — 99213 OFFICE O/P EST LOW 20 MIN: CPT | Performed by: NURSE PRACTITIONER

## 2023-05-08 RX ORDER — HYDRALAZINE HYDROCHLORIDE 10 MG/1
1 TABLET, FILM COATED ORAL 3 TIMES DAILY
COMMUNITY
Start: 2023-03-18

## 2023-05-08 NOTE — PROGRESS NOTES
Patient: Yunior Anguiano  YOB: 1945 77 y.o. male  Medical Record Number: [id number]    Chief Complaints:   Chief Complaint   Patient presents with   • Left Knee - Follow-up   • Right Knee - Follow-up       History of Present Illness:Yunior Anguiano is a 77 y.o. male who presents for follow-up of bilateral total knee replacements, specifically advanced polyethylene wear right knee, he is now over 20 years out from right total knee replacement had left total knee replacement in 2020, denies any pain, problems, instability, weakness.  Patient reports only occasional mild soreness otherwise knee is doing great.    Allergies:   Allergies   Allergen Reactions   • Amlodipine Besylate Anaphylaxis and Hives   • Cefdinir Hives     rash   • Losartan Itching     Itching and it stopped with he stopped the losartan   • Statins Other (See Comments)     ACHES cannot take   Muscles get WEAK   • Turmeric Hives and Rash   • Hydrocodone Provider Review Needed   • Erythromycin Base Nausea And Vomiting     vomits       Medications:   Current Outpatient Medications   Medication Sig Dispense Refill   • aspirin 81 MG EC tablet Take 1 tablet by mouth Daily.     • hydrALAZINE (APRESOLINE) 10 MG tablet Take 1 tablet by mouth 3 (Three) Times a Day.     • ZETIA 10 MG tablet Take 1 tablet by mouth Every Night.  5   • Cholecalciferol 25 MCG (1000 UT) capsule        No current facility-administered medications for this visit.         The following portions of the patient's history were reviewed and updated as appropriate: allergies, current medications, past family history, past medical history, past social history, past surgical history and problem list.    Review of Systems:   14 point review of systems was performed. All systems negative except pertinent positives/negatives listed in HPI above    Physical Exam:   Vitals:    05/08/23 0825   Temp: 97.5 °F (36.4 °C)   TempSrc: Temporal   Weight: 87.5 kg (193 lb)   Height:  "175.3 cm (69.02\")   PainSc: 0-No pain   PainLoc: Knee       General: A and O x 3, ASA, NAD   Skin clear no unusual lesions noted  Bilateral knees patient has well-healed surgical incisions noted excellent range of motion with no instability calf is soft and nontender       Radiology:  Xrays 3views (ap,lateral, sunrise) bilateral knees were ordered and reviewed today secondary to previous surgery show bilateral total knee replacements well-placed well-positioned, he does have significant polyethylene wear right knee but unchanged from previous x-rays.    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  He understands if he develops any pain swelling or instability he is to call us immediately, Tylenol as needed, we will see him back for follow-up in 1 year with repeat x-rays      Naty Ford, APRN  5/8/2023  "

## 2024-05-17 ENCOUNTER — OFFICE VISIT (OUTPATIENT)
Dept: ORTHOPEDIC SURGERY | Facility: CLINIC | Age: 79
End: 2024-05-17
Payer: MEDICARE

## 2024-05-17 VITALS — TEMPERATURE: 100.5 F | BODY MASS INDEX: 27.7 KG/M2 | WEIGHT: 187 LBS | HEIGHT: 69 IN

## 2024-05-17 DIAGNOSIS — Z96.652 TOTAL KNEE REPLACEMENT STATUS, LEFT: ICD-10-CM

## 2024-05-17 DIAGNOSIS — R52 PAIN: Primary | ICD-10-CM

## 2024-05-17 DIAGNOSIS — Z96.651 TOTAL KNEE REPLACEMENT STATUS, RIGHT: ICD-10-CM

## 2024-05-17 PROCEDURE — 99213 OFFICE O/P EST LOW 20 MIN: CPT | Performed by: NURSE PRACTITIONER

## 2024-05-17 NOTE — PROGRESS NOTES
"Patient: Yunior Anguiano  YOB: 1945 78 y.o. male  Medical Record Number: [id number]    Chief Complaints:   Chief Complaint   Patient presents with    Left Knee - Follow-up    Right Knee - Follow-up       History of Present Illness:Yunior Anguiano is a 78 y.o. male who presents for follow-up for bilateral total knee replacements.  Patient doing well, he has had some signs of some polyethylene wear right knee but is completely asymptomatic    Allergies:   Allergies   Allergen Reactions    Amlodipine Besylate Anaphylaxis and Hives    Cefdinir Hives     rash    Losartan Itching     Itching and it stopped with he stopped the losartan    Statins Other (See Comments)     ACHES cannot take   Muscles get WEAK    Turmeric Hives and Rash    Hydrocodone Provider Review Needed    Erythromycin Base Nausea And Vomiting     vomits       Medications:   Current Outpatient Medications   Medication Sig Dispense Refill    aspirin 81 MG EC tablet Take 1 tablet by mouth Daily.      Cholecalciferol 25 MCG (1000 UT) capsule       hydrALAZINE (APRESOLINE) 10 MG tablet Take 1 tablet by mouth 3 (Three) Times a Day.      ZETIA 10 MG tablet Take 1 tablet by mouth Every Night.  5     No current facility-administered medications for this visit.         The following portions of the patient's history were reviewed and updated as appropriate: allergies, current medications, past family history, past medical history, past social history, past surgical history and problem list.    Review of Systems:   14 point review of systems was performed. All systems negative except pertinent positives/negatives listed in HPI above    Physical Exam:   Vitals:    05/17/24 1258   Temp: 100.5 °F (38.1 °C)   TempSrc: Temporal   Weight: 84.8 kg (187 lb)   Height: 175.3 cm (69\")   PainSc: 0-No pain   PainLoc: Knee       General: A and O x 3, ASA, NAD    Bilateral knees patient has well-healed surgical incisions no X range of motion no instability " calf is soft and nontender      Radiology:  Xrays 3views (ap,lateral, sunrise) bilateral knees were ordered and reviewed today secondary to pain and show well-placed well-positioned bilateral total knee replacements he does have signs of significant polyethylene wear noted right knee but unchanged from previous x-rays    Assessment/Plan: 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

## 2024-10-25 ENCOUNTER — HOSPITAL ENCOUNTER (EMERGENCY)
Facility: HOSPITAL | Age: 79
Discharge: HOME | End: 2024-10-25
Attending: EMERGENCY MEDICINE
Payer: MEDICARE

## 2024-10-25 ENCOUNTER — APPOINTMENT (OUTPATIENT)
Dept: CARDIOLOGY | Facility: HOSPITAL | Age: 79
End: 2024-10-25
Payer: MEDICARE

## 2024-10-25 VITALS
TEMPERATURE: 97.6 F | DIASTOLIC BLOOD PRESSURE: 90 MMHG | OXYGEN SATURATION: 97 % | WEIGHT: 185 LBS | BODY MASS INDEX: 27.4 KG/M2 | HEART RATE: 64 BPM | RESPIRATION RATE: 18 BRPM | SYSTOLIC BLOOD PRESSURE: 154 MMHG | HEIGHT: 69 IN

## 2024-10-25 DIAGNOSIS — M79.662 PAIN IN LEFT LOWER LEG: Primary | ICD-10-CM

## 2024-10-25 LAB
ALBUMIN SERPL-MCNC: 4.3 G/DL (ref 3.5–5.2)
ALBUMIN/GLOB SERPL: 2.4 G/DL
ALP SERPL-CCNC: 69 U/L (ref 39–117)
ALT SERPL W P-5'-P-CCNC: 21 U/L (ref 1–41)
ANION GAP SERPL CALCULATED.3IONS-SCNC: 12 MMOL/L (ref 5–15)
APTT PPP: 35.2 SECONDS (ref 22.7–35.4)
AST SERPL-CCNC: 21 U/L (ref 1–40)
BASOPHILS # BLD AUTO: 0.01 10*3/MM3 (ref 0–0.2)
BASOPHILS NFR BLD AUTO: 0.2 % (ref 0–1.5)
BH CV LOWER VASCULAR LEFT COMMON FEMORAL AUGMENT: NORMAL
BH CV LOWER VASCULAR LEFT COMMON FEMORAL COMPETENT: NORMAL
BH CV LOWER VASCULAR LEFT COMMON FEMORAL COMPRESS: NORMAL
BH CV LOWER VASCULAR LEFT COMMON FEMORAL PHASIC: NORMAL
BH CV LOWER VASCULAR LEFT COMMON FEMORAL SPONT: NORMAL
BH CV LOWER VASCULAR LEFT DISTAL FEMORAL COMPRESS: NORMAL
BH CV LOWER VASCULAR LEFT GASTRONEMIUS COMPRESS: NORMAL
BH CV LOWER VASCULAR LEFT GREATER SAPH AK COMPRESS: NORMAL
BH CV LOWER VASCULAR LEFT GREATER SAPH BK COMPRESS: NORMAL
BH CV LOWER VASCULAR LEFT LESSER SAPH COMPRESS: NORMAL
BH CV LOWER VASCULAR LEFT MID FEMORAL AUGMENT: NORMAL
BH CV LOWER VASCULAR LEFT MID FEMORAL COMPETENT: NORMAL
BH CV LOWER VASCULAR LEFT MID FEMORAL COMPRESS: NORMAL
BH CV LOWER VASCULAR LEFT MID FEMORAL PHASIC: NORMAL
BH CV LOWER VASCULAR LEFT MID FEMORAL SPONT: NORMAL
BH CV LOWER VASCULAR LEFT PERONEAL COMPRESS: NORMAL
BH CV LOWER VASCULAR LEFT POPLITEAL AUGMENT: NORMAL
BH CV LOWER VASCULAR LEFT POPLITEAL COMPETENT: NORMAL
BH CV LOWER VASCULAR LEFT POPLITEAL COMPRESS: NORMAL
BH CV LOWER VASCULAR LEFT POPLITEAL PHASIC: NORMAL
BH CV LOWER VASCULAR LEFT POPLITEAL SPONT: NORMAL
BH CV LOWER VASCULAR LEFT POSTERIOR TIBIAL COMPRESS: NORMAL
BH CV LOWER VASCULAR LEFT PROFUNDA FEMORAL COMPRESS: NORMAL
BH CV LOWER VASCULAR LEFT PROXIMAL FEMORAL COMPRESS: NORMAL
BH CV LOWER VASCULAR LEFT SAPHENOFEMORAL JUNCTION COMPRESS: NORMAL
BH CV LOWER VASCULAR RIGHT COMMON FEMORAL AUGMENT: NORMAL
BH CV LOWER VASCULAR RIGHT COMMON FEMORAL COMPETENT: NORMAL
BH CV LOWER VASCULAR RIGHT COMMON FEMORAL COMPRESS: NORMAL
BH CV LOWER VASCULAR RIGHT COMMON FEMORAL PHASIC: NORMAL
BH CV LOWER VASCULAR RIGHT COMMON FEMORAL SPONT: NORMAL
BH CV VAS PRELIMINARY FINDINGS SCRIPTING: 1
BILIRUB SERPL-MCNC: 0.5 MG/DL (ref 0–1.2)
BUN SERPL-MCNC: 25 MG/DL (ref 8–23)
BUN/CREAT SERPL: 36.8 (ref 7–25)
CALCIUM SPEC-SCNC: 9 MG/DL (ref 8.6–10.5)
CHLORIDE SERPL-SCNC: 106 MMOL/L (ref 98–107)
CO2 SERPL-SCNC: 25 MMOL/L (ref 22–29)
CREAT SERPL-MCNC: 0.68 MG/DL (ref 0.76–1.27)
DEPRECATED RDW RBC AUTO: 42.2 FL (ref 37–54)
EGFRCR SERPLBLD CKD-EPI 2021: 95.7 ML/MIN/1.73
EOSINOPHIL # BLD AUTO: 0.11 10*3/MM3 (ref 0–0.4)
EOSINOPHIL NFR BLD AUTO: 2.4 % (ref 0.3–6.2)
ERYTHROCYTE [DISTWIDTH] IN BLOOD BY AUTOMATED COUNT: 12.4 % (ref 12.3–15.4)
GLOBULIN UR ELPH-MCNC: 1.8 GM/DL
GLUCOSE SERPL-MCNC: 102 MG/DL (ref 65–99)
HCT VFR BLD AUTO: 41.1 % (ref 37.5–51)
HGB BLD-MCNC: 13.6 G/DL (ref 13–17.7)
IMM GRANULOCYTES # BLD AUTO: 0.01 10*3/MM3 (ref 0–0.05)
IMM GRANULOCYTES NFR BLD AUTO: 0.2 % (ref 0–0.5)
INR PPP: 0.97 (ref 0.9–1.1)
LYMPHOCYTES # BLD AUTO: 0.88 10*3/MM3 (ref 0.7–3.1)
LYMPHOCYTES NFR BLD AUTO: 19.2 % (ref 19.6–45.3)
MCH RBC QN AUTO: 30.6 PG (ref 26.6–33)
MCHC RBC AUTO-ENTMCNC: 33.1 G/DL (ref 31.5–35.7)
MCV RBC AUTO: 92.4 FL (ref 79–97)
MONOCYTES # BLD AUTO: 0.28 10*3/MM3 (ref 0.1–0.9)
MONOCYTES NFR BLD AUTO: 6.1 % (ref 5–12)
NEUTROPHILS NFR BLD AUTO: 3.3 10*3/MM3 (ref 1.7–7)
NEUTROPHILS NFR BLD AUTO: 71.9 % (ref 42.7–76)
NRBC BLD AUTO-RTO: 0 /100 WBC (ref 0–0.2)
PLATELET # BLD AUTO: 149 10*3/MM3 (ref 140–450)
PMV BLD AUTO: 9.9 FL (ref 6–12)
POTASSIUM SERPL-SCNC: 3.7 MMOL/L (ref 3.5–5.2)
PROT SERPL-MCNC: 6.1 G/DL (ref 6–8.5)
PROTHROMBIN TIME: 13.1 SECONDS (ref 11.7–14.2)
RBC # BLD AUTO: 4.45 10*6/MM3 (ref 4.14–5.8)
SODIUM SERPL-SCNC: 143 MMOL/L (ref 136–145)
WBC NRBC COR # BLD AUTO: 4.59 10*3/MM3 (ref 3.4–10.8)

## 2024-10-25 PROCEDURE — 99284 EMERGENCY DEPT VISIT MOD MDM: CPT

## 2024-10-25 PROCEDURE — 85610 PROTHROMBIN TIME: CPT | Performed by: PHYSICIAN ASSISTANT

## 2024-10-25 PROCEDURE — 93971 EXTREMITY STUDY: CPT

## 2024-10-25 PROCEDURE — 80053 COMPREHEN METABOLIC PANEL: CPT | Performed by: PHYSICIAN ASSISTANT

## 2024-10-25 PROCEDURE — 93971 EXTREMITY STUDY: CPT | Performed by: SURGERY

## 2024-10-25 PROCEDURE — 85025 COMPLETE CBC W/AUTO DIFF WBC: CPT | Performed by: PHYSICIAN ASSISTANT

## 2024-10-25 PROCEDURE — 85730 THROMBOPLASTIN TIME PARTIAL: CPT | Performed by: PHYSICIAN ASSISTANT

## 2024-10-25 RX ORDER — TRAMADOL HYDROCHLORIDE 50 MG/1
50 TABLET ORAL EVERY 12 HOURS PRN
Qty: 6 TABLET | Refills: 0 | Status: SHIPPED | OUTPATIENT
Start: 2024-10-25 | End: 2024-10-28

## 2024-10-25 NOTE — ED NOTES
Pt to ED c/o L calf and L leg pain x1 week. Pt states he has been seen outpatient for it and was told it may be his magnesium but his PCP states his magnesium is within normal range. Pt says the pain has been getting worse. Pt takes 81mg ASA daily. Pt denies any SOA or CP.    L calf looks slightly bigger than R in triage but no redness or warmth noted.

## 2024-10-25 NOTE — ED PROVIDER NOTES
EMERGENCY DEPARTMENT MD ATTESTATION NOTE    Room Number:  12/12  PCP: Miguelina Quiroga MD  Independent Historians: Patient    HPI:  A complete HPI/ROS/PMH/PSH/SH/FH are unobtainable due to: None    Chronic or social conditions impacting patient care (Social Determinants of Health): None      Context: Yunior Anguiano is a 79 y.o. male with a medical history of hypertension and arthritis who presents to the ED c/o acute pain in the left calf that has been progressively worsening for 1 week.  Patient says that last Thursday he began having pain in his left calf overnight.  The following day while he was swimming laps in a pool, he noticed that the pain was escalating in his left leg to the point that he had to stop his typical exercise routine.  Then, in the following days, he has had progressively worsening pain to the point that last night he could not bear the pain any longer.  Today it is difficult for him to even bear weight or ambulate on the left leg because of the pain in the calf.  He denies any injuries to the leg at all.  He had been seen at an urgent care center and they had said suspected maybe his magnesium level was low because of recent PPI therapy that he started.  However despite stopping the Prilosec he is not feeling any better in this left leg area.  He denies any chest pain or difficulties breathing.  Denies any fevers.  Denies any insect bites or areas of redness to the legs.      Review of prior external notes (non-ED) -and- Review of prior external test results outside of this encounter: I independently reviewed the internal medicine progress note from October 23, 2024.  Assessment and plan included management of hypertension, hyperlipidemia, vitamin D deficiency, CAD, GERD and muscle cramping.  Magnesium level was discussed at that time.    Prescription drug monitoring program review: GENET reviewed by Christopher Tavera MD   N/A and GENET query complete and reviewed. Treatment plan to  include limited course of prescribed controlled substance. Risks including addiction, benefits, and alternatives presented to patient.      PHYSICAL EXAM    I have reviewed the triage vital signs and nursing notes.    ED Triage Vitals [10/25/24 0542]   Temp Heart Rate Resp BP SpO2   97.6 °F (36.4 °C) 84 18 175/100 98 %      Temp src Heart Rate Source Patient Position BP Location FiO2 (%)   -- -- -- -- --       Physical Exam  GENERAL: alert, no acute distress  SKIN: Warm, dry, no rashes  HENT: Normocephalic, atraumatic  EYES: no scleral icterus  CV: regular rhythm, regular rate  RESPIRATORY: normal effort, lungs clear  ABDOMEN: soft, nontender, nondistended  MUSCULOSKELETAL: no deformity, no erythema or induration of the soft tissues.  Moderate tenderness is noted to the left posterior lower leg area and patient has decreased range of motion passively for plantarflexion and dorsiflexion of the left foot and ankle because of pain on movement.  However the foot is warm and well-perfused.  Distal neurovascular exam is normal.  NEURO: alert, moves all extremities, follows commands      MEDICATIONS GIVEN IN ER  Medications - No data to display      ORDERS PLACED DURING THIS VISIT:  Orders Placed This Encounter   Procedures    Comprehensive Metabolic Panel    Protime-INR    aPTT    CBC Auto Differential    CBC & Differential         PROCEDURES  Procedures          PROGRESS, DATA ANALYSIS, CONSULTS, AND MEDICAL DECISION MAKING  All labs have been independently interpreted by me.  All radiology studies have been reviewed by me. All EKG's have been independently viewed and interpreted by me.  Discussion below represents my analysis of pertinent findings related to patient's condition, differential diagnosis, treatment plan and final disposition.    Differential diagnosis includes but is not limited to DVT, superficial thrombophlebitis, myositis, tendinopathy, arthritis.    Clinical Scores:                   MDM:     ED Course  "as of 10/25/24 0830   Fri Oct 25, 2024   0713 Potassium: 3.7 [JOSE G]   0716 WBC: 4.59 [KA]   0716 Hemoglobin: 13.6 [KA]   0716 INR: 0.97 [KA]   0716 Glucose(!): 102 [KA]   0716 Creatinine(!): 0.68 [KA]   0753 I discussed the patient with Holly, vascular .  Doppler ultrasound negative for DVT [KA]   0829 I have reviewed the venous duplex ultrasound left lower extremity which is negative for DVT.  I discussed this test result with the patient at the bedside.  His lab work looks to be quite reassuring as well.  White blood cell count is normal.  Electrolyte panel was normal also.  On physical exam there is no signs of infection or worrisome dermatologic change.  I presume his pain is musculoskeletal in nature.  I advised that he follow-up with his established orthopedist for further evaluation and treatment.  Will review with him the usual \"return to ER\" instructions prior to discharge. [JOSE G]      ED Course User Index  [JOSE G] Christopher Tavera MD  [KA] Kalie Cleveland PA-C         COMPLEXITY OF CARE  Admission was considered but after careful review of the patient's presentation, physical examination, diagnostic results, and response to treatment the patient may be safely discharged with outpatient follow-up.    Please note that portions of this document were completed with a voice recognition program.    Note Disclaimer: At [facility], we believe that sharing information builds trust and better relationships. You are receiving this note because you recently visited [facility]. It is possible you will see health information before a provider has talked with you about it. This kind of information can be easy to misunderstand. To help you fully understand what it means for your health, we urge you to discuss this note with your provider.       Christopher Tavera MD  10/25/24 0831    "

## 2024-10-25 NOTE — ED PROVIDER NOTES
EMERGENCY DEPARTMENT ENCOUNTER  Room Number:  12/12  PCP: Yunior Huertas, BUCK  Independent Historians: Patient      HPI:  Chief Complaint: had concerns including Leg Pain.     A complete HPI/ROS/PMH/PSH/SH/FH are unobtainable due to: None    Chronic or social conditions impacting patient care (Social Determinants of Health): None      Context: The patient is a 79 y.o. male with a medical history of osteoarthritis, CAD who presents to the ED c/o acute left calf pain.  1 week ago while sleeping he developed sharp pain in his mid left calf.  It has persisted since and gradually worsened.  He states it is better with activity and walking, notices it particularly at night.  Also noticed that when pushing off of the wall of the pool while swimming laps.  Last night was the most severe episode he had had which she states took over an hour of trying to get out of bed and walk around to relieve the symptoms.  He has been seen by urgent care who recommended symptomatic treatment and PCP who performed labs that were reportedly unremarkable.  Was recommended to start a calcium and D3 supplement yesterday and has not had a chance to do so yet.  No history of DVT or PE, denies chest pain or shortness of breath, no recent immobilization, trauma or travel, no exogenous hormone use.      Review of prior external notes (non-ED) -and- Review of prior external test results outside of this encounter:  Labs performed 10/23/2024.  Potassium 4.3, sodium 141, calcium 8.8 , Hemoglobin 13.9, CBC otherwise unremarkable        PAST MEDICAL HISTORY  Active Ambulatory Problems     Diagnosis Date Noted    Primary osteoarthritis of knee 12/11/2019    OA (osteoarthritis) of knee 03/06/2020     Resolved Ambulatory Problems     Diagnosis Date Noted    No Resolved Ambulatory Problems     Past Medical History:   Diagnosis Date    Arthritis     Cardiac disease     Cardiac murmur     CTS (carpal tunnel syndrome) 2016    Fracture, foot 2013     History of chest pain     History of fractured pelvis     History of pancreatitis     Hypertension     Knee swelling 1968    Low back strain 2018    Sleep apnea     Tear of meniscus of knee 1968         PAST SURGICAL HISTORY  Past Surgical History:   Procedure Laterality Date    CARPAL TUNNEL RELEASE Right 2018    CATARACT EXTRACTION, BILATERAL      CHOLECYSTECTOMY  2014    CORONARY STENT PLACEMENT  2015    mid LAD    FOOT SURGERY Left 1992    bone growth removal    HAND SURGERY  2018    Carpal tunnel    HEMORRHOIDECTOMY      JOINT REPLACEMENT Right 2003    knee    KNEE SURGERY Right 1968    ACL repair    RETINAL DETACHMENT REPAIR Right     TOTAL KNEE ARTHROPLASTY Left 3/6/2020    Procedure: TOTAL KNEE ARTHROPLASTY;  Surgeon: Obed Ramos MD;  Location: [facility] MAIN OR;  Service: Orthopedics;  Laterality: Left;    TRIGGER POINT INJECTION  Fo get 2016         FAMILY HISTORY  Family History   Problem Relation Age of Onset    Hypertension Mother     Malig Hyperthermia Neg Hx          SOCIAL HISTORY  Social History     Socioeconomic History    Marital status:    Tobacco Use    Smoking status: Never     Passive exposure: Never    Smokeless tobacco: Never   Vaping Use    Vaping status: Never Used   Substance and Sexual Activity    Alcohol use: Yes     Alcohol/week: 5.0 standard drinks of alcohol     Types: 1 Glasses of wine, 3 Cans of beer, 1 Shots of liquor per week     Comment: 2-3 TIMES PER WEEK    Drug use: No    Sexual activity: Not Currently         ALLERGIES  Amlodipine besylate, Cefdinir, Losartan, Statins, Turmeric, Hydrocodone, and Erythromycin base      REVIEW OF SYSTEMS  Review of Systems  Included in HPI  All systems reviewed and negative except for those discussed in HPI.      PHYSICAL EXAM    I have reviewed the triage vital signs and nursing notes.    ED Triage Vitals [10/25/24 0542]   Temp Heart Rate Resp BP SpO2   97.6 °F (36.4 °C) 84 18 175/100 98 %      Temp src Heart Rate Source Patient  Position BP Location FiO2 (%)   -- -- -- -- --       Physical Exam  GENERAL: alert, no acute distress  SKIN: Warm, dry  HENT: Normocephalic, atraumatic  EYES: no scleral icterus  CV: regular rhythm, regular rate, systolic murmur (known, under surveillance per patient)  RESPIRATORY: normal effort, lungs clear  ABDOMEN: nondistended  MUSCULOSKELETAL: no deformity.  On inspection of bilateral lower extremities there is no edema, no significant asymmetry.  Bilateral calves are nontender.  Negative Homans' sign bilaterally.  DP PT pulses 2+ bilaterally, cap refill brisk, sensation intact distally, feet are warm natural color and dry.  Strength is 5 out of 5 and symmetrical in the bilateral lower extremities.  Patellar reflexes significantly diminished bilaterally, history of bilateral knee replacements.  NEURO: alert, moves all extremities, follows commands            LAB RESULTS  Recent Results (from the past 24 hours)   Comprehensive Metabolic Panel    Collection Time: 10/25/24  6:33 AM    Specimen: Blood   Result Value Ref Range    Glucose 102 (H) 65 - 99 mg/dL    BUN 25 (H) 8 - 23 mg/dL    Creatinine 0.68 (L) 0.76 - 1.27 mg/dL    Sodium 143 136 - 145 mmol/L    Potassium 3.7 3.5 - 5.2 mmol/L    Chloride 106 98 - 107 mmol/L    CO2 25.0 22.0 - 29.0 mmol/L    Calcium 9.0 8.6 - 10.5 mg/dL    Total Protein 6.1 6.0 - 8.5 g/dL    Albumin 4.3 3.5 - 5.2 g/dL    ALT (SGPT) 21 1 - 41 U/L    AST (SGOT) 21 1 - 40 U/L    Alkaline Phosphatase 69 39 - 117 U/L    Total Bilirubin 0.5 0.0 - 1.2 mg/dL    Globulin 1.8 gm/dL    A/G Ratio 2.4 g/dL    BUN/Creatinine Ratio 36.8 (H) 7.0 - 25.0    Anion Gap 12.0 5.0 - 15.0 mmol/L    eGFR 95.7 >60.0 mL/min/1.73   Protime-INR    Collection Time: 10/25/24  6:33 AM    Specimen: Blood   Result Value Ref Range    Protime 13.1 11.7 - 14.2 Seconds    INR 0.97 0.90 - 1.10   aPTT    Collection Time: 10/25/24  6:33 AM    Specimen: Blood   Result Value Ref Range    PTT 35.2 22.7 - 35.4 seconds   CBC Auto  Differential    Collection Time: 10/25/24  6:33 AM    Specimen: Blood   Result Value Ref Range    WBC 4.59 3.40 - 10.80 10*3/mm3    RBC 4.45 4.14 - 5.80 10*6/mm3    Hemoglobin 13.6 13.0 - 17.7 g/dL    Hematocrit 41.1 37.5 - 51.0 %    MCV 92.4 79.0 - 97.0 fL    MCH 30.6 26.6 - 33.0 pg    MCHC 33.1 31.5 - 35.7 g/dL    RDW 12.4 12.3 - 15.4 %    RDW-SD 42.2 37.0 - 54.0 fl    MPV 9.9 6.0 - 12.0 fL    Platelets 149 140 - 450 10*3/mm3    Neutrophil % 71.9 42.7 - 76.0 %    Lymphocyte % 19.2 (L) 19.6 - 45.3 %    Monocyte % 6.1 5.0 - 12.0 %    Eosinophil % 2.4 0.3 - 6.2 %    Basophil % 0.2 0.0 - 1.5 %    Immature Grans % 0.2 0.0 - 0.5 %    Neutrophils, Absolute 3.30 1.70 - 7.00 10*3/mm3    Lymphocytes, Absolute 0.88 0.70 - 3.10 10*3/mm3    Monocytes, Absolute 0.28 0.10 - 0.90 10*3/mm3    Eosinophils, Absolute 0.11 0.00 - 0.40 10*3/mm3    Basophils, Absolute 0.01 0.00 - 0.20 10*3/mm3    Immature Grans, Absolute 0.01 0.00 - 0.05 10*3/mm3    nRBC 0.0 0.0 - 0.2 /100 WBC   Duplex Venous Lower Extremity - Left    Collection Time: 10/25/24  7:50 AM   Result Value Ref Range    Right Common Femoral Spont Y     Right Common Femoral Competent Y     Right Common Femoral Phasic Y     Right Common Femoral Compress C     Right Common Femoral Augment Y     Left Common Femoral Spont Y     Left Common Femoral Competent Y     Left Common Femoral Phasic Y     Left Common Femoral Compress C     Left Common Femoral Augment Y     Left Saphenofemoral Junction Compress C     Left Profunda Femoral Compress C     Left Proximal Femoral Compress C     Left Mid Femoral Spont Y     Left Mid Femoral Competent Y     Left Mid Femoral Phasic Y     Left Mid Femoral Compress C     Left Mid Femoral Augment Y     Left Distal Femoral Compress C     Left Popliteal Spont Y     Left Popliteal Competent Y     Left Popliteal Phasic Y     Left Popliteal Compress C     Left Popliteal Augment Y     Left Posterior Tibial Compress C     Left Peroneal Compress C     Left  Gastronemius Compress C     Left Greater Saph AK Compress C     Left Greater Saph BK Compress C     Left Lesser Saph Compress C     BH CV VAS PRELIMINARY FINDINGS SCRIPTING 1.0          RADIOLOGY  Duplex Venous Lower Extremity - Left    Result Date: 10/25/2024    Normal left lower extremity venous duplex scan.        MEDICATIONS GIVEN IN ER  Medications - No data to display      ORDERS PLACED DURING THIS VISIT:  Orders Placed This Encounter   Procedures    Comprehensive Metabolic Panel    Protime-INR    aPTT    CBC Auto Differential    CBC & Differential         OUTPATIENT MEDICATION MANAGEMENT:  No current Epic-ordered facility-administered medications on file.     Current Outpatient Medications Ordered in Epic   Medication Sig Dispense Refill    aspirin 81 MG EC tablet Take 1 tablet by mouth Daily.      Cholecalciferol 25 MCG (1000 UT) capsule       hydrALAZINE (APRESOLINE) 10 MG tablet Take 1 tablet by mouth 3 (Three) Times a Day.      traMADol (ULTRAM) 50 MG tablet Take 1 tablet by mouth Every 12 (Twelve) Hours As Needed for Moderate Pain for up to 3 days. 6 tablet 0    ZETIA 10 MG tablet Take 1 tablet by mouth Every Night.  5         PROCEDURES  Procedures            PROGRESS, DATA ANALYSIS, CONSULTS, AND MEDICAL DECISION MAKING  All labs have been independently interpreted by me.  All radiology studies have been reviewed by me. All EKG's have been independently viewed and interpreted by me.  Discussion below represents my analysis of pertinent findings related to patient's condition, differential diagnosis, treatment plan and final disposition.    DIFFERENTIAL    Differential diagnosis includes was not limited to DVT, radiculopathy, neuritis, strain, spasm    Clinical Scores:                  ED Course as of 10/25/24 1441   Fri Oct 25, 2024   0713 Potassium: 3.7 [JOSE G]   0716 WBC: 4.59 [KA]   0716 Hemoglobin: 13.6 [KA]   0716 INR: 0.97 [KA]   0716 Glucose(!): 102 [KA]   0716 Creatinine(!): 0.68 [KA]   0753 I  "discussed the patient with Holly, vascular .  Doppler ultrasound negative for DVT [KA]   0523 I have reviewed the venous duplex ultrasound left lower extremity which is negative for DVT.  I discussed this test result with the patient at the bedside.  His lab work looks to be quite reassuring as well.  White blood cell count is normal.  Electrolyte panel was normal also.  On physical exam there is no signs of infection or worrisome dermatologic change.  I presume his pain is musculoskeletal in nature.  I advised that he follow-up with his established orthopedist for further evaluation and treatment.  Will review with him the usual \"return to ER\" instructions prior to discharge. [JOSE G]      ED Course User Index  [JOSE G] Christopher Tavera MD  [KA] Kalie Cleveland PA-C             AS OF 14:41 EDT VITALS:    BP - 154/90  HR - 64  TEMP - 97.6 °F (36.4 °C)  O2 SATS - 97%    COMPLEXITY OF CARE  Admission was considered but after careful review of the patient's presentation, physical examination, diagnostic results, and response to treatment the patient may be safely discharged with outpatient follow-up.      DIAGNOSIS  Final diagnoses:   Pain in left lower leg         DISPOSITION  ED Disposition       ED Disposition   Discharge    Condition   Stable    Comment   --                  FOLLOW UP  Obed Ramos MD  [address] [place], [place] [id number] [phone]    Call today  Call your orthopedics doctor today to schedule a prompt follow-up appointment as we discussed.    Miguelina Quiroga MD  [address] [id number] [phone]    Schedule an appointment as soon as possible for a visit           Prescribed Medications     Medication List        New Prescriptions      traMADol 50 MG tablet  Commonly known as: ULTRAM  Take 1 tablet by mouth Every 12 (Twelve) Hours As Needed for Moderate Pain for up to 3 days.               Where to Get Your Medications        These medications were sent to " [facility]/pharmacy #[id number] - [place], [place] - [address] AT [address] - [phone] [facility] [phone]   [address] [facility] [address]      Phone: [phone]   traMADol 50 MG tablet                   Please note that portions of this document were completed with a voice recognition program.    Note Disclaimer: At [facility], we believe that sharing information builds trust and better relationships. You are receiving this note because you recently visited [facility]. It is possible you will see health information before a provider has talked with you about it. This kind of information can be easy to misunderstand. To help you fully understand what it means for your health, we urge you to discuss this note with your provider.         Kalie Cleveland PA-C  10/25/24 8863

## 2024-10-28 ENCOUNTER — OFFICE VISIT (OUTPATIENT)
Dept: ORTHOPEDIC SURGERY | Facility: CLINIC | Age: 79
End: 2024-10-28
Payer: MEDICARE

## 2024-10-28 VITALS — HEIGHT: 69 IN | BODY MASS INDEX: 28.76 KG/M2 | TEMPERATURE: 97.3 F | WEIGHT: 194.2 LBS

## 2024-10-28 DIAGNOSIS — R52 PAIN: Primary | ICD-10-CM

## 2024-10-28 DIAGNOSIS — M54.50 LOW BACK PAIN POTENTIALLY ASSOCIATED WITH RADICULOPATHY: ICD-10-CM

## 2024-10-28 PROCEDURE — 99214 OFFICE O/P EST MOD 30 MIN: CPT | Performed by: NURSE PRACTITIONER

## 2024-10-28 PROCEDURE — 72100 X-RAY EXAM L-S SPINE 2/3 VWS: CPT | Performed by: NURSE PRACTITIONER

## 2024-10-28 PROCEDURE — 1160F RVW MEDS BY RX/DR IN RCRD: CPT | Performed by: NURSE PRACTITIONER

## 2024-10-28 PROCEDURE — 1159F MED LIST DOCD IN RCRD: CPT | Performed by: NURSE PRACTITIONER

## 2024-10-28 RX ORDER — METHYLPREDNISOLONE 4 MG/1
TABLET ORAL
Qty: 21 TABLET | Refills: 0 | Status: SHIPPED | OUTPATIENT
Start: 2024-10-28

## 2024-10-28 NOTE — PROGRESS NOTES
Patient: Yunior Anguiano  YOB: 1945 79 y.o. male  Medical Record Number: [id number]    Chief Complaints:   Chief Complaint   Patient presents with    Left Knee - Pain, Initial Evaluation    Left Lower Leg - Pain, Initial Evaluation       History of Present Illness:Yunoir Anguiano is a 79 y.o. male who presents with complaints of left leg pain.  The patient reports that starting on October 17 he woke up 4 different times with severe sharp searing type pain in his left calf, he tried swimming the next morning the pain got worse he eventually ended up going to first his PCP that did blood work it was normal, then eventually the emergency room venous Doppler was negative for DVT.  Patient reports soreness and stiffness in his low back pain but reports that he has intermittent episodes of searing sharp stabbing type pain in his left calf, if he gets up and moves around or change position in bed it actually goes away.    Allergies:   Allergies   Allergen Reactions    Amlodipine Besylate Anaphylaxis and Hives    Cefdinir Hives     rash    Losartan Itching     Itching and it stopped with he stopped the losartan    Statins Other (See Comments)     ACHES cannot take   Muscles get WEAK    Turmeric Hives and Rash    Hydrocodone Provider Review Needed    Erythromycin Base Nausea And Vomiting     vomits       Medications:   Current Outpatient Medications   Medication Sig Dispense Refill    aspirin 81 MG EC tablet Take 1 tablet by mouth Daily.      hydrALAZINE (APRESOLINE) 10 MG tablet Take 1 tablet by mouth 3 (Three) Times a Day.      traMADol (ULTRAM) 50 MG tablet Take 1 tablet by mouth Every 12 (Twelve) Hours As Needed for Moderate Pain for up to 3 days. 6 tablet 0    ZETIA 10 MG tablet Take 1 tablet by mouth Every Night. (Patient not taking: Reported on 10/28/2024)  5     No current facility-administered medications for this visit.         The following portions of the patient's history were reviewed and  "updated as appropriate: allergies, current medications, past family history, past medical history, past social history, past surgical history and problem list.    Review of Systems:   14 point review of systems was performed. All systems negative except pertinent positives/negatives listed in HPI above    Physical Exam:   Vitals:    10/28/24 0944   Temp: 97.3 °F (36.3 °C)   Weight: 88.1 kg (194 lb 3.2 oz)   Height: 175.3 cm (69\")   PainSc:   6   PainLoc: Knee       General: A and O x 3, ASA, NAD    Skin clear no unusual lesions noted  Back the patient has decreased range of motion secondary to pain with a positive left straight leg raise he has normal left hip and knee exam, calf is soft and nontender at this time      Radiology:  Xrays 2 views of the lumbar spine were ordered and reviewed today secondary to pain and show severe arthritic changes noted.  No compared to views available    Assessment/Plan: Low back pain with radiculopathy--worsening in nature    Unfortunately it appears as though the patient's pain most likely is coming from a radicular source from his lower back, we discussed options, we will proceed with an MRI of the lumbar spine to further evaluate in the meantime we will try a Medrol Dosepak to see if that helps provide some temporary relief, once we get the results back we will contact patient regarding treatment options      ANGELA Dominguez  10/28/2024  Answers submitted by the patient for this visit:  Primary Reason for Visit (Submitted on 10/26/2024)  What is the primary reason for your visit?: Extremity Pain  Lower Extremity Injury Questionnaire (Submitted on 10/26/2024)  Chief Complaint: Extremity pain  Injury: No  Pain location: left lower leg  Pain quality: aching, stabbing  Pain - numeric: 10/10  Progression since onset: comes and goes  tingling: No  inability to bear weight: Yes  numbness: No  lower extremity swelling: No  redness: No    "

## 2024-11-20 ENCOUNTER — HOSPITAL ENCOUNTER (OUTPATIENT)
Dept: MRI IMAGING | Facility: HOSPITAL | Age: 79
Discharge: HOME | End: 2024-11-20
Admitting: NURSE PRACTITIONER
Payer: MEDICARE

## 2024-11-20 DIAGNOSIS — M54.50 LOW BACK PAIN POTENTIALLY ASSOCIATED WITH RADICULOPATHY: ICD-10-CM

## 2024-11-20 PROCEDURE — 72148 MRI LUMBAR SPINE W/O DYE: CPT

## 2024-11-25 DIAGNOSIS — M54.50 LOW BACK PAIN POTENTIALLY ASSOCIATED WITH RADICULOPATHY: Primary | ICD-10-CM

## 2024-12-18 ENCOUNTER — HOSPITAL ENCOUNTER (OUTPATIENT)
Dept: PHYSICAL THERAPY | Facility: HOSPITAL | Age: 79
Discharge: HOME | End: 2024-12-18
Admitting: NURSE PRACTITIONER
Payer: MEDICARE

## 2024-12-18 DIAGNOSIS — M54.42 CHRONIC MIDLINE LOW BACK PAIN WITH LEFT-SIDED SCIATICA: Primary | ICD-10-CM

## 2024-12-18 DIAGNOSIS — M79.662 PAIN OF LEFT CALF: ICD-10-CM

## 2024-12-18 DIAGNOSIS — G89.29 CHRONIC MIDLINE LOW BACK PAIN WITH LEFT-SIDED SCIATICA: Primary | ICD-10-CM

## 2024-12-18 PROCEDURE — 97110 THERAPEUTIC EXERCISES: CPT

## 2024-12-18 PROCEDURE — 97161 PT EVAL LOW COMPLEX 20 MIN: CPT

## 2024-12-18 NOTE — THERAPY EVALUATION
Outpatient Physical Therapy Ortho Initial Evaluation  [facility]     Patient Name: Yunior Anguiano  : 1945  MRN: [id number]  Today's Date: 2024      Visit Date: 2024    Patient Active Problem List   Diagnosis    Primary osteoarthritis of knee    OA (osteoarthritis) of knee        Past Medical History:   Diagnosis Date    Arthritis     LEFT KNEE-GETS STIFF AFTER SITTING AND UNSTABLE GETTING UP FROM SITTING POSITION    Cardiac disease     Cardiac murmur     CTS (carpal tunnel syndrome) 2016    Surgery 2018    Fracture, foot 2013    History of chest pain     History of fractured pelvis     History of pancreatitis     Hypertension     Knee swelling 1968    Low back strain 2018    PT resolved it    Sleep apnea     Tear of meniscus of knee         Past Surgical History:   Procedure Laterality Date    CARPAL TUNNEL RELEASE Right 2018    CATARACT EXTRACTION, BILATERAL      CHOLECYSTECTOMY  2014    CORONARY STENT PLACEMENT  2015    mid LAD    FOOT SURGERY Left     bone growth removal    HAND SURGERY  2018    Carpal tunnel    HEMORRHOIDECTOMY      JOINT REPLACEMENT Right 2003    knee    KNEE SURGERY Right     ACL repair    RETINAL DETACHMENT REPAIR Right     TOTAL KNEE ARTHROPLASTY Left 3/6/2020    Procedure: TOTAL KNEE ARTHROPLASTY;  Surgeon: Obed Ramos MD;  Location: [facility];  Service: Orthopedics;  Laterality: Left;    TRIGGER POINT INJECTION  Fo get        Visit Dx:     ICD-10-CM ICD-9-CM   1. Chronic midline low back pain with left-sided sciatica  M54.42 724.2    G89.29 724.3     338.29   2. Pain of left calf  M79.662 729.5          Patient History       Row Name 24 0800             History    Chief Complaint Pain;Tinglings  -DR      Type of Pain Hip pain;Lower Extremity / Leg  -DR      Date Current Problem(s) Began 10/19/24  -      Brief Description of Current Complaint Yunior Anguiano is a 79 y.o. male who presents to physical therapy today with primary  compliant of LBP with L-sided radiculopathy that began in October, after waking in the middle of the night with severe pain in L calf. After several days of experiencing these symptoms, he went to ED where they ruled out DVT through ultrasound and provided pt with steroid pack which significantly improved symptoms. MRI and x-ray completed, showing severe lumbar arthritis, L4-5 central canal stenosis, mild disc bulge and grade 1 anterolisthesis L4 on L5. Associated symptoms include (+) N/T down B LE (L>R) when symptoms are flared. He does have a lumbar epidural injection scheduled for 1/2/25. Yunior Anguiano reports increased pain with laying in any position, sitting for long periods. Pain relieving factors include walking, stretching, sleeping in recliner, steroid pack (given at time of injury), heat, moving around. Pt is very active, participating in swimming, golfing, cooking; limited in his ability to complete the intensity of swimming he was doing previously. Pt is wife's primary caregiver as well. PMH includes HTN. Yunior Anguiano primary goal for attending skilled physical therapy is to return to recreational activities pain-free.  -      Previous treatment for THIS PROBLEM --  previous PT 7-8 yrs ago for low back strain  -      Patient/Caregiver Goals Relieve pain;Return to prior level of function  -      Hand Dominance right-handed  -      Occupation/sports/leisure activities Swimming, gardening, golf, cooking, general household chores  -      What clinical tests have you had for this problem? X-ray;MRI  -DR      Results of Clinical Tests Xray - severe arthritic changes lumbar spine    MRI:  L4-L5 where there is severe  central canal stenosis and lateral recess narrowing bilaterally  secondary to posterior element degenerative disease, a mild broad-based  disc bulge and a grade 1 anterolisthesis of L4 upon L5. There is no  evidence of a focal herniation  -         Pain     Pain Location --   L calf  -DR      Pain at Present --  0.5, described as tightness in L calf  -DR      Pain at Best 0  -DR      Pain at Worst 9  -DR      Pain Frequency Intermittent;Several days a week  -DR      Pain Description Tightness;Tingling;Sharp;Shooting;Numbness  -DR      What Performance Factors Make the Current Problem(s) WORSE? laying in any position, sitting for long periods  -DR      What Performance Factors Make the Current Problem(s) BETTER? walking, stretching, sleeping in recliner, steroid pack (given at time of injury), heat, moving around  -DR      Tolerance Time- Lying 30 minutes  -DR      Is your sleep disturbed? Yes  unable to sleep in bed  -DR      Difficulties with ADL's? no  -DR      Difficulties with recreational activities? can't do the intensity he was doing previously re: swimming; able to cook  -DR         Fall Risk Assessment    Any falls in the past year: No  -DR         Services    Prior Rehab/Home Health Experiences No  -DR         Daily Activities    Primary Language English  -DR      How does patient learn best? Demonstration;Pictures/Video;Listening;Reading  -DR      Barriers to learning None  -DR      Pt Participated in POC and Goals Yes  -DR         Safety    Are you being hurt, hit, or frightened by anyone at home or in your life? No  -DR      Are you being neglected by a caregiver No  -DR      Have you had any of the following issues with N/A  -DR                User Key  (r) = Recorded By, (t) = Taken By, (c) = Cosigned By      Initials Name Provider Type    Jose C Otero, PT Physical Therapist                     PT Ortho       Row Name 12/18/24 0800       Posture/Observations    Alignment Options Forward head;Rounded shoulders;Lumbar lordosis  -DR    Forward Head Mild  -DR    Rounded Shoulders Moderate  -DR    Lumbar lordosis Mild;Decreased  -DR       Quarter Clearing    Quarter Clearing Lower Quarter Clearing  -DR       DTR- Lower Quarter Clearing    Patellar tendon (L2-4)  Bilateral:;1- Minimal response  -DR    Achilles tendon (S1-2) Bilateral:;1- Minimal response  -DR       Neural Tension Signs- Lower Quarter Clearing    Slump Bilateral:;Negative  -DR    SLR Right:;Negative;Left:;Positive  L very mild  -DR       Myotomal Screen- Lower Quarter Clearing    Hip flexion (L2) Bilateral:;4+ (Good +)  -DR    Knee extension (L3) Bilateral:;5 (Normal)  -DR    Ankle DF (L4) Bilateral:;5 (Normal)  -DR    Knee flexion (S2) Bilateral:;5 (Normal)  -DR       Lumbar ROM Screen- Lower Quarter Clearing    Lumbar Flexion Normal  -DR    Lumbar Extension Normal  -DR    Lumbar Lateral Flexion Impaired  L LF provoked tingling to L calf into achilles  -DR    Lumbar Rotation Impaired  L rotation provoked tingling to L achilles/calf  -DR       SI/Hip Screen- Lower Quarter Clearing    Michele's/Luis's test Bilateral:;Negative  -DR       Special Tests/Palpation    Special Tests/Palpation Lumbar/SI  -DR       Lumbosacral Accessory Motions    Lumbosacral Accessory Motions Tested? Yes  -DR    PA [place]- L3 Center:;Hypomobile  -DR    PA [place]- L4 Center:;Hypomobile  -DR    PA [place]- L5 Center:;Hypomobile  -DR       Lumbosacral Palpation    Lumbosacral Palpation? Yes  -DR    Lumbosacral Segment Bilateral:;Guarded/taut  -DR    Piriformis Bilateral:;Guarded/taut  -DR    Erector Spinae (Paraspinals) Bilateral:;Guarded/taut  -DR       MMT (Manual Muscle Testing)    Rt Lower Ext Rt Hip ABduction;Rt Hip Extension  -DR    Lt Lower Ext Lt Hip ABduction;Lt Hip Extension  -DR       MMT Right Lower Ext    Rt Hip Extension MMT, Gross Movement (4-/5) good minus  -DR    Rt Hip ABduction MMT, Gross Movement (4/5) good  -DR       MMT Left Lower Ext    Lt Hip Extension MMT, Gross Movement (4-/5) good minus  -DR    Lt Hip ABduction MMT, Gross Movement (4/5) good  -DR       Sensation    Sensation WNL? WNL  -DR    Light Touch No apparent deficits  -DR       Flexibility    Flexibility Tested? Lower Extremity  -DR       Lower Extremity  Flexibility    Hamstrings Bilateral:;Mildly limited  -DR    Hip Flexors Bilateral:;WNL  -DR    Hip External Rotators Bilateral:;WNL  -DR    Hip Internal Rotators Bilateral:;Severely limited  -DR              User Key  (r) = Recorded By, (t) = Taken By, (c) = Cosigned By      Initials Name Provider Type    Jose C Otero, PT Physical Therapist                                Therapy Education  Education Details: Educated on PT role and POC; discussed expectations/timeframe for healing. Educated on imaging results, anatomy and physiology of nerves related to lumbar spine. Provided HEP, Access Code: [email or web address]  Given: HEP, Symptoms/condition management, Pain management, Posture/body mechanics  Program: New  How Provided: Verbal, Demonstration, Written  Provided to: Patient  Level of Understanding: Teach back education performed, Verbalized, Demonstrated      PT OP Goals       Row Name 12/18/24 0800          PT Short Term Goals    STG Date to Achieve 01/17/25  -     STG 1 Pt. will be independent with initial HEP to improve self-management of condition.  -     STG 1 Progress New  -     STG 2 Patient will demonstrate proper log roll mechanics with little to no cues when getting on/off treatment table to demonstrate improved mechanics and decreased strain on lumbar spine.  -     STG 2 Progress New  -     STG 3 Pt will improve lumbar AROM (specifically L lateral flexion and L rotation) with no radiating symptoms down LLE for improved mobility and participation in recreational activities.  -     STG 3 Progress New  -     STG 4 Patient will improve laying tolerance from 30 min to >2 hours without radiating symptoms down LLE to L calf to improve quality of sleep.  -     STG 4 Progress New  -     STG 5 --  -     STG 5 Progress --  -DR        Long Term Goals    LTG Date to Achieve 02/16/25  -     LTG 1 Pt. will be independent with advanced HEP to improve long term independence with self management of  condition.  -     LTG 1 Progress New  ALECIA     LTG 2 Pt. will score </= 6/50 on Modified Oswestry to indicate improved perception of disability.  -DR ZULETAG 2 Progress New  ALECIA     LTG 3 Pt. will demonstrate proper TrA engagement in standing and dynamic movements to improve lumbopelvic stability.  -DR ZULETAG 3 Progress New  -DR     LTG 4 Patient will improve ability to sleep through the night in his bed (vs recliner at original appt) without sleep disturbances related to back pain to improve overall sleep quality and quality of life.  -DR ZULETAG 4 Progress New  ALECIA     LTG 5 Pt will return to previous frequency of swimming, >/= 15 laps in the pool, with no radiating symptoms down LLE to assist with greater participation in recreational activities.  -DR ZULETAG 5 Progress New  -DR        Time Calculation    PT Goal Re-Cert Due Date 03/18/25  -               User Key  (r) = Recorded By, (t) = Taken By, (c) = Cosigned By      Initials Name Provider Type    Jose C Otero, PT Physical Therapist                     PT Assessment/Plan       Row Name 12/18/24 0800          PT Assessment    Functional Limitations Impaired locomotion;Limitations in community activities;Performance in leisure activities;Performance in self-care ADL;Impaired gait;Decreased safety during functional activities;Limitations in functional capacity and performance  -DR     Impairments Impaired flexibility;Posture;Poor body mechanics;Pain;Muscle strength;Locomotion;Range of motion;Gait;Joint mobility;Sensation;Peripheral nerve integrity  -DR     Assessment Comments Yunior Anguiano is a 79 y.o. year-old male referred to physical therapy for LBP with L-sided radiculopathy. He presents with a stable clinical presentation. MRI and x-ray completed, showing severe lumbar arthritis, L4-5 central canal stenosis, mild disc bulge and grade 1 anterolisthesis L4 on L5. Associated symptoms include (+) N/T down B LE (L>R) when symptoms are flared.  Main N/T located to L calf into achilles. Pertinent comorbidities and personal factors that may affect progress in the plan of care include, but are not limited to, HTN, imaging, chronicity of symptoms with learned compensatory habits, active lifestyle, and wife's caregiver. Pt is very active, participating in swimming, golfing, cooking; limited in his ability to complete the intensity of swimming he was doing previously. Self scored disability measure of DARRIN was a 13/50, where 0 is no perceived disability. He demonstrated impaired postural alignment, negative Slump neural tension test bilaterally, mild positive result of SLR on LLE, impaired hip girdle strength, reproduction of L achilles/calf N/T with L lateral flexion and L rotation, limitations in lumbar AROM, negative FABERs test bilaterally, guarded/taut lumbosacral segments with no pain, hypomobile accessory motions of segments of lumbar spine, and impaired B LE flexibility. Signs and symptoms are consistent with referring diagnosis. He is appropriate for skilled therapy services at this time to address deficits and improve participation with recreational activities.  -DR     Please refer to paper survey for additional self-reported information No  -DR     Rehab Potential Good  -DR     Patient/caregiver participated in establishment of treatment plan and goals Yes  -DR     Patient would benefit from skilled therapy intervention Yes  -DR        PT Plan    PT Frequency 2x/week  -     Predicted Duration of Therapy Intervention (PT) 8 visits  -     Planned CPT's? PT EVAL LOW COMPLEXITY: 69980;PT RE-EVAL: 91204;PT THER PROC EA 15 MIN: 62424;PT THER ACT EA 15 MIN: 46283;PT MANUAL THERAPY EA 15 MIN: 10288;PT NEUROMUSC RE-EDUCATION EA 15 MIN: 87678;PT GAIT TRAINING EA 15 MIN: 29304;PT SELF CARE/HOME MGMT/TRAIN EA 15: 41348;PT HOT OR COLD PACK TREAT MCARE;PT ELECTRICAL STIM UNATTEND: ;PT ELECTRICAL STIM ATTD EA 15 MIN: 05692;PT ULTRASOUND EA 15 MIN: 31872;PT  TRACTION LUMBAR: 57609  -DR     PT Plan Comments assess pt tolerance to initial HEP. begin on nustep, review HEP. consider adding sciatic n glides, PPT, low range bridges, s/l clamshells, AR press?  -DR               User Key  (r) = Recorded By, (t) = Taken By, (c) = Cosigned By      Initials Name Provider Type    Jose C Otero, PT Physical Therapist                       OP Exercises       Row Name 12/18/24 0800             Subjective    Subjective Comments eval  -DR         Total Minutes    62496 - PT Therapeutic Exercise Minutes 8  -DR         Exercise 1    Exercise Name 1 nustep  -DR      Additional Comments next visit  -DR         Exercise 2    Exercise Name 2 seated HS str  -DR      Cueing 2 Verbal;Demo  -DR      Reps 2 3  -DR      Time 2 20s  -DR         Exercise 3    Exercise Name 3 supine piriformis str/figure 4 str  -DR      Cueing 3 Verbal;Demo  -DR      Reps 3 3e  -DR      Time 3 20s  -DR         Exercise 4    Exercise Name 4 LTR  -DR      Cueing 4 Verbal;Demo  -DR      Reps 4 15  -DR                User Key  (r) = Recorded By, (t) = Taken By, (c) = Cosigned By      Initials Name Provider Type    Jose C Otero, PT Physical Therapist                                  Outcome Measure Options: Modified Oswestry  Modified Oswestry  Modified Oswestry Score/Comments: 13/5= 26%      Time Calculation:     Start Time: 0815  Stop Time: 0900  Time Calculation (min): 45 min  Timed Charges  92975 - PT Therapeutic Exercise Minutes: 8  Total Minutes  Timed Charges Total Minutes: 8   Total Minutes: 8     Therapy Charges for Today       Code Description Service Date Service Provider Modifiers Qty    06775439088 HC PT THER PROC EA 15 MIN 12/18/2024 Jose C Monaco, PT GP 1    09967488929 HC PT EVAL LOW COMPLEXITY 2 12/18/2024 Jose C Monaco, PT GP 1            PT G-Codes  Outcome Measure Options: Modified Oswestry  Total: (Patient-Rptd) (P) 51  Modified Oswestry Score/Comments: 13/5= 26%         Jose C  Carlos Enrique, PT  12/18/2024

## 2024-12-20 ENCOUNTER — HOSPITAL ENCOUNTER (OUTPATIENT)
Dept: PHYSICAL THERAPY | Facility: HOSPITAL | Age: 79
Setting detail: THERAPIES SERIES
Discharge: HOME | End: 2024-12-20
Payer: MEDICARE

## 2024-12-20 DIAGNOSIS — G89.29 CHRONIC MIDLINE LOW BACK PAIN WITH LEFT-SIDED SCIATICA: Primary | ICD-10-CM

## 2024-12-20 DIAGNOSIS — M79.662 PAIN OF LEFT CALF: ICD-10-CM

## 2024-12-20 DIAGNOSIS — M54.42 CHRONIC MIDLINE LOW BACK PAIN WITH LEFT-SIDED SCIATICA: Primary | ICD-10-CM

## 2024-12-20 PROCEDURE — 97110 THERAPEUTIC EXERCISES: CPT

## 2024-12-20 PROCEDURE — 97140 MANUAL THERAPY 1/> REGIONS: CPT

## 2024-12-20 NOTE — THERAPY TREATMENT NOTE
Outpatient Physical Therapy Ortho Treatment Note  [facility]     Patient Name: Yunior Anguiano  : 1945  MRN: [id number]  Today's Date: 2024      Visit Date: 2024    Visit Dx:    ICD-10-CM ICD-9-CM   1. Chronic midline low back pain with left-sided sciatica  M54.42 724.2    G89.29 724.3     338.29   2. Pain of left calf  M79.662 729.5       Patient Active Problem List   Diagnosis    Primary osteoarthritis of knee    OA (osteoarthritis) of knee        Past Medical History:   Diagnosis Date    Arthritis     LEFT KNEE-GETS STIFF AFTER SITTING AND UNSTABLE GETTING UP FROM SITTING POSITION    Cardiac disease     Cardiac murmur     CTS (carpal tunnel syndrome) 2016    Surgery 2018    Fracture, foot 2013    History of chest pain     History of fractured pelvis     History of pancreatitis     Hypertension     Knee swelling 1968    Low back strain 2018    PT resolved it    Sleep apnea     Tear of meniscus of knee 1968        Past Surgical History:   Procedure Laterality Date    CARPAL TUNNEL RELEASE Right 2018    CATARACT EXTRACTION, BILATERAL      CHOLECYSTECTOMY  2014    CORONARY STENT PLACEMENT  2015    mid LAD    FOOT SURGERY Left     bone growth removal    HAND SURGERY  2018    Carpal tunnel    HEMORRHOIDECTOMY      JOINT REPLACEMENT Right 2003    knee    KNEE SURGERY Right     ACL repair    RETINAL DETACHMENT REPAIR Right     TOTAL KNEE ARTHROPLASTY Left 3/6/2020    Procedure: TOTAL KNEE ARTHROPLASTY;  Surgeon: Obed Ramos MD;  Location: [facility];  Service: Orthopedics;  Laterality: Left;    TRIGGER POINT INJECTION  Fo get                         PT Assessment/Plan       Row Name 24 0731          PT Assessment    Assessment Comments Pt returns for first follow up since evaluation reporting inc symptoms following evaluation resulting in dec tolerance to walking. He has recovered today. Pt demonstrates obvious scoliotic curve with R convexity resulting in L lateral  flexion in stance. Trial of several neural tension/flossing exercises with fair tolerance. Pt with inc calf pain with attempted HS stretch, adjusted ankle position to reduce. Inc calf pain with lateral doorway stretch to the R most likely due to inc tension as well. Firm palpation of medial L HS did reproduce mild symptoms in calf, therefore, added STM/TPR to address. In order to tolerate manual therapy pt was placed over bolster and 3 pillows to reduce lumbar extension in prone position. This was tolerated well for short duration. Pt reports no tingling/pain at close of session.  -LB        PT Plan    PT Plan Comments continue to address inc L neural tension, consider standing hip extension, lateral/monster walk, DDN to HS if appropriate  -LB               User Key  (r) = Recorded By, (t) = Taken By, (c) = Cosigned By      Initials Name Provider Type    LB Ruth Crowe, PT Physical Therapist                       OP Exercises       Row Name 12/20/24 1100 12/20/24 0700          Subjective    Subjective Comments -- I am doing ok. I couldn't walk when I left the other day. I just can't tolerate lying flat on my back.  -LB        Subjective Pain    Able to rate subjective pain? -- yes  -LB     Pre-Treatment Pain Level -- 2  -LB     Post-Treatment Pain Level -- 0  -LB        Total Minutes    86669 - PT Therapeutic Exercise Minutes -- 30  -LB     46928 - PT Manual Therapy Minutes 15  -LB --        Exercise 1    Exercise Name 1 -- held due to discussion regarding response to last treatment  -LB     Time 1 -- 5 minutes  -LB        Exercise 2    Exercise Name 2 -- seated HS str  -LB     Cueing 2 -- Verbal;Demo  -LB     Reps 2 -- 3  -LB     Time 2 -- 20s  -LB     Additional Comments -- modified to reduce neural tension  -LB        Exercise 3    Exercise Name 3 -- seated piriformis stretch/figure 4  -LB     Cueing 3 -- Verbal;Demo  -LB     Reps 3 -- 3e  -LB     Time 3 -- 20s  -LB        Exercise 4    Exercise Name 4 -- AR  press  -LB     Cueing 4 -- Verbal;Demo  -LB     Sets 4 -- 2  -LB     Reps 4 -- 10  -LB     Time 4 -- RTB  -LB        Exercise 5    Exercise Name 5 -- seated gentle sciatic nerve glide  -LB     Sets 5 -- 3  -LB     Reps 5 -- 10  -LB     Time 5 -- avoiding max tension at any joint  -LB        Exercise 6    Exercise Name 6 -- seated PPT  -LB     Sets 6 -- 2  -LB     Reps 6 -- 10  -LB     Time 6 -- 5  -LB     Additional Comments -- VCs/tactile cues  -LB        Exercise 7    Exercise Name 7 -- standing TrA  -LB     Sets 7 -- 2  -LB     Reps 7 -- 10  -LB     Time 7 -- 5  -LB        Exercise 8    Exercise Name 8 -- attempted lateral doorway stretch  -LB     Sets 8 -- --  -LB     Reps 8 -- --  -LB     Time 8 -- --  -LB     Additional Comments -- inc tingling/calf pain  -LB               User Key  (r) = Recorded By, (t) = Taken By, (c) = Cosigned By      Initials Name Provider Type    Ruth Verma, PT Physical Therapist                             Manual Rx (Last 36 Hours)       Manual Treatments       Row Name 12/20/24 1100             Total Minutes    71224 - PT Manual Therapy Minutes 15  -LB         Manual Rx 1    Manual Rx 1 Location L medial HS  -LB      Manual Rx 1 Type STM, TPR using manual pressure and pool noodle  -LB      Manual Rx 1 Duration 15  -LB                User Key  (r) = Recorded By, (t) = Taken By, (c) = Cosigned By      Initials Name Provider Type    Ruth Verma PT Physical Therapist                     PT OP Goals       Row Name 12/20/24 0700          PT Short Term Goals    STG Date to Achieve 01/17/25  -LB     STG 1 Pt. will be independent with initial HEP to improve self-management of condition.  -LB     STG 1 Progress Met  -LB     STG 2 Patient will demonstrate proper log roll mechanics with little to no cues when getting on/off treatment table to demonstrate improved mechanics and decreased strain on lumbar spine.  -LB     STG 2 Progress Met  -LB     STG 3 Pt will improve lumbar AROM  (specifically L lateral flexion and L rotation) with no radiating symptoms down LLE for improved mobility and participation in recreational activities.  -LB     STG 3 Progress Ongoing  -LB     STG 4 Patient will improve laying tolerance from 30 min to >2 hours without radiating symptoms down LLE to L calf to improve quality of sleep.  -LB     STG 4 Progress Ongoing  -LB        Long Term Goals    LTG Date to Achieve 02/16/25  -LB     LTG 1 Pt. will be independent with advanced HEP to improve long term independence with self management of condition.  -LB     LTG 1 Progress Ongoing  -LB     LTG 2 Pt. will score </= 6/50 on Modified Oswestry to indicate improved perception of disability.  -LB     LTG 2 Progress Ongoing  -LB     LTG 3 Pt. will demonstrate proper TrA engagement in standing and dynamic movements to improve lumbopelvic stability.  -LB     LTG 3 Progress Ongoing  -LB     LTG 4 Patient will improve ability to sleep through the night in his bed (vs recliner at original appt) without sleep disturbances related to back pain to improve overall sleep quality and quality of life.  -LB     LTG 4 Progress Ongoing  -LB     LTG 5 Pt will return to previous frequency of swimming, >/= 15 laps in the pool, with no radiating symptoms down LLE to assist with greater participation in recreational activities.  -LB     LTG 5 Progress Ongoing  -LB               User Key  (r) = Recorded By, (t) = Taken By, (c) = Cosigned By      Initials Name Provider Type    Ruth Verma, PT Physical Therapist                    Therapy Education  Education Details: modified HEP to reduce tingling/calf pain, avoided supine position, discussed nerve gliding, timeline of healing  Given: HEP, Symptoms/condition management, Pain management, Posture/body mechanics  Program: Reinforced, Progressed  How Provided: Verbal, Demonstration, Written  Provided to: Patient  Level of Understanding: Teach back education performed, Verbalized,  Demonstrated              Time Calculation:   Start Time: 0745  Stop Time: 0830  Time Calculation (min): 45 min  Total Timed Code Minutes- PT: 40 minute(s)  Timed Charges  75905 - PT Therapeutic Exercise Minutes: 30  37350 - PT Manual Therapy Minutes: 15  Total Minutes  Timed Charges Total Minutes: 15   Total Minutes: 15  Therapy Charges for Today       Code Description Service Date Service Provider Modifiers Qty    86468947202 HC PT MANUAL THERAPY EA 15 MIN 12/20/2024 Ruth Crowe, PT GP 1    08235153738 HC PT THER PROC EA 15 MIN 12/20/2024 Ruth Crowe PT GP 2                      Ruth Crowe, PT  12/20/2024

## 2024-12-30 ENCOUNTER — HOSPITAL ENCOUNTER (OUTPATIENT)
Dept: PHYSICAL THERAPY | Facility: HOSPITAL | Age: 79
Setting detail: THERAPIES SERIES
Discharge: HOME | End: 2024-12-30
Payer: MEDICARE

## 2024-12-30 DIAGNOSIS — M79.662 PAIN OF LEFT CALF: ICD-10-CM

## 2024-12-30 DIAGNOSIS — G89.29 CHRONIC MIDLINE LOW BACK PAIN WITH LEFT-SIDED SCIATICA: Primary | ICD-10-CM

## 2024-12-30 DIAGNOSIS — M54.42 CHRONIC MIDLINE LOW BACK PAIN WITH LEFT-SIDED SCIATICA: Primary | ICD-10-CM

## 2024-12-30 PROCEDURE — 97110 THERAPEUTIC EXERCISES: CPT

## 2024-12-30 PROCEDURE — 97140 MANUAL THERAPY 1/> REGIONS: CPT

## 2024-12-30 NOTE — THERAPY TREATMENT NOTE
Outpatient Physical Therapy Ortho Treatment Note  [facility]     Patient Name: Yunior Anguiano  : 1945  MRN: [id number]  Today's Date: 2024      Visit Date: 2024    Visit Dx:    ICD-10-CM ICD-9-CM   1. Chronic midline low back pain with left-sided sciatica  M54.42 724.2    G89.29 724.3     338.29   2. Pain of left calf  M79.662 729.5       Patient Active Problem List   Diagnosis    Primary osteoarthritis of knee    OA (osteoarthritis) of knee        Past Medical History:   Diagnosis Date    Arthritis     LEFT KNEE-GETS STIFF AFTER SITTING AND UNSTABLE GETTING UP FROM SITTING POSITION    Cardiac disease     Cardiac murmur     CTS (carpal tunnel syndrome) 2016    Surgery 2018    Fracture, foot 2013    History of chest pain     History of fractured pelvis     History of pancreatitis     Hypertension     Knee swelling 1968    Low back strain 2018    PT resolved it    Sleep apnea     Tear of meniscus of knee         Past Surgical History:   Procedure Laterality Date    CARPAL TUNNEL RELEASE Right 2018    CATARACT EXTRACTION, BILATERAL      CHOLECYSTECTOMY  2014    CORONARY STENT PLACEMENT  2015    mid LAD    FOOT SURGERY Left     bone growth removal    HAND SURGERY  2018    Carpal tunnel    HEMORRHOIDECTOMY      JOINT REPLACEMENT Right 2003    knee    KNEE SURGERY Right     ACL repair    RETINAL DETACHMENT REPAIR Right     TOTAL KNEE ARTHROPLASTY Left 3/6/2020    Procedure: TOTAL KNEE ARTHROPLASTY;  Surgeon: Obed Ramos MD;  Location: [facility];  Service: Orthopedics;  Laterality: Left;    TRIGGER POINT INJECTION  Fo get                         PT Assessment/Plan       Row Name 24 0900          PT Assessment    Assessment Comments Mr. Anguiano arrives to PT reporting ongoing LLE N/T into lateral gastroc/foot and relatively no change since start of PT. His primary complaint continues to involved inability to lay flat when sleeping forcing him to sleep in recliner.  Elected to perform STM to L medial HS and patient tolerated R side-lying position with pillow between knees for ~ 10 minutes without onset of pain. He did demonstrate improvement in nerve pain following seated sciatic nerve flossing between sets of AR press. He is scheduled to undergo CHANO on 1/2/24. No changes made to HEP and encouraged compliance with seated nerve flossing. Yunior Anguiano remains a candidate for skilled PT.  -JS        PT Plan    PT Plan Comments response to CHANO, consider alt shoulder ext, lateral stepping, standing hip ext, lateral lunge with lateral trunk lean  -JS               User Key  (r) = Recorded By, (t) = Taken By, (c) = Cosigned By      Initials Name Provider Type    Rena Rodriguez, PT Physical Therapist                       OP Exercises       Row Name 12/30/24 0800             Subjective    Subjective Comments I was feeling really good coming in here today and then my leg started to tingle  -JS         Subjective Pain    Able to rate subjective pain? yes  -JS      Pre-Treatment Pain Level 2  -JS         Total Minutes    59498 - PT Therapeutic Exercise Minutes 30  -JS      10505 - PT Manual Therapy Minutes 10  -JS         Exercise 1    Exercise Name 1 nustep  -JS      Time 1 5 mins  -JS         Exercise 2    Exercise Name 2 seated HS str  -JS      Cueing 2 Verbal;Demo  -JS      Reps 2 3  -JS      Time 2 20s  -JS         Exercise 3    Exercise Name 3 seated piriformis stretch/figure 4  -JS      Cueing 3 Verbal;Demo  -JS      Reps 3 3e  -JS      Time 3 20s  -JS         Exercise 4    Exercise Name 4 AR press  -JS      Cueing 4 Verbal;Demo  -JS      Sets 4 2  -JS      Reps 4 10  -JS      Time 4 RTB  -JS      Additional Comments staggered stance, nerve glide between sets  -JS         Exercise 5    Exercise Name 5 seated gentle sciatic nerve glide  -JS      Sets 5 3  -JS      Reps 5 10  -JS      Time 5 avoiding max tension at any joint  -JS      Additional Comments cues for upright  posture  -JS         Exercise 6    Exercise Name 6 seated PPT  -JS      Sets 6 1  -JS      Reps 6 10  -JS      Time 6 5  -JS         Exercise 7    Exercise Name 7 standing TrA  -JS      Sets 7 1  -JS      Reps 7 10  -JS      Time 7 5  -JS                User Key  (r) = Recorded By, (t) = Taken By, (c) = Cosigned By      Initials Name Provider Type    JS Rena Schneider, PT Physical Therapist                             Manual Rx (Last 36 Hours)       Manual Treatments       Row Name 12/30/24 0800             Total Minutes    38607 - PT Manual Therapy Minutes 10  -JS         Manual Rx 1    Manual Rx 1 Location L medial HS  -JS      Manual Rx 1 Type STM, TPR using manual pressure  -JS      Manual Rx 1 Grade R SLing position  -JS      Manual Rx 1 Duration 10  -JS                User Key  (r) = Recorded By, (t) = Taken By, (c) = Cosigned By      Initials Name Provider Type    Rena Rodriguez, PT Physical Therapist                     PT OP Goals       Row Name 12/30/24 0800          PT Short Term Goals    STG Date to Achieve 01/17/25  -JS     STG 1 Pt. will be independent with initial HEP to improve self-management of condition.  -JS     STG 1 Progress Met  -JS     STG 2 Patient will demonstrate proper log roll mechanics with little to no cues when getting on/off treatment table to demonstrate improved mechanics and decreased strain on lumbar spine.  -JS     STG 2 Progress Met  -JS     STG 3 Pt will improve lumbar AROM (specifically L lateral flexion and L rotation) with no radiating symptoms down LLE for improved mobility and participation in recreational activities.  -JS     STG 3 Progress Ongoing  -JS     STG 4 Patient will improve laying tolerance from 30 min to >2 hours without radiating symptoms down LLE to L calf to improve quality of sleep.  -JS     STG 4 Progress Ongoing  -JS        Long Term Goals    LTG Date to Achieve 02/16/25  -JS     LTG 1 Pt. will be independent with advanced HEP to improve long  term independence with self management of condition.  -JS     LTG 1 Progress Ongoing  -JS     LTG 2 Pt. will score </= 6/50 on Modified Oswestry to indicate improved perception of disability.  -JS     LTG 2 Progress Ongoing  -JS     LTG 3 Pt. will demonstrate proper TrA engagement in standing and dynamic movements to improve lumbopelvic stability.  -JS     LTG 3 Progress Ongoing  -JS     LTG 4 Patient will improve ability to sleep through the night in his bed (vs recliner at original appt) without sleep disturbances related to back pain to improve overall sleep quality and quality of life.  -JS     LTG 4 Progress Ongoing  -JS     LTG 5 Pt will return to previous frequency of swimming, >/= 15 laps in the pool, with no radiating symptoms down LLE to assist with greater participation in recreational activities.  -JS     LTG 5 Progress Ongoing  -JS               User Key  (r) = Recorded By, (t) = Taken By, (c) = Cosigned By      Initials Name Provider Type    Rena Rodriguez, PT Physical Therapist                                   Time Calculation:   Start Time: 0835  Stop Time: 0915  Time Calculation (min): 40 min  Timed Charges  72709 - PT Therapeutic Exercise Minutes: 30  36810 - PT Manual Therapy Minutes: 10  Total Minutes  Timed Charges Total Minutes: 40   Total Minutes: 40  Therapy Charges for Today       Code Description Service Date Service Provider Modifiers Qty    [id number]  PT THER PROC EA 15 MIN 12/30/2024 Rena Schneider, PT GP 2    [id number]  PT MANUAL THERAPY EA 15 MIN 12/30/2024 Rena Schneider, PT GP 1                      Rena Schneider PT  12/30/2024

## 2025-01-02 ENCOUNTER — ANESTHESIA EVENT (OUTPATIENT)
Dept: PAIN MEDICINE | Facility: HOSPITAL | Age: 80
End: 2025-01-02
Payer: MEDICARE

## 2025-01-02 ENCOUNTER — HOSPITAL ENCOUNTER (OUTPATIENT)
Dept: PAIN MEDICINE | Facility: HOSPITAL | Age: 80
Discharge: HOME | End: 2025-01-02
Payer: MEDICARE

## 2025-01-02 ENCOUNTER — ANESTHESIA (OUTPATIENT)
Dept: PAIN MEDICINE | Facility: HOSPITAL | Age: 80
End: 2025-01-02
Payer: MEDICARE

## 2025-01-02 ENCOUNTER — HOSPITAL ENCOUNTER (OUTPATIENT)
Dept: GENERAL RADIOLOGY | Facility: HOSPITAL | Age: 80
Discharge: HOME | End: 2025-01-02
Payer: MEDICARE

## 2025-01-02 VITALS
WEIGHT: 190 LBS | RESPIRATION RATE: 16 BRPM | HEIGHT: 69 IN | SYSTOLIC BLOOD PRESSURE: 154 MMHG | BODY MASS INDEX: 28.14 KG/M2 | OXYGEN SATURATION: 99 % | DIASTOLIC BLOOD PRESSURE: 97 MMHG | HEART RATE: 62 BPM | TEMPERATURE: 97.8 F

## 2025-01-02 DIAGNOSIS — R52 PAIN: ICD-10-CM

## 2025-01-02 DIAGNOSIS — M54.16 LUMBAR RADICULOPATHY: Primary | ICD-10-CM

## 2025-01-02 PROCEDURE — 25510000001 IOPAMIDOL 41 % SOLUTION: Performed by: ANESTHESIOLOGY

## 2025-01-02 PROCEDURE — 77003 FLUOROGUIDE FOR SPINE INJECT: CPT

## 2025-01-02 PROCEDURE — 25010000002 METHYLPREDNISOLONE PER 80 MG: Performed by: ANESTHESIOLOGY

## 2025-01-02 RX ORDER — VITAMIN A, ASCORBIC ACID, CHOLECALCIFEROL, ALPHA-TOCOPHEROL ACETATE, THIAMINE HYDROCHLORIDE, RIBOFLAVIN 5-PHOSPHATE SODIUM, NIACINAMIDE, PYRIDOXINE HYDROCHLORIDE, FERROUS SULFATE AND SODIUM FLUORIDE 1500; 35; 400; 5; .5; .6; 8; .4; 10; .25 [IU]/ML; MG/ML; [IU]/ML; [IU]/ML; MG/ML; MG/ML; MG/ML; MG/ML; MG/ML; MG/ML
LIQUID ORAL
COMMUNITY

## 2025-01-02 RX ORDER — MIDAZOLAM HYDROCHLORIDE 1 MG/ML
1 INJECTION, SOLUTION INTRAMUSCULAR; INTRAVENOUS ONCE AS NEEDED
Status: DISCONTINUED | OUTPATIENT
Start: 2025-01-02 | End: 2025-01-03 | Stop reason: HOSPADM

## 2025-01-02 RX ORDER — LIDOCAINE HYDROCHLORIDE 10 MG/ML
1 INJECTION, SOLUTION INFILTRATION; PERINEURAL ONCE
Status: DISCONTINUED | OUTPATIENT
Start: 2025-01-02 | End: 2025-01-03 | Stop reason: HOSPADM

## 2025-01-02 RX ORDER — METHYLPREDNISOLONE ACETATE 80 MG/ML
80 INJECTION, SUSPENSION INTRA-ARTICULAR; INTRALESIONAL; INTRAMUSCULAR; SOFT TISSUE ONCE
Status: COMPLETED | OUTPATIENT
Start: 2025-01-02 | End: 2025-01-02

## 2025-01-02 RX ORDER — FENTANYL CITRATE 50 UG/ML
50 INJECTION, SOLUTION INTRAMUSCULAR; INTRAVENOUS ONCE
Status: DISCONTINUED | OUTPATIENT
Start: 2025-01-02 | End: 2025-01-03 | Stop reason: HOSPADM

## 2025-01-02 RX ORDER — METHYLPREDNISOLONE ACETATE 80 MG/ML
80 INJECTION, SUSPENSION INTRA-ARTICULAR; INTRALESIONAL; INTRAMUSCULAR; SOFT TISSUE ONCE
Status: DISCONTINUED | OUTPATIENT
Start: 2025-01-02 | End: 2025-01-03 | Stop reason: HOSPADM

## 2025-01-02 RX ORDER — IOPAMIDOL 408 MG/ML
10 INJECTION, SOLUTION INTRATHECAL
Status: COMPLETED | OUTPATIENT
Start: 2025-01-02 | End: 2025-01-02

## 2025-01-02 RX ADMIN — IOPAMIDOL 10 ML: 408 INJECTION, SOLUTION INTRATHECAL at 13:51

## 2025-01-02 RX ADMIN — METHYLPREDNISOLONE ACETATE 80 MG: 80 INJECTION, SUSPENSION INTRA-ARTICULAR; INTRALESIONAL; INTRAMUSCULAR; SOFT TISSUE at 13:51

## 2025-01-02 NOTE — DISCHARGE INSTRUCTIONS
EPIDURAL STEROID INJECTION          An epidural steroid injection is a shot of steroid medicine and numbing medicine that is given into the space between the spinal cord and the bones of the back (epidural space).  The injection helps relieve pain by an irritated or swollen nerve root.    TELL YOUR HEALTH CARE PROVIDER ABOUT:  Any allergies you have  All medicines you are taking including any over the counter medicines  Any blood disorders you have  Any surgeries you have had  Any medical conditions you have  Whether you are pregnant or may be pregnant    WHAT ARE THE RISK?  Generally, this is a safe procedure. However,problems may occur, including  Headache  Bleeding  Infection  Allergic Reaction  Nerve Damage    WHAT CAN I EXPECT AFTER THE PROCEDURE?    INJECTION SITE  Remove the Band-Aid/s after 24 hours  Check your injection site every day for signs of infection.  Check for:             Redness             Bleeding (small amt is normal)             Warmth             Pus or bad odor  Some numbness may be experienced for several hours following the procedure.  Avoid using heat on the injection site for 24 hours. You may use ice intermittently if needed by placing a         towel between your skin and the ice bag and using the ice for 20 minutes 2-3 times a day.  Do not take baths, swim or use a hot tub for 24 hours.    ACTIVITY  No strenuous activity for 24 hours then return to normal activity as tolerated.  If your leg is numb, no driving until full sensation and strength has returned.    GENERAL INSTRUCTIONS:  The injection site may feel numb, use ice with caution if numbness is present and no heat for 24 hours or until numbness is gone.   If you have numbness or weakness in your arm or leg, use those areas with caution until normal sensation returns.  It is not uncommon to notice an increase in discomfort or a change in the location of discomfort for 3-4 days after the procedure.  If discomfort is noticed  at the injection site, ice may be            applied to that area for 20 min 2-3 times a day.  Take the pain medicine your physician has prescribed or over the counter pain relievers as long as you do not have any contraindications.  If you are a diabetic, monitor your blood sugar closely.  The steroids used in your procedure may increase your blood sugar level up to 36 hours after the injection.  If your blood sugar is greater than 250, call the physician that helps you monitor your blood sugar.  Keep all follow-up visits as scheduled by your health care provider. This is important.    CONTACT OUR OFFICE IF:  You have any of these signs of infection            -Redness, swelling, or warmth around your injection site.            -Fluid or blood coming from your injection site (small amt of blood is normal)            -Pus or a bad odor from your injection site            -A fever  You develop a severe headache or a stiff neck  You lose control of your bladder or bowel movements      PAIN MANAGEMENT CENTER HOURS   Monday-Friday 7:30 am. - 4:00 pm.  For any problem related to your procedure we can be reached at [phone].  If you experience an emergency with your procedure, call [phone] or go to the emergency room.      What is [employer]?  [employer] is a fitness and wellness program offered at no additional cost to seniors 65+ on eligible Medicare plans that helps you get active, get fit, and connect with others.  The program is designed for all levels and abilities and provides access to online and in-person classes, over 15,000 fitness locations, and health & wellness discounts.  Before starting any exercise program, consult with your primary care provider.  For additional information and to check eligibility:  [email or web address]                      EPIDURAL STEROID INJECTION          An epidural steroid injection is a shot of steroid medicine and numbing medicine that is given into the  space between the spinal cord and the bones of the back (epidural space).  The injection helps relieve pain by an irritated or swollen nerve root.    TELL YOUR HEALTH CARE PROVIDER ABOUT:  Any allergies you have  All medicines you are taking including any over the counter medicines  Any blood disorders you have  Any surgeries you have had  Any medical conditions you have  Whether you are pregnant or may be pregnant    WHAT ARE THE RISK?  Generally, this is a safe procedure. However,problems may occur, including  Headache  Bleeding  Infection  Allergic Reaction  Nerve Damage    WHAT CAN I EXPECT AFTER THE PROCEDURE?    INJECTION SITE  Remove the Band-Aid/s after 24 hours  Check your injection site every day for signs of infection.  Check for:             Redness             Bleeding (small amt is normal)             Warmth             Pus or bad odor  Some numbness may be experienced for several hours following the procedure.  Avoid using heat on the injection site for 24 hours. You may use ice intermittently if needed by placing a         towel between your skin and the ice bag and using the ice for 20 minutes 2-3 times a day.  Do not take baths, swim or use a hot tub for 24 hours.    ACTIVITY  No strenuous activity for 24 hours then return to normal activity as tolerated.  If your leg is numb, no driving until full sensation and strength has returned.    GENERAL INSTRUCTIONS:  The injection site may feel numb, use ice with caution if numbness is present and no heat for 24 hours or until numbness is gone.   If you have numbness or weakness in your arm or leg, use those areas with caution until normal sensation returns.  It is not uncommon to notice an increase in discomfort or a change in the location of discomfort for 3-4 days after the procedure.  If discomfort is noticed at the injection site, ice may be            applied to that area for 20 min 2-3 times a day.  Take the pain medicine your physician has  prescribed or over the counter pain relievers as long as you do not have any contraindications.  If you are a diabetic, monitor your blood sugar closely.  The steroids used in your procedure may increase your blood sugar level up to 36 hours after the injection.  If your blood sugar is greater than 250, call the physician that helps you monitor your blood sugar.  Keep all follow-up visits as scheduled by your health care provider. This is important.    CONTACT OUR OFFICE IF:  You have any of these signs of infection            -Redness, swelling, or warmth around your injection site.            -Fluid or blood coming from your injection site (small amt of blood is normal)            -Pus or a bad odor from your injection site            -A fever  You develop a severe headache or a stiff neck  You lose control of your bladder or bowel movements      PAIN MANAGEMENT CENTER HOURS   Monday-Friday 7:30 am. - 4:00 pm.  For any problem related to your procedure we can be reached at [phone].  If you experience an emergency with your procedure, call [phone] or go to the emergency room.      What is [employer]?  [employer] is a fitness and wellness program offered at no additional cost to seniors 65+ on eligible Medicare plans that helps you get active, get fit, and connect with others.  The program is designed for all levels and abilities and provides access to online and in-person classes, over 15,000 fitness locations, and health & wellness discounts.  Before starting any exercise program, consult with your primary care provider.  For additional information and to check eligibility:  [email or web address]

## 2025-01-02 NOTE — H&P
[facility]    History and Physical    Patient Name: Yunior Anguiano  :  1945  MRN:  [id number]  Date of Admission: 2025    Subjective     Patient is a 79 y.o. male presents with chief complaint of chronic, moderate, severe low back and leg: left pain, usually the left calf.  Onset of symptoms was abrupt starting 3 months ago in mid october.  Symptoms are associated/aggravated by nothing in particular. Symptoms improve with nothing.  Has been seen by his PCP & referred for LESI which he presents for today.  Conservative therapies tried in interim.        MRI IMPRESSION, for full report see chart:  1.  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 There is no  evidence of a focal herniation. See above.  2.  There is mild enlargement of the infrarenal abdominal aorta. There  is also enlargement of the common iliac arteries bilaterally more  prominent on the left. The left common iliac artery measures  approximately 1.7 cm in diameter on the CT examination of the abdomen  pelvis performed on 3/12/2014, now 2.1 cm in diameter. See above.     This report was finalized on 2024 11:56 AM by Dr. Richy Herr M.D on Workstation: [id number]    The following portions of the patients history were reviewed and updated as appropriate: current medications, allergies, past medical history, past surgical history, past family history, past social history, and problem list                Objective     Past Medical History:   Past Medical History:   Diagnosis Date   • Arthritis     LEFT KNEE-GETS STIFF AFTER SITTING AND UNSTABLE GETTING UP FROM SITTING POSITION   • Cardiac disease    • Cardiac murmur    • CTS (carpal tunnel  syndrome) 2016    Surgery 2018   • Fracture, foot 2013   • History of chest pain    • History of fractured pelvis    • History of pancreatitis    • Hypertension    • Knee swelling 1968   • Low back pain    • Low back strain 2018    PT resolved it   • Sleep apnea    • Tear of meniscus of knee 1968     Past Surgical History:   Past Surgical History:   Procedure Laterality Date   • CARPAL TUNNEL RELEASE Right 2018   • CATARACT EXTRACTION, BILATERAL     • CHOLECYSTECTOMY  2014   • CORONARY STENT PLACEMENT  2015    mid LAD   • FOOT SURGERY Left 1992    bone growth removal   • HAND SURGERY  2018    Carpal tunnel   • HEMORRHOIDECTOMY     • JOINT REPLACEMENT Right 2003    knee   • KNEE SURGERY Right 1968    ACL repair   • RETINAL DETACHMENT REPAIR Right    • TOTAL KNEE ARTHROPLASTY Left 3/6/2020    Procedure: TOTAL KNEE ARTHROPLASTY;  Surgeon: Obed Ramos MD;  Location: [facility] OR;  Service: Orthopedics;  Laterality: Left;   • TRIGGER POINT INJECTION  Fo get 2016     Family History:   Family History   Problem Relation Age of Onset   • Hypertension Mother    • Malig Hyperthermia Neg Hx      Social History:   Social History     Socioeconomic History   • Marital status:    Tobacco Use   • Smoking status: Never     Passive exposure: Never   • Smokeless tobacco: Never   Vaping Use   • Vaping status: Never Used   Substance and Sexual Activity   • Alcohol use: Yes     Alcohol/week: 4.0 standard drinks of alcohol     Types: 3 Cans of beer, 1 Shots of liquor per week     Comment: 2-3 TIMES PER WEEK   • Drug use: No   • Sexual activity: Not Currently       Vital Signs Range for the last 24 hours  Temperature: Temp:  [36.6 °C (97.8 °F)] 36.6 °C (97.8 °F)   Temp Source: Temp src: Infrared   BP: BP: (170)/(97) 170/97   Pulse: Heart Rate:  [68] 68   Respirations: Resp:  [16] 16   SPO2: SpO2:  [100 %] 100 %   O2 Amount (l/min):     O2 Devices Device (Oxygen Therapy): room air   Weight: Weight:  [86.2 kg (190 lb)] 86.2 kg  "(190 lb)     Flowsheet Rows      Flowsheet Row First Filed Value   Admission Height 175.3 cm (69\") Documented at 01/02/2025 1321   Admission Weight 86.2 kg (190 lb) Documented at 01/02/2025 1321            --------------------------------------------------------------------------------    Current Outpatient Medications   Medication Sig Dispense Refill   • aspirin 81 MG EC tablet Take 1 tablet by mouth Daily.     • hydrALAZINE (APRESOLINE) 10 MG tablet Take 1 tablet by mouth 3 (Three) Times a Day.     • Ped Multivitamins-Fl-Iron (Multi-Vitamin/Fluoride/Iron) 0.25-10 MG/ML solution Take  by mouth.     • ZETIA 10 MG tablet Take 1 tablet by mouth Every Night.  5   • methylPREDNISolone (MEDROL) 4 MG dose pack Use as directed by package instructions 21 tablet 0     Current Facility-Administered Medications   Medication Dose Route Frequency Provider Last Rate Last Admin   • fentaNYL citrate (PF) (SUBLIMAZE) injection 50 mcg  50 mcg Intravenous Once Xiomara He MD       • iopamidol (ISOVUE-M 200) injection 41%  10 mL Epidural Once in imaging Xiomara He MD       • lidocaine (XYLOCAINE) 1 % injection 1 mL  1 mL Intradermal Once Xiomara He MD       • methylPREDNISolone acetate (DEPO-medrol) injection 80 mg  80 mg Epidural Once Xiomara He MD       • midazolam (VERSED) injection 1 mg  1 mg Intravenous Once PRN Xiomara He MD           --------------------------------------------------------------------------------  Assessment & Plan      Anesthesia Evaluation     Patient summary reviewed and Nursing notes reviewed   no history of anesthetic complications:                Airway   Mallampati: II  Dental      Pulmonary    (+) ,sleep apnea  Cardiovascular     (+) hypertension, valvular problems/murmurs murmur      Neuro/Psych  (+) numbness  GI/Hepatic/Renal/Endo - negative ROS     Musculoskeletal     (+) back pain, chronic pain, radiculopathy Left " lower extremity  Abdominal    Substance History - negative use     OB/GYN negative ob/gyn ROS         Other   arthritis,            Diagnosis and Plan    Treatment Plan  ASA 3      Procedures: Lumbar Epidural Steroid Injection(LESI), With fluoroscopy,      Anesthetic plan and risks discussed with patient.      Diagnosis     * Lumbar radiculopathy [M54.16]

## 2025-01-02 NOTE — ANESTHESIA PROCEDURE NOTES
PAIN Epidural block    Pre-sedation assessment completed: 1/2/2025 1:37 PM    Patient reassessed immediately prior to procedure    Patient location during procedure: pain clinic  Start Time: 1/2/2025 1:44 PM  Stop Time: 1/2/2025 1:57 PM  Indication:procedure for pain  Performed By  Anesthesiologist: Xiomara He MD  Preanesthetic Checklist  Completed: patient identified, IV checked, site marked, risks and benefits discussed, surgical consent, monitors and equipment checked, pre-op evaluation and timeout performed  Additional Notes  Fluoro used.    Prep:  Pt Position:prone  Sterile Tech:cap, gloves, mask and sterile barrier  Prep:chlorhexidine gluconate and isopropyl alcohol  Monitoring:blood pressure monitoring, continuous pulse oximetry and EKG  Procedure:Sedation: no     Approach:left paramedian  Guidance: fluoroscopy  Location:lumbar  Level:4-5  Needle Type:Tuohy  Needle Gauge:20  Aspiration:negative  Medications:  Preservative Free Saline:3mL  Isovue:1mL  Comments:Dye spread c/w epidurogram  Dx: lumbar radiculopathyDepomedrol:80  Post Assessment:  Dressing:occlusive dressing applied  Pt Tolerance:patient tolerated the procedure well with no apparent complications  Complications:no

## 2025-01-02 NOTE — NURSING NOTE
Pt. States that his foot is numb. Pt. Can't lift his foot off the floor upon standing. Md notified. Md spoke with pt. Pt. to stay here until numbness wears off.

## 2025-01-03 ENCOUNTER — APPOINTMENT (OUTPATIENT)
Dept: PHYSICAL THERAPY | Facility: HOSPITAL | Age: 80
End: 2025-01-03
Payer: MEDICARE

## 2025-01-03 ENCOUNTER — TELEPHONE (OUTPATIENT)
Dept: PHYSICAL THERAPY | Facility: HOSPITAL | Age: 80
End: 2025-01-03
Payer: MEDICARE

## 2025-01-03 NOTE — TELEPHONE ENCOUNTER
Mr. Anguiano contacted PT office with multiple questions/concerns regarding his current level of LBP and response to CHANO injection he received yesterday 1/3/25 at 1400. Reviewed red flag s/s including but not limited too BB changes, saddle paraesthesias and BLE N/T. Patient denies s/s at this time. Encouraged pt to seek ED treatment if s/s present. Discussed with patient alternative pain relieving techniques and to contact pain management if symptoms progress. He is scheduled to return for skilled PT on 1/7/25.

## 2025-01-07 ENCOUNTER — APPOINTMENT (OUTPATIENT)
Dept: PHYSICAL THERAPY | Facility: HOSPITAL | Age: 80
End: 2025-01-07
Payer: MEDICARE

## 2025-01-10 ENCOUNTER — HOSPITAL ENCOUNTER (OUTPATIENT)
Dept: PHYSICAL THERAPY | Facility: HOSPITAL | Age: 80
Setting detail: THERAPIES SERIES
Discharge: HOME | End: 2025-01-10
Payer: MEDICARE

## 2025-01-10 DIAGNOSIS — G89.29 CHRONIC MIDLINE LOW BACK PAIN WITH LEFT-SIDED SCIATICA: Primary | ICD-10-CM

## 2025-01-10 DIAGNOSIS — M54.42 CHRONIC MIDLINE LOW BACK PAIN WITH LEFT-SIDED SCIATICA: Primary | ICD-10-CM

## 2025-01-10 DIAGNOSIS — M79.662 PAIN OF LEFT CALF: ICD-10-CM

## 2025-01-10 PROCEDURE — 97110 THERAPEUTIC EXERCISES: CPT

## 2025-01-10 NOTE — THERAPY TREATMENT NOTE
Outpatient Physical Therapy Ortho Treatment Note  [facility]     Patient Name: Yunior Anguiano  : 1945  MRN: [id number]  Today's Date: 1/10/2025      Visit Date: 01/10/2025    Visit Dx:    ICD-10-CM ICD-9-CM   1. Chronic midline low back pain with left-sided sciatica  M54.42 724.2    G89.29 724.3     338.29   2. Pain of left calf  M79.662 729.5       Patient Active Problem List   Diagnosis    Primary osteoarthritis of knee    OA (osteoarthritis) of knee        Past Medical History:   Diagnosis Date    Arthritis     LEFT KNEE-GETS STIFF AFTER SITTING AND UNSTABLE GETTING UP FROM SITTING POSITION    Cardiac disease     Cardiac murmur     CTS (carpal tunnel syndrome) 2016    Surgery 2018    Fracture, foot 2013    History of chest pain     History of fractured pelvis     History of pancreatitis     Hypertension     Knee swelling 1968    Low back pain     Low back strain 2018    PT resolved it    Sleep apnea     Tear of meniscus of knee         Past Surgical History:   Procedure Laterality Date    CARPAL TUNNEL RELEASE Right 2018    CATARACT EXTRACTION, BILATERAL      CHOLECYSTECTOMY  2014    CORONARY STENT PLACEMENT  2015    mid LAD    FOOT SURGERY Left 1992    bone growth removal    HAND SURGERY  2018    Carpal tunnel    HEMORRHOIDECTOMY      JOINT REPLACEMENT Right 2003    knee    KNEE SURGERY Right     ACL repair    RETINAL DETACHMENT REPAIR Right     TOTAL KNEE ARTHROPLASTY Left 3/6/2020    Procedure: TOTAL KNEE ARTHROPLASTY;  Surgeon: bOed Ramos MD;  Location: [facility];  Service: Orthopedics;  Laterality: Left;    TRIGGER POINT INJECTION  Fo get                         PT Assessment/Plan       Row Name 01/10/25 0800          PT Assessment    Assessment Comments Mr. Anguiano returns to PT for third follow up ambulating with SPC. Pt reports severe flare up in symptoms following last session that lasted for several days. He did receive CHANO on 25 initially leading to  significant worsening in symptoms that has since improved. He now reports overall 50% improvement in his pain. He can still not tolerate laying in his bed but able to perform household chores with greater ease. Patient placed in flowers position with knees bent for majority of session. He tolerated the addition of PPT, PPT + marches, HL hip abd/add with TA activation. Patient without complaint of increased nerve pain throughout session. No changes made to HEP to assess patient response. Will update next visit if appropriate. Yunior Anguiano remains a candidate for skilled PT.  -JS        PT Plan    PT Plan Comments response to last session, update HEP if appropriate, consider HL lumbar distraction with mob belt? resume nerve flossing? gentle lateral stepping with TA  -JS               User Key  (r) = Recorded By, (t) = Taken By, (c) = Cosigned By      Initials Name Provider Type    Rena Rodriguez, PT Physical Therapist                       OP Exercises       Row Name 01/10/25 0700             Subjective    Subjective Comments I was very flared up after our last session but lasted for several days. I got the epidural last week and I am now feeling about 50% better  -JS         Subjective Pain    Able to rate subjective pain? yes  -JS      Pre-Treatment Pain Level 1  -JS         Total Minutes    04352 - PT Therapeutic Exercise Minutes 38  -JS         Exercise 1    Exercise Name 1 nustep  -JS      Time 1 5 mins  -JS         Exercise 3    Exercise Name 3 seated piriformis stretch/figure 4  -JS      Cueing 3 Verbal;Demo  -JS      Reps 3 3e  -JS      Time 3 20s  -JS         Exercise 5    Exercise Name 5 seated gentle sciatic nerve glide  -JS      Sets 5 3  -JS      Reps 5 10  -JS      Time 5 slowly working into it with each round  -JS         Exercise 6    Exercise Name 6 reclined PPT  -JS      Sets 6 1  -JS      Reps 6 15  -JS      Time 6 5  -JS      Additional Comments reclined position as high as possible   -JS         Exercise 7    Exercise Name 7 HL hip abd/add  -JS      Cueing 7 Verbal;Tactile  -JS      Sets 7 2  -JS      Reps 7 10e  -JS      Time 7 pink ball/GTB  -JS      Additional Comments reclined position as high as possible  -JS         Exercise 8    Exercise Name 8 reclined PPT+ marching  -JS      Cueing 8 Verbal;Tactile  -JS      Additional Comments reclined position as high as possible  -JS         Exercise 9    Exercise Name 9 STS with TA  -JS      Cueing 9 Verbal;Demo  -JS      Reps 9 10  -JS      Additional Comments clinic chair  -JS                User Key  (r) = Recorded By, (t) = Taken By, (c) = Cosigned By      Initials Name Provider Type    Rena Rodriguez, PT Physical Therapist                                  PT OP Goals       Row Name 01/10/25 0700          PT Short Term Goals    STG Date to Achieve 01/17/25  -JS     STG 1 Pt. will be independent with initial HEP to improve self-management of condition.  -JS     STG 1 Progress Met  -JS     STG 2 Patient will demonstrate proper log roll mechanics with little to no cues when getting on/off treatment table to demonstrate improved mechanics and decreased strain on lumbar spine.  -JS     STG 2 Progress Met  -JS     STG 3 Pt will improve lumbar AROM (specifically L lateral flexion and L rotation) with no radiating symptoms down LLE for improved mobility and participation in recreational activities.  -JS     STG 3 Progress Ongoing  -JS     STG 4 Patient will improve laying tolerance from 30 min to >2 hours without radiating symptoms down LLE to L calf to improve quality of sleep.  -JS     STG 4 Progress Ongoing  -JS        Long Term Goals    LTG Date to Achieve 02/16/25  -JS     LTG 1 Pt. will be independent with advanced HEP to improve long term independence with self management of condition.  -JS     LTG 1 Progress Ongoing  -JS     LTG 2 Pt. will score </= 6/50 on Modified Oswestry to indicate improved perception of disability.  -JS     LTG 2  Progress Ongoing  -JS     LTG 3 Pt. will demonstrate proper TrA engagement in standing and dynamic movements to improve lumbopelvic stability.  -JS     LTG 3 Progress Ongoing  -JS     LTG 4 Patient will improve ability to sleep through the night in his bed (vs recliner at original appt) without sleep disturbances related to back pain to improve overall sleep quality and quality of life.  -JS     LTG 4 Progress Ongoing  -JS     LTG 5 Pt will return to previous frequency of swimming, >/= 15 laps in the pool, with no radiating symptoms down LLE to assist with greater participation in recreational activities.  -     LTG 5 Progress Ongoing  -JS               User Key  (r) = Recorded By, (t) = Taken By, (c) = Cosigned By      Initials Name Provider Type    Rena Rodriguez, PT Physical Therapist                    Therapy Education  Given: HEP, Symptoms/condition management, Pain management, Posture/body mechanics  Program: Reinforced, Modified  How Provided: Verbal, Demonstration  Provided to: Patient  Level of Understanding: Teach back education performed, Verbalized, Demonstrated              Time Calculation:   Start Time: 0752  Stop Time: 0830  Time Calculation (min): 38 min  Timed Charges  87791 - PT Therapeutic Exercise Minutes: 38  Total Minutes  Timed Charges Total Minutes: 38   Total Minutes: 38  Therapy Charges for Today       Code Description Service Date Service Provider Modifiers Qty    [id number]  PT THER PROC EA 15 MIN 1/10/2025 Rena Schneider, PT GP 3                      Rena Schneider PT  1/10/2025

## 2025-01-13 ENCOUNTER — HOSPITAL ENCOUNTER (OUTPATIENT)
Dept: PHYSICAL THERAPY | Facility: HOSPITAL | Age: 80
Setting detail: THERAPIES SERIES
Discharge: HOME | End: 2025-01-13
Payer: MEDICARE

## 2025-01-13 DIAGNOSIS — G89.29 CHRONIC MIDLINE LOW BACK PAIN WITH LEFT-SIDED SCIATICA: Primary | ICD-10-CM

## 2025-01-13 DIAGNOSIS — M54.42 CHRONIC MIDLINE LOW BACK PAIN WITH LEFT-SIDED SCIATICA: Primary | ICD-10-CM

## 2025-01-13 DIAGNOSIS — M79.662 PAIN OF LEFT CALF: ICD-10-CM

## 2025-01-13 PROCEDURE — 97110 THERAPEUTIC EXERCISES: CPT

## 2025-01-13 NOTE — THERAPY TREATMENT NOTE
Outpatient Physical Therapy Ortho Treatment Note  [facility]     Patient Name: Yunior Anguiano  : 1945  MRN: [id number]  Today's Date: 2025      Visit Date: 2025    Visit Dx:    ICD-10-CM ICD-9-CM   1. Chronic midline low back pain with left-sided sciatica  M54.42 724.2    G89.29 724.3     338.29   2. Pain of left calf  M79.662 729.5       Patient Active Problem List   Diagnosis    Primary osteoarthritis of knee    OA (osteoarthritis) of knee        Past Medical History:   Diagnosis Date    Arthritis     LEFT KNEE-GETS STIFF AFTER SITTING AND UNSTABLE GETTING UP FROM SITTING POSITION    Cardiac disease     Cardiac murmur     CTS (carpal tunnel syndrome) 2016    Surgery 2018    Fracture, foot 2013    History of chest pain     History of fractured pelvis     History of pancreatitis     Hypertension     Knee swelling 1968    Low back pain     Low back strain 2018    PT resolved it    Sleep apnea     Tear of meniscus of knee 1968        Past Surgical History:   Procedure Laterality Date    CARPAL TUNNEL RELEASE Right 2018    CATARACT EXTRACTION, BILATERAL      CHOLECYSTECTOMY  2014    CORONARY STENT PLACEMENT  2015    mid LAD    FOOT SURGERY Left 1992    bone growth removal    HAND SURGERY  2018    Carpal tunnel    HEMORRHOIDECTOMY      JOINT REPLACEMENT Right 2003    knee    KNEE SURGERY Right     ACL repair    RETINAL DETACHMENT REPAIR Right     TOTAL KNEE ARTHROPLASTY Left 3/6/2020    Procedure: TOTAL KNEE ARTHROPLASTY;  Surgeon: Obed Ramos MD;  Location: [facility];  Service: Orthopedics;  Laterality: Left;    TRIGGER POINT INJECTION  Fo get                         PT Assessment/Plan       Row Name 25 0800          PT Assessment    Assessment Comments Mr. Anguiano returns to PT today reporting 0/10 pain, poor compliance with HEP, and questions regarding program, symptoms, and prognosis. Answered all questions and concerns of topics mentioned. Encouraged greater  commpliance with HEP to reep greatest benefits and effects of PT for future prevention of LBP with radiculopathy, and discussed methods to try to assist with the compliance. Maintained seated and standing positions only for exercises today, focusing on postural awareness with TrA activation. Introduced seated TrA, seated PPT + marches, and standing rows. Pt remains with moderate rounded shoulders and forward head; standing rows initiated to improve awareness of posture and strengthen dorsal scapular region. Pt had mild flare of achilles tension during standing activities, which diminished with seated rest breaks and nerve flossing between. Updated/modified HEP with expectations  of greater compliance and further discussed dynamic vs static stretching. 0/10 pain in beginning and at end of visit. Pt verbalizes understanding. Mr. Anguiano remains appropriate for skilled PT.  -DR        PT Plan    PT Plan Comments response to last visit; consider HL lumbar distraction with mob belt? (ran out of time last visit with increased education and HEP modifications), gentle lateral stepping, resume AR press?  -               User Key  (r) = Recorded By, (t) = Taken By, (c) = Cosigned By      Initials Name Provider Type    Jose C Otero, PT Physical Therapist                       OP Exercises       Row Name 01/13/25 0700             Subjective    Subjective Comments I still can't lay on my back. I tried it though and couldn't last in positions longer than 30 minutes.  -         Subjective Pain    Able to rate subjective pain? yes  -DR      Pre-Treatment Pain Level 0  -DR      Post-Treatment Pain Level 0  -DR         Total Minutes    01762 - PT Therapeutic Exercise Minutes 42  -DR         Exercise 1    Exercise Name 1 nustep  -DR      Cueing 1 Verbal  -DR      Time 1 5 mins  -DR         Exercise 2    Exercise Name 2 seated HS str  -DR      Cueing 2 Verbal;Demo  -      Reps 2 3  -DR      Time 2 20s  -          Exercise 3    Exercise Name 3 seated piriformis stretch/figure 4  -DR      Cueing 3 Verbal;Demo  -DR      Reps 3 3e  -DR      Time 3 20s  -DR         Exercise 5    Exercise Name 5 seated gentle sciatic nerve glide  -DR      Cueing 5 Verbal  -DR      Sets 5 3  -DR      Reps 5 10  -DR      Time 5 slowly working into it with each round  -DR         Exercise 8    Exercise Name 8 seated PPT with march  -DR      Cueing 8 Verbal;Tactile  -DR      Sets 8 1e  -DR      Reps 8 10 alt  -DR         Exercise 9    Exercise Name 9 STS with TA  -DR      Cueing 9 Verbal;Demo  -DR      Reps 9 10  -DR      Time 9 standard clinic chair  -DR      Additional Comments increased achilles tension post-exs  -DR         Exercise 10    Exercise Name 10 seated TrA  -DR      Cueing 10 Verbal;Demo  -DR      Reps 10 15  -DR      Time 10 5s  -DR      Additional Comments fingers medial to ASIS for tactile cues  -DR         Exercise 11    Exercise Name 11 standing rows  -DR      Cueing 11 Verbal;Demo  -DR      Sets 11 2  -DR      Reps 11 10  -DR      Time 11 GTB  -DR      Additional Comments cues for postural awareness, scap retract and depress  -DR                User Key  (r) = Recorded By, (t) = Taken By, (c) = Cosigned By      Initials Name Provider Type    Jose C Otero, PT Physical Therapist                                  PT OP Goals       Row Name 01/13/25 0700          PT Short Term Goals    STG Date to Achieve 01/17/25  -DR     STG 1 Pt. will be independent with initial HEP to improve self-management of condition.  -     STG 1 Progress Met  -DR     STG 2 Patient will demonstrate proper log roll mechanics with little to no cues when getting on/off treatment table to demonstrate improved mechanics and decreased strain on lumbar spine.  -     STG 2 Progress Met  -DR     STG 3 Pt will improve lumbar AROM (specifically L lateral flexion and L rotation) with no radiating symptoms down LLE for improved mobility and participation in  recreational activities.  -     STG 3 Progress Ongoing  -     STG 4 Patient will improve laying tolerance from 30 min to >2 hours without radiating symptoms down LLE to L calf to improve quality of sleep.  -     STG 4 Progress Ongoing  ALECIA        Long Term Goals    LTG Date to Achieve 02/16/25  -     LTG 1 Pt. will be independent with advanced HEP to improve long term independence with self management of condition.  -     LTG 1 Progress Ongoing  -     LTG 2 Pt. will score </= 6/50 on Modified Oswestry to indicate improved perception of disability.  -     LTG 2 Progress Ongoing  -     LTG 3 Pt. will demonstrate proper TrA engagement in standing and dynamic movements to improve lumbopelvic stability.  -     LTG 3 Progress Ongoing  -     LTG 4 Patient will improve ability to sleep through the night in his bed (vs recliner at original appt) without sleep disturbances related to back pain to improve overall sleep quality and quality of life.  -     LTG 4 Progress Ongoing  -     LTG 5 Pt will return to previous frequency of swimming, >/= 15 laps in the pool, with no radiating symptoms down LLE to assist with greater participation in recreational activities.  -     LTG 5 Progress Ongoing  ALECIA               User Key  (r) = Recorded By, (t) = Taken By, (c) = Cosigned By      Initials Name Provider Type    Jose C Otero, PT Physical Therapist                    Therapy Education  Education Details: updated and modified HEP, discussed importance of completion of HEP at home to obtain greatest effects of PT towards injury, static vs dynamic stretching, TrA activation with functional activity  Given: HEP, Symptoms/condition management, Pain management, Posture/body mechanics  Program: Reinforced, Progressed, Modified  How Provided: Verbal, Demonstration, Written  Provided to: Patient  Level of Understanding: Teach back education performed, Verbalized, Demonstrated              Time Calculation:    Start Time: 0730  Stop Time: 0812  Time Calculation (min): 42 min  Timed Charges  59531 - PT Therapeutic Exercise Minutes: 42  Total Minutes  Timed Charges Total Minutes: 42   Total Minutes: 42  Therapy Charges for Today       Code Description Service Date Service Provider Modifiers Qty    04375926409 HC PT THER PROC EA 15 MIN 1/13/2025 Jose C Monaco, PT GP 3                      Jose C Monaco, PT  1/13/2025

## 2025-01-15 ENCOUNTER — HOSPITAL ENCOUNTER (OUTPATIENT)
Dept: PHYSICAL THERAPY | Facility: HOSPITAL | Age: 80
Setting detail: THERAPIES SERIES
Discharge: HOME | End: 2025-01-15
Payer: MEDICARE

## 2025-01-15 DIAGNOSIS — G89.29 CHRONIC MIDLINE LOW BACK PAIN WITH LEFT-SIDED SCIATICA: Primary | ICD-10-CM

## 2025-01-15 DIAGNOSIS — M79.662 PAIN OF LEFT CALF: ICD-10-CM

## 2025-01-15 DIAGNOSIS — M54.42 CHRONIC MIDLINE LOW BACK PAIN WITH LEFT-SIDED SCIATICA: Primary | ICD-10-CM

## 2025-01-15 PROCEDURE — 97110 THERAPEUTIC EXERCISES: CPT

## 2025-01-15 NOTE — THERAPY TREATMENT NOTE
Outpatient Physical Therapy Ortho Treatment Note  [facility]     Patient Name: Yunior Anguiano  : 1945  MRN: [id number]  Today's Date: 1/15/2025      Visit Date: 01/15/2025    Visit Dx:    ICD-10-CM ICD-9-CM   1. Chronic midline low back pain with left-sided sciatica  M54.42 724.2    G89.29 724.3     338.29   2. Pain of left calf  M79.662 729.5       Patient Active Problem List   Diagnosis    Primary osteoarthritis of knee    OA (osteoarthritis) of knee        Past Medical History:   Diagnosis Date    Arthritis     LEFT KNEE-GETS STIFF AFTER SITTING AND UNSTABLE GETTING UP FROM SITTING POSITION    Cardiac disease     Cardiac murmur     CTS (carpal tunnel syndrome) 2016    Surgery 2018    Fracture, foot 2013    History of chest pain     History of fractured pelvis     History of pancreatitis     Hypertension     Knee swelling 1968    Low back pain     Low back strain 2018    PT resolved it    Sleep apnea     Tear of meniscus of knee         Past Surgical History:   Procedure Laterality Date    CARPAL TUNNEL RELEASE Right 2018    CATARACT EXTRACTION, BILATERAL      CHOLECYSTECTOMY  2014    CORONARY STENT PLACEMENT  2015    mid LAD    FOOT SURGERY Left 1992    bone growth removal    HAND SURGERY  2018    Carpal tunnel    HEMORRHOIDECTOMY      JOINT REPLACEMENT Right 2003    knee    KNEE SURGERY Right     ACL repair    RETINAL DETACHMENT REPAIR Right     TOTAL KNEE ARTHROPLASTY Left 3/6/2020    Procedure: TOTAL KNEE ARTHROPLASTY;  Surgeon: Obed Ramos MD;  Location: [facility];  Service: Orthopedics;  Laterality: Left;    TRIGGER POINT INJECTION  Fo get                         PT Assessment/Plan       Row Name 01/15/25 1000          PT Assessment    Functional Limitations Impaired locomotion;Limitations in community activities;Performance in leisure activities;Performance in self-care ADL;Impaired gait;Decreased safety during functional activities;Limitations in functional capacity  ----- Message from Inga Hernandez sent at 9/7/2018  8:42 AM CDT -----  Contact: Patient  Type:  Patient Requesting Referral    Who Called:  patient  Does the patient already have the specialty appointment scheduled?:  no  If yes, what is the date of that appointment?:  na  Referral to What Specialty:  ENT  Reason for Referral: Patient was advised buy Dr. Bowles to follow up with ENT if strep throat has not resolved, she feels it is getting worse  Does the patient want the referral with a specific physician?:  na  Is the specialist an [facility]?:  na  Patient Requesting a Call Back?: yes  Best Call Back Number: [phone] (home)          and performance  -JS     Impairments Impaired flexibility;Posture;Poor body mechanics;Pain;Muscle strength;Locomotion;Range of motion;Gait;Joint mobility;Sensation;Peripheral nerve integrity  -JS     Assessment Comments Yunior Anguiano has been seen for 6 physical therapy sessions for LBP with L sided radiculopathy. Patient reports overall 85% improvement since the start of PT. He reports significant improvement in his ability to complete ADLs/household chores with minimal to no pain. He was able to shovel snow in his driveway with minimal discomfort. He received CHANO on 1/2/25 leading to initial flare up followed by significant improvement in pain. His primary complaint continues to involve sleeping tolerance in supine position. At initial evaluation, he was unable to tolerate supine position and now he can sleep for ~ 2-3 hours before onset of N/T into L lateral gastroc. Patient typically responds well to seated sciatic nerve glides leading to reduction in N/T of L lateral calf. Encouraged patient to perform gentle sciatic nerve flossing immediately upon waking to improve comfort and ability to increase sleep tolerance. Treatment has included therapeutic exercise, manual therapy, and patient education with home exercise program . Progress to physical therapy goals is good. Pt has met 3/4 STG with partially meeting remaining STG and 0/5 LTG and progressing towards remaining goals. Mr. Anguiano continues to demonstrate FF/L lateral flexion posturing with all standing tasks. Added single arm R shoulder row + trunk rotation, repeated R lumbar SB/ext and standing hip extension. Patient tolerated progressions well. No changes made to HEP at this time. He will benefit from continued skilled physical therapy to address remaining impairments and functional limitations.  -JS     Please refer to paper survey for additional self-reported information Yes  -JS     Rehab Potential Good  -JS     Patient/caregiver participated  in establishment of treatment plan and goals Yes  -JS     Patient would benefit from skilled therapy intervention Yes  -JS        PT Plan    PT Frequency 2x/week;1x/week  -JS     Predicted Duration of Therapy Intervention (PT) 6-10 visits  -JS     Planned CPT's? PT RE-EVAL: 40991;PT THER PROC EA 15 MIN: 71684;PT THER ACT EA 15 MIN: 49522;PT MANUAL THERAPY EA 15 MIN: 33667;PT NEUROMUSC RE-EDUCATION EA 15 MIN: 07873;PT GAIT TRAINING EA 15 MIN: 60641;PT SELF CARE/HOME MGMT/TRAIN EA 15: 53555;PT HOT OR COLD PACK TREAT MCARE;PT ELECTRICAL STIM UNATTEND: ;PT TRACTION LUMBAR: 47728  -JS     Physical Therapy Interventions (Optional Details) dry needling  -JS     PT Plan Comments response to last session, continue to progress into extension based movements. consider marching at wall with SB, AR press, marches with shoulder ext?  -JS               User Key  (r) = Recorded By, (t) = Taken By, (c) = Cosigned By      Initials Name Provider Type    Rena Rodriguez, PT Physical Therapist                       OP Exercises       Row Name 01/15/25 0900             Subjective    Subjective Comments I have been feeling pretty good over the last week. I was able to lay in my bed for about 2-3 hours before I started feeling calf pain  -JS         Subjective Pain    Able to rate subjective pain? yes  -JS      Pre-Treatment Pain Level 0  -JS         Total Minutes    60581 - PT Therapeutic Exercise Minutes 43  -JS         Exercise 1    Exercise Name 1 nustep  -JS      Cueing 1 Verbal  -JS      Time 1 5 mins  -JS         Exercise 2    Exercise Name 2 seated HS str  -JS      Cueing 2 Verbal;Demo  -JS      Reps 2 3  -JS      Time 2 20s  -JS         Exercise 3    Exercise Name 3 seated piriformis stretch/figure 4  -JS      Cueing 3 Verbal;Demo  -JS      Reps 3 3e  -JS      Time 3 20s  -JS         Exercise 5    Exercise Name 5 seated gentle sciatic nerve glide  -JS      Cueing 5 Verbal  -JS      Sets 5 3  -JS      Reps 5 10  -JS       Time 5 slowly working into it with each round  -JS      Additional Comments throughout session  -JS         Exercise 9    Exercise Name 9 STS with TA  -JS      Cueing 9 Verbal;Demo  -JS      Reps 9 10  -JS      Time 9 standard clinic chair  -JS      Additional Comments shoulder flexion with light pink ball to achieve more neutral posture  -JS         Exercise 11    Exercise Name 11 single arm R shoulder row + trunk rotaton (small range)  -JS      Cueing 11 Verbal;Demo  -JS      Sets 11 2  -JS      Reps 11 10  -JS      Time 11 GTB  -JS         Exercise 12    Exercise Name 12 standing hip extension  -JS      Cueing 12 Verbal;Demo  -JS      Reps 12 10e  -JS         Exercise 13    Exercise Name 13 repeated lumbar extension/SB  -JS      Cueing 13 Verbal;Demo  -JS      Sets 13 2  -JS      Reps 13 10  -JS                User Key  (r) = Recorded By, (t) = Taken By, (c) = Cosigned By      Initials Name Provider Type    Rena Rodriguez, PT Physical Therapist                                  PT OP Goals       Row Name 01/15/25 0900          PT Short Term Goals    STG Date to Achieve 01/17/25  -JS     STG 1 Pt. will be independent with initial HEP to improve self-management of condition.  -JS     STG 1 Progress Met  -JS     STG 2 Patient will demonstrate proper log roll mechanics with little to no cues when getting on/off treatment table to demonstrate improved mechanics and decreased strain on lumbar spine.  -JS     STG 2 Progress Met  -JS     STG 3 Pt will improve lumbar AROM (specifically L lateral flexion and L rotation) with no radiating symptoms down LLE for improved mobility and participation in recreational activities.  -JS     STG 3 Progress Partially Met  -JS     STG 3 Progress Comments mild N/T to L gastroc with L rotation  -JS     STG 4 Patient will improve laying tolerance from 30 min to >2 hours without radiating symptoms down LLE to L calf to improve quality of sleep.  -JS     STG 4 Progress Met  -JS     STG  4 Progress Comments 2-3 hours  -        Long Term Goals    LTG Date to Achieve 02/16/25  -JS     LTG 1 Pt. will be independent with advanced HEP to improve long term independence with self management of condition.  -JS     LTG 1 Progress Ongoing  -JS     LTG 2 Pt. will score </= 6/50 on Modified Oswestry to indicate improved perception of disability.  -JS     LTG 2 Progress Ongoing;Progressing  -JS     LTG 2 Progress Comments 8/50  -JS     LTG 3 Pt. will demonstrate proper TrA engagement in standing and dynamic movements to improve lumbopelvic stability.  -JS     LTG 3 Progress Ongoing  -JS     LTG 4 Patient will improve ability to sleep through the night in his bed (vs recliner at original appt) without sleep disturbances related to back pain to improve overall sleep quality and quality of life.  -JS     LTG 4 Progress Ongoing  -JS     LTG 5 Pt will return to previous frequency of swimming, >/= 15 laps in the pool, with no radiating symptoms down LLE to assist with greater participation in recreational activities.  -     LTG 5 Progress Ongoing  -               User Key  (r) = Recorded By, (t) = Taken By, (c) = Cosigned By      Initials Name Provider Type    Rena Rodriguez, PT Physical Therapist                    Therapy Education  Education Details: benefit of sciatic nerve floss for improved comfort while sleeping  Given: HEP, Symptoms/condition management, Pain management, Posture/body mechanics  Program: Reinforced, Progressed, Modified  How Provided: Verbal, Demonstration  Provided to: Patient  Level of Understanding: Teach back education performed, Verbalized, Demonstrated       Modified Oswestry  Modified Oswestry Score/Comments: 8/50 = 16%      Time Calculation:   Start Time: 0930  Stop Time: 1013  Time Calculation (min): 43 min  Timed Charges  43794 - PT Therapeutic Exercise Minutes: 43  Total Minutes  Timed Charges Total Minutes: 43   Total Minutes: 43  Therapy Charges for Today       Code  Description Service Date Service Provider Modifiers Qty    75093406195 HC PT THER PROC EA 15 MIN 1/15/2025 Rena Schneider, PT GP 3            PT G-Codes  Modified Oswestry Score/Comments: 8/50 = 16%         Rena Schneider, PT  1/15/2025

## 2025-01-15 NOTE — THERAPY PROGRESS REPORT/RE-CERT
Outpatient Physical Therapy Ortho Progress Note  [facility]     Patient Name: Yunior Anguiano  : 1945  MRN: [id number]  Today's Date: 1/15/2025      Visit Date: 01/15/2025    Visit Dx:    ICD-10-CM ICD-9-CM   1. Chronic midline low back pain with left-sided sciatica  M54.42 724.2    G89.29 724.3     338.29   2. Pain of left calf  M79.662 729.5       Patient Active Problem List   Diagnosis    Primary osteoarthritis of knee    OA (osteoarthritis) of knee        Past Medical History:   Diagnosis Date    Arthritis     LEFT KNEE-GETS STIFF AFTER SITTING AND UNSTABLE GETTING UP FROM SITTING POSITION    Cardiac disease     Cardiac murmur     CTS (carpal tunnel syndrome) 2016    Surgery 2018    Fracture, foot 2013    History of chest pain     History of fractured pelvis     History of pancreatitis     Hypertension     Knee swelling 1968    Low back pain     Low back strain 2018    PT resolved it    Sleep apnea     Tear of meniscus of knee         Past Surgical History:   Procedure Laterality Date    CARPAL TUNNEL RELEASE Right 2018    CATARACT EXTRACTION, BILATERAL      CHOLECYSTECTOMY  2014    CORONARY STENT PLACEMENT  2015    mid LAD    FOOT SURGERY Left 1992    bone growth removal    HAND SURGERY  2018    Carpal tunnel    HEMORRHOIDECTOMY      JOINT REPLACEMENT Right 2003    knee    KNEE SURGERY Right     ACL repair    RETINAL DETACHMENT REPAIR Right     TOTAL KNEE ARTHROPLASTY Left 3/6/2020    Procedure: TOTAL KNEE ARTHROPLASTY;  Surgeon: Obed Ramos MD;  Location: [facility];  Service: Orthopedics;  Laterality: Left;    TRIGGER POINT INJECTION  Fo get                         PT Assessment/Plan       Row Name 01/15/25 1000          PT Assessment    Functional Limitations Impaired locomotion;Limitations in community activities;Performance in leisure activities;Performance in self-care ADL;Impaired gait;Decreased safety during functional activities;Limitations in functional capacity and  performance  -JS     Impairments Impaired flexibility;Posture;Poor body mechanics;Pain;Muscle strength;Locomotion;Range of motion;Gait;Joint mobility;Sensation;Peripheral nerve integrity  -JS     Assessment Comments Yunior Anguiano has been seen for 6 physical therapy sessions for LBP with L sided radiculopathy. Patient reports overall 85% improvement since the start of PT. He reports significant improvement in his ability to complete ADLs/household chores with minimal to no pain. He was able to shovel snow in his driveway with minimal discomfort. He received CHANO on 1/2/25 leading to initial flare up followed by significant improvement in pain. His primary complaint continues to involve sleeping tolerance in supine position. At initial evaluation, he was unable to tolerate supine position and now he can sleep for ~ 2-3 hours before onset of N/T into L lateral gastroc. Patient typically responds well to seated sciatic nerve glides leading to reduction in N/T of L lateral calf. Encouraged patient to perform gentle sciatic nerve flossing immediately upon waking to improve comfort and ability to increase sleep tolerance. Treatment has included therapeutic exercise, manual therapy, and patient education with home exercise program . Progress to physical therapy goals is good. Pt has met 3/4 STG with partially meeting remaining STG and 0/5 LTG and progressing towards remaining goals. Mr. Anguiano continues to demonstrate FF/L lateral flexion posturing with all standing tasks. Added single arm R shoulder row + trunk rotation, repeated R lumbar SB/ext and standing hip extension. Patient tolerated progressions well. No changes made to HEP at this time. He will benefit from continued skilled physical therapy to address remaining impairments and functional limitations.  -JS     Please refer to paper survey for additional self-reported information Yes  -JS     Rehab Potential Good  -JS     Patient/caregiver participated in  establishment of treatment plan and goals Yes  -JS     Patient would benefit from skilled therapy intervention Yes  -JS        PT Plan    PT Frequency 2x/week;1x/week  -JS     Predicted Duration of Therapy Intervention (PT) 6-10 visits  -JS     Planned CPT's? PT RE-EVAL: 02505;PT THER PROC EA 15 MIN: 79417;PT THER ACT EA 15 MIN: 79496;PT MANUAL THERAPY EA 15 MIN: 89415;PT NEUROMUSC RE-EDUCATION EA 15 MIN: 79503;PT GAIT TRAINING EA 15 MIN: 27308;PT SELF CARE/HOME MGMT/TRAIN EA 15: 83475;PT HOT OR COLD PACK TREAT MCARE;PT ELECTRICAL STIM UNATTEND: ;PT TRACTION LUMBAR: 83864  -JS     Physical Therapy Interventions (Optional Details) dry needling  -JS     PT Plan Comments response to last session, continue to progress into extension based movements. consider marching at wall with SB, AR press, marches with shoulder ext?  -JS               User Key  (r) = Recorded By, (t) = Taken By, (c) = Cosigned By      Initials Name Provider Type    Rena Rodriguez, PT Physical Therapist                       OP Exercises       Row Name 01/15/25 0900             Subjective    Subjective Comments I have been feeling pretty good over the last week. I was able to lay in my bed for about 2-3 hours before I started feeling calf pain  -JS         Subjective Pain    Able to rate subjective pain? yes  -JS      Pre-Treatment Pain Level 0  -JS         Total Minutes    00514 - PT Therapeutic Exercise Minutes 43  -JS         Exercise 1    Exercise Name 1 nustep  -JS      Cueing 1 Verbal  -JS      Time 1 5 mins  -JS         Exercise 2    Exercise Name 2 seated HS str  -JS      Cueing 2 Verbal;Demo  -JS      Reps 2 3  -JS      Time 2 20s  -JS         Exercise 3    Exercise Name 3 seated piriformis stretch/figure 4  -JS      Cueing 3 Verbal;Demo  -JS      Reps 3 3e  -JS      Time 3 20s  -JS         Exercise 5    Exercise Name 5 seated gentle sciatic nerve glide  -JS      Cueing 5 Verbal  -JS      Sets 5 3  -JS      Reps 5 10  -JS       Time 5 slowly working into it with each round  -JS      Additional Comments throughout session  -JS         Exercise 9    Exercise Name 9 STS with TA  -JS      Cueing 9 Verbal;Demo  -JS      Reps 9 10  -JS      Time 9 standard clinic chair  -JS      Additional Comments shoulder flexion with light pink ball to achieve more neutral posture  -JS         Exercise 11    Exercise Name 11 single arm R shoulder row + trunk rotaton (small range)  -JS      Cueing 11 Verbal;Demo  -JS      Sets 11 2  -JS      Reps 11 10  -JS      Time 11 GTB  -JS         Exercise 12    Exercise Name 12 standing hip extension  -JS      Cueing 12 Verbal;Demo  -JS      Reps 12 10e  -JS         Exercise 13    Exercise Name 13 repeated lumbar extension/SB  -JS      Cueing 13 Verbal;Demo  -JS      Sets 13 2  -JS      Reps 13 10  -JS                User Key  (r) = Recorded By, (t) = Taken By, (c) = Cosigned By      Initials Name Provider Type    Rena Rodriguez, PT Physical Therapist                                  PT OP Goals       Row Name 01/15/25 0900          PT Short Term Goals    STG Date to Achieve 01/17/25  -JS     STG 1 Pt. will be independent with initial HEP to improve self-management of condition.  -JS     STG 1 Progress Met  -JS     STG 2 Patient will demonstrate proper log roll mechanics with little to no cues when getting on/off treatment table to demonstrate improved mechanics and decreased strain on lumbar spine.  -JS     STG 2 Progress Met  -JS     STG 3 Pt will improve lumbar AROM (specifically L lateral flexion and L rotation) with no radiating symptoms down LLE for improved mobility and participation in recreational activities.  -JS     STG 3 Progress Partially Met  -JS     STG 3 Progress Comments mild N/T to L gastroc with L rotation  -JS     STG 4 Patient will improve laying tolerance from 30 min to >2 hours without radiating symptoms down LLE to L calf to improve quality of sleep.  -JS     STG 4 Progress Met  -JS     STG  4 Progress Comments 2-3 hours  -        Long Term Goals    LTG Date to Achieve 02/16/25  -JS     LTG 1 Pt. will be independent with advanced HEP to improve long term independence with self management of condition.  -JS     LTG 1 Progress Ongoing  -JS     LTG 2 Pt. will score </= 6/50 on Modified Oswestry to indicate improved perception of disability.  -JS     LTG 2 Progress Ongoing;Progressing  -JS     LTG 2 Progress Comments 8/50  -JS     LTG 3 Pt. will demonstrate proper TrA engagement in standing and dynamic movements to improve lumbopelvic stability.  -JS     LTG 3 Progress Ongoing  -JS     LTG 4 Patient will improve ability to sleep through the night in his bed (vs recliner at original appt) without sleep disturbances related to back pain to improve overall sleep quality and quality of life.  -JS     LTG 4 Progress Ongoing  -JS     LTG 5 Pt will return to previous frequency of swimming, >/= 15 laps in the pool, with no radiating symptoms down LLE to assist with greater participation in recreational activities.  -     LTG 5 Progress Ongoing  -               User Key  (r) = Recorded By, (t) = Taken By, (c) = Cosigned By      Initials Name Provider Type    Rena Rodriguez, PT Physical Therapist                    Therapy Education  Education Details: benefit of sciatic nerve floss for improved comfort while sleeping  Given: HEP, Symptoms/condition management, Pain management, Posture/body mechanics  Program: Reinforced, Progressed, Modified  How Provided: Verbal, Demonstration  Provided to: Patient  Level of Understanding: Teach back education performed, Verbalized, Demonstrated       Modified Oswestry  Modified Oswestry Score/Comments: 8/50 = 16%      Time Calculation:   Start Time: 0930  Stop Time: 1013  Time Calculation (min): 43 min  Timed Charges  49177 - PT Therapeutic Exercise Minutes: 43  Total Minutes  Timed Charges Total Minutes: 43   Total Minutes: 43  Therapy Charges for Today       Code  Description Service Date Service Provider Modifiers Qty    84941610258 HC PT THER PROC EA 15 MIN 1/15/2025 Rena Schneider, PT GP 3            PT G-Codes  Modified Oswestry Score/Comments: 8/50 = 16%         Rena Schneider, PT  1/15/2025

## 2025-01-20 ENCOUNTER — HOSPITAL ENCOUNTER (OUTPATIENT)
Dept: PHYSICAL THERAPY | Facility: HOSPITAL | Age: 80
Setting detail: THERAPIES SERIES
Discharge: HOME | End: 2025-01-20
Payer: MEDICARE

## 2025-01-20 DIAGNOSIS — G89.29 CHRONIC MIDLINE LOW BACK PAIN WITH LEFT-SIDED SCIATICA: Primary | ICD-10-CM

## 2025-01-20 DIAGNOSIS — M79.662 PAIN OF LEFT CALF: ICD-10-CM

## 2025-01-20 DIAGNOSIS — M54.42 CHRONIC MIDLINE LOW BACK PAIN WITH LEFT-SIDED SCIATICA: Primary | ICD-10-CM

## 2025-01-20 PROCEDURE — 97110 THERAPEUTIC EXERCISES: CPT

## 2025-01-20 NOTE — THERAPY TREATMENT NOTE
Outpatient Physical Therapy Ortho Treatment Note  [facility]     Patient Name: Yunior Anguiano  : 1945  MRN: [id number]  Today's Date: 2025      Visit Date: 2025    Visit Dx:    ICD-10-CM ICD-9-CM   1. Chronic midline low back pain with left-sided sciatica  M54.42 724.2    G89.29 724.3     338.29   2. Pain of left calf  M79.662 729.5       Patient Active Problem List   Diagnosis    Primary osteoarthritis of knee    OA (osteoarthritis) of knee        Past Medical History:   Diagnosis Date    Arthritis     LEFT KNEE-GETS STIFF AFTER SITTING AND UNSTABLE GETTING UP FROM SITTING POSITION    Cardiac disease     Cardiac murmur     CTS (carpal tunnel syndrome) 2016    Surgery 2018    Fracture, foot 2013    History of chest pain     History of fractured pelvis     History of pancreatitis     Hypertension     Knee swelling 1968    Low back pain     Low back strain 2018    PT resolved it    Sleep apnea     Tear of meniscus of knee         Past Surgical History:   Procedure Laterality Date    CARPAL TUNNEL RELEASE Right 2018    CATARACT EXTRACTION, BILATERAL      CHOLECYSTECTOMY  2014    CORONARY STENT PLACEMENT  2015    mid LAD    FOOT SURGERY Left 1992    bone growth removal    HAND SURGERY  2018    Carpal tunnel    HEMORRHOIDECTOMY      JOINT REPLACEMENT Right 2003    knee    KNEE SURGERY Right     ACL repair    RETINAL DETACHMENT REPAIR Right     TOTAL KNEE ARTHROPLASTY Left 3/6/2020    Procedure: TOTAL KNEE ARTHROPLASTY;  Surgeon: Obed Ramos MD;  Location: [facility];  Service: Orthopedics;  Laterality: Left;    TRIGGER POINT INJECTION  Fo get 2016                        PT Assessment/Plan       Row Name 25 1300          PT Assessment    Assessment Comments Mr. Anguiano continues to verbalize improvements in his symptoms, with recent ability to lay on L side for 2-3 hours and sleep soundly. Pt continues to transition to recliner for remainder night's sleep. He reports  symptoms were slightly flared after last visit, unsure if related to extension exercises introduced. Trialed more flexion based exercises today including mountain climbers and shoulder ext with march, all with good tolerance. He continues to complete nerve flossing between sets/exercises, which assists in decreasing onset of nerve tingling into bottom of L foot. Continuing to answer all questions and concerns regarding his diagnosis and spine anatomy and physiology. Pt remains appropriate for skilled PT.  -DR        PT Plan    PT Plan Comments response to last session, consider marching at wall with SB, AR press? update HEP if continuing to do well  -DR               User Key  (r) = Recorded By, (t) = Taken By, (c) = Cosigned By      Initials Name Provider Type    Jose C Otero, PT Physical Therapist                       OP Exercises       Row Name 01/20/25 1200             Subjective    Subjective Comments Im doing well. I was able to lay on my left side and sleep soundly for about 2-3 hours  -DR         Total Minutes    48148 - PT Therapeutic Exercise Minutes 40  -DR         Exercise 1    Exercise Name 1 nustep  -DR      Cueing 1 Verbal  -DR      Time 1 5 mins  -DR         Exercise 2    Exercise Name 2 seated HS str  -DR      Cueing 2 Verbal;Demo  -DR      Reps 2 3  -DR      Time 2 20s  -DR         Exercise 3    Exercise Name 3 seated piriformis stretch/figure 4  -DR      Cueing 3 Verbal;Demo  -DR      Reps 3 3e  -DR      Time 3 20s  -DR         Exercise 5    Exercise Name 5 seated gentle sciatic nerve glide  -DR      Cueing 5 Verbal  -DR      Sets 5 3  -DR      Reps 5 10  -DR      Time 5 slowly working into it with each round  -DR      Additional Comments throughout session  -DR         Exercise 7    Exercise Name 7 march with shldr ext  -DR      Cueing 7 Verbal;Demo  -DR      Sets 7 2e  -DR      Reps 7 10  -DR      Time 7 GTB  -DR         Exercise 9    Exercise Name 9 STS with TA  -DR      Cueing 9  Verbal;Demo  -DR      Sets 9 2  -DR      Reps 9 10  -DR      Time 9 standard clinic chair  -DR      Additional Comments shoulder flexion with light pink ball to achieve more neutral posture  -DR         Exercise 10    Exercise Name 10 seated TrA  -DR      Cueing 10 Verbal;Demo  -DR      Reps 10 15  -DR      Time 10 5s  -DR      Additional Comments fingers medial to ASIS for tactile cues  -DR         Exercise 11    Exercise Name 11 single arm R shoulder row + trunk rotaton (small range)  -DR      Cueing 11 Verbal;Demo  -DR      Sets 11 2  -DR      Reps 11 10  -DR      Time 11 GTB  -DR      Additional Comments cues for proper form  -DR         Exercise 14    Exercise Name 14 mountain climbers  -DR      Cueing 14 Verbal;Demo  -DR      Sets 14 2e  -DR      Reps 14 10  -DR      Time 14 at barre  -DR                User Key  (r) = Recorded By, (t) = Taken By, (c) = Cosigned By      Initials Name Provider Type    Jose C Otero, PT Physical Therapist                                  PT OP Goals       Row Name 01/20/25 1200          PT Short Term Goals    STG Date to Achieve 01/17/25  -DR     STG 1 Pt. will be independent with initial HEP to improve self-management of condition.  -     STG 1 Progress Met  -DR     STG 2 Patient will demonstrate proper log roll mechanics with little to no cues when getting on/off treatment table to demonstrate improved mechanics and decreased strain on lumbar spine.  -     STG 2 Progress Met  -DR     STG 3 Pt will improve lumbar AROM (specifically L lateral flexion and L rotation) with no radiating symptoms down LLE for improved mobility and participation in recreational activities.  -     STG 3 Progress Partially Met  -     STG 4 Patient will improve laying tolerance from 30 min to >2 hours without radiating symptoms down LLE to L calf to improve quality of sleep.  -     STG 4 Progress Met  -DR        Long Term Goals    LTG Date to Achieve 02/16/25  -DR     LTG 1 Pt. will be  independent with advanced HEP to improve long term independence with self management of condition.  -DR ZULETAG 1 Progress Ongoing  -DR LORA 2 Pt. will score </= 6/50 on Modified Oswestry to indicate improved perception of disability.  -DR LORA 2 Progress Ongoing;Progressing  -DR LORA 3 Pt. will demonstrate proper TrA engagement in standing and dynamic movements to improve lumbopelvic stability.  -DR LORA 3 Progress Ongoing  -DR ZULETAG 4 Patient will improve ability to sleep through the night in his bed (vs recliner at original appt) without sleep disturbances related to back pain to improve overall sleep quality and quality of life.  -DR LORA 4 Progress Ongoing  -DR ZULETAG 5 Pt will return to previous frequency of swimming, >/= 15 laps in the pool, with no radiating symptoms down LLE to assist with greater participation in recreational activities.  -DR LORA 5 Progress Ongoing  -               User Key  (r) = Recorded By, (t) = Taken By, (c) = Cosigned By      Initials Name Provider Type    Jose C Otero, PT Physical Therapist                    Therapy Education  Given: HEP, Symptoms/condition management, Pain management, Posture/body mechanics  Program: Reinforced  How Provided: Verbal, Demonstration  Provided to: Patient  Level of Understanding: Verbalized              Time Calculation:   Start Time: 1247  Stop Time: 1327  Time Calculation (min): 40 min  Timed Charges  46472 - PT Therapeutic Exercise Minutes: 40  Total Minutes  Timed Charges Total Minutes: 40   Total Minutes: 40  Therapy Charges for Today       Code Description Service Date Service Provider Modifiers Qty    18657042581 HC PT THER PROC EA 15 MIN 1/20/2025 Jose C Monaco, PT GP 3                      Jose C Monaco, PT  1/20/2025

## 2025-01-21 ENCOUNTER — TRANSCRIBE ORDERS (OUTPATIENT)
Dept: PHYSICAL THERAPY | Facility: HOSPITAL | Age: 80
End: 2025-01-21
Payer: MEDICARE

## 2025-01-21 DIAGNOSIS — M54.50 LOW BACK PAIN POTENTIALLY ASSOCIATED WITH RADICULOPATHY: Primary | ICD-10-CM

## 2025-01-22 ENCOUNTER — HOSPITAL ENCOUNTER (OUTPATIENT)
Dept: PHYSICAL THERAPY | Facility: HOSPITAL | Age: 80
Setting detail: THERAPIES SERIES
Discharge: HOME | End: 2025-01-22
Payer: MEDICARE

## 2025-01-22 DIAGNOSIS — M54.42 CHRONIC MIDLINE LOW BACK PAIN WITH LEFT-SIDED SCIATICA: Primary | ICD-10-CM

## 2025-01-22 DIAGNOSIS — M79.662 PAIN OF LEFT CALF: ICD-10-CM

## 2025-01-22 DIAGNOSIS — G89.29 CHRONIC MIDLINE LOW BACK PAIN WITH LEFT-SIDED SCIATICA: Primary | ICD-10-CM

## 2025-01-22 PROCEDURE — 97110 THERAPEUTIC EXERCISES: CPT

## 2025-01-22 NOTE — THERAPY TREATMENT NOTE
Outpatient Physical Therapy Ortho Treatment Note  [facility]     Patient Name: Yunior Anguiano  : 1945  MRN: [id number]  Today's Date: 2025      Visit Date: 2025    Visit Dx:    ICD-10-CM ICD-9-CM   1. Chronic midline low back pain with left-sided sciatica  M54.42 724.2    G89.29 724.3     338.29   2. Pain of left calf  M79.662 729.5       Patient Active Problem List   Diagnosis    Primary osteoarthritis of knee    OA (osteoarthritis) of knee        Past Medical History:   Diagnosis Date    Arthritis     LEFT KNEE-GETS STIFF AFTER SITTING AND UNSTABLE GETTING UP FROM SITTING POSITION    Cardiac disease     Cardiac murmur     CTS (carpal tunnel syndrome) 2016    Surgery 2018    Fracture, foot 2013    History of chest pain     History of fractured pelvis     History of pancreatitis     Hypertension     Knee swelling 1968    Low back pain     Low back strain 2018    PT resolved it    Sleep apnea     Tear of meniscus of knee         Past Surgical History:   Procedure Laterality Date    CARPAL TUNNEL RELEASE Right 2018    CATARACT EXTRACTION, BILATERAL      CHOLECYSTECTOMY  2014    CORONARY STENT PLACEMENT  2015    mid LAD    FOOT SURGERY Left 1992    bone growth removal    HAND SURGERY  2018    Carpal tunnel    HEMORRHOIDECTOMY      JOINT REPLACEMENT Right 2003    knee    KNEE SURGERY Right     ACL repair    RETINAL DETACHMENT REPAIR Right     TOTAL KNEE ARTHROPLASTY Left 3/6/2020    Procedure: TOTAL KNEE ARTHROPLASTY;  Surgeon: Obed Ramos MD;  Location: [facility];  Service: Orthopedics;  Laterality: Left;    TRIGGER POINT INJECTION  Fo get 2016                        PT Assessment/Plan       Row Name 25 0900          PT Assessment    Assessment Comments Mr. Anguiano reports good tolerance to last session and no inc pain. He arrives to PT with 0/10 pain and states his sleep quality continues to slowly improve. Discussed pillow placement when laying on R side while  sleeping in attempt to reduce L posterolateral hip girdle tension leading to neural tension. Reintroduced AR press and added lateral stepping with good tolerance. Updated HEP, reviewed changes with patient and provided handout. Guillaume Barrettalaina remains a candidate for skilled PT.  -JS        PT Plan    PT Plan Comments marches at wall with SB, cat/cow to promote mild ext? repeated side bending at wall  -JS               User Key  (r) = Recorded By, (t) = Taken By, (c) = Cosigned By      Initials Name Provider Type    Rena Rodriguez, PT Physical Therapist                       OP Exercises       Row Name 01/22/25 0700             Subjective    Subjective Comments I didnt have any pain after last session and I am overall feeling really good  -JS         Subjective Pain    Able to rate subjective pain? yes  -JS      Pre-Treatment Pain Level 0  -JS         Total Minutes    74975 - PT Therapeutic Exercise Minutes 41  -JS         Exercise 1    Exercise Name 1 nustep  -JS      Cueing 1 Verbal  -JS      Time 1 5 mins  -JS         Exercise 2    Exercise Name 2 seated HS str  -JS      Cueing 2 Verbal;Demo  -JS      Reps 2 3  -JS      Time 2 20s  -JS      Additional Comments toes pointed down  -JS         Exercise 3    Exercise Name 3 seated piriformis stretch/figure 4  -JS      Cueing 3 Verbal;Demo  -JS      Reps 3 3e  -JS      Time 3 20s  -JS         Exercise 4    Exercise Name 4 AR press  -JS      Cueing 4 Verbal;Demo  -JS      Sets 4 1  -JS      Reps 4 10  -JS      Time 4 RTB  -JS      Additional Comments staggered stance, nerve glide between sets  -JS         Exercise 5    Exercise Name 5 seated gentle sciatic nerve glide  -JS      Cueing 5 Verbal  -JS      Sets 5 3  -JS      Reps 5 10  -JS      Time 5 slowly working into it with each round  -JS      Additional Comments throughout session  -JS         Exercise 6    Exercise Name 6 lateral stepping  -JS      Cueing 6 Verbal;Demo  -JS      Reps 6 3 laps  -JS       Time 6 RTB  -JS      Additional Comments cues for TA  -JS         Exercise 7    Exercise Name 7 march with shldr ext  -JS      Cueing 7 Verbal;Demo  -JS      Sets 7 2e  -JS      Reps 7 10  -JS      Time 7 GTB  -JS         Exercise 9    Exercise Name 9 STS with TA  -JS      Cueing 9 Verbal;Demo  -JS      Sets 9 2  -JS      Reps 9 10  -JS      Time 9 standard clinic chair  -JS      Additional Comments shoulder flexion with light pink ball to achieve more neutral posture  -JS         Exercise 10    Exercise Name 10 seated TrA  -JS      Cueing 10 Verbal;Demo  -JS      Reps 10 15  -JS      Time 10 5s  -JS         Exercise 11    Exercise Name 11 single arm R shoulder row + trunk rotaton (small range)  -JS      Cueing 11 Verbal;Demo  -JS      Sets 11 2  -JS      Reps 11 10  -JS      Time 11 GTB  -JS         Exercise 14    Exercise Name 14 mountain climbers  -JS      Cueing 14 Verbal;Demo  -JS      Sets 14 2e  -JS      Reps 14 10  -JS      Time 14 at barre  -JS      Additional Comments into mild hip ext  -JS                User Key  (r) = Recorded By, (t) = Taken By, (c) = Cosigned By      Initials Name Provider Type    Rena Rodriguez, PT Physical Therapist                                  PT OP Goals       Row Name 01/22/25 0700          PT Short Term Goals    STG Date to Achieve 01/17/25  -JS     STG 1 Pt. will be independent with initial HEP to improve self-management of condition.  -JS     STG 1 Progress Met  -JS     STG 2 Patient will demonstrate proper log roll mechanics with little to no cues when getting on/off treatment table to demonstrate improved mechanics and decreased strain on lumbar spine.  -JS     STG 2 Progress Met  -JS     STG 3 Pt will improve lumbar AROM (specifically L lateral flexion and L rotation) with no radiating symptoms down LLE for improved mobility and participation in recreational activities.  -JS     STG 3 Progress Partially Met  -JS     STG 4 Patient will improve laying tolerance from  30 min to >2 hours without radiating symptoms down LLE to L calf to improve quality of sleep.  -     STG 4 Progress Met  -        Long Term Goals    LTG Date to Achieve 02/16/25  -JS     LTG 1 Pt. will be independent with advanced HEP to improve long term independence with self management of condition.  -JS     LTG 1 Progress Ongoing  -JS     LTG 2 Pt. will score </= 6/50 on Modified Oswestry to indicate improved perception of disability.  -JS     LTG 2 Progress Ongoing;Progressing  -JS     LTG 3 Pt. will demonstrate proper TrA engagement in standing and dynamic movements to improve lumbopelvic stability.  -     LTG 3 Progress Ongoing  -     LTG 4 Patient will improve ability to sleep through the night in his bed (vs recliner at original appt) without sleep disturbances related to back pain to improve overall sleep quality and quality of life.  -     LTG 4 Progress Ongoing  -JS     LTG 5 Pt will return to previous frequency of swimming, >/= 15 laps in the pool, with no radiating symptoms down LLE to assist with greater participation in recreational activities.  -     LTG 5 Progress Ongoing  -               User Key  (r) = Recorded By, (t) = Taken By, (c) = Cosigned By      Initials Name Provider Type    Rena Rodriguez, PT Physical Therapist                    Therapy Education  Education Details: updated HEP  Given: HEP, Symptoms/condition management, Pain management, Posture/body mechanics  Program: Reinforced, Progressed  How Provided: Verbal, Demonstration, Written  Provided to: Patient  Level of Understanding: Verbalized, Demonstrated              Time Calculation:   Start Time: 0747  Stop Time: 0828  Time Calculation (min): 41 min  Timed Charges  86187 - PT Therapeutic Exercise Minutes: 41  Total Minutes  Timed Charges Total Minutes: 41   Total Minutes: 41  Therapy Charges for Today       Code Description Service Date Service Provider Modifiers Qty    41094658114 HC PT THER PROC EA 15 MIN  1/22/2025 Rena Schneider, PT GP, KX 3                      Rena Schneider, PT  1/22/2025

## 2025-01-27 ENCOUNTER — HOSPITAL ENCOUNTER (OUTPATIENT)
Dept: PHYSICAL THERAPY | Facility: HOSPITAL | Age: 80
Setting detail: THERAPIES SERIES
Discharge: HOME | End: 2025-01-27
Payer: MEDICARE

## 2025-01-27 DIAGNOSIS — G89.29 CHRONIC MIDLINE LOW BACK PAIN WITH LEFT-SIDED SCIATICA: Primary | ICD-10-CM

## 2025-01-27 DIAGNOSIS — M54.42 CHRONIC MIDLINE LOW BACK PAIN WITH LEFT-SIDED SCIATICA: Primary | ICD-10-CM

## 2025-01-27 DIAGNOSIS — M79.662 PAIN OF LEFT CALF: ICD-10-CM

## 2025-01-27 PROCEDURE — 97110 THERAPEUTIC EXERCISES: CPT

## 2025-01-27 NOTE — THERAPY TREATMENT NOTE
Outpatient Physical Therapy Ortho Treatment Note  [facility]     Patient Name: Yunior Anguiano  : 1945  MRN: [id number]  Today's Date: 2025      Visit Date: 2025    Visit Dx:    ICD-10-CM ICD-9-CM   1. Chronic midline low back pain with left-sided sciatica  M54.42 724.2    G89.29 724.3     338.29   2. Pain of left calf  M79.662 729.5       Patient Active Problem List   Diagnosis    Primary osteoarthritis of knee    OA (osteoarthritis) of knee        Past Medical History:   Diagnosis Date    Arthritis     LEFT KNEE-GETS STIFF AFTER SITTING AND UNSTABLE GETTING UP FROM SITTING POSITION    Cardiac disease     Cardiac murmur     CTS (carpal tunnel syndrome) 2016    Surgery 2018    Fracture, foot 2013    History of chest pain     History of fractured pelvis     History of pancreatitis     Hypertension     Knee swelling 1968    Low back pain     Low back strain 2018    PT resolved it    Sleep apnea     Tear of meniscus of knee         Past Surgical History:   Procedure Laterality Date    CARPAL TUNNEL RELEASE Right 2018    CATARACT EXTRACTION, BILATERAL      CHOLECYSTECTOMY  2014    CORONARY STENT PLACEMENT  2015    mid LAD    FOOT SURGERY Left 1992    bone growth removal    HAND SURGERY  2018    Carpal tunnel    HEMORRHOIDECTOMY      JOINT REPLACEMENT Right 2003    knee    KNEE SURGERY Right     ACL repair    RETINAL DETACHMENT REPAIR Right     TOTAL KNEE ARTHROPLASTY Left 3/6/2020    Procedure: TOTAL KNEE ARTHROPLASTY;  Surgeon: Obed Ramos MD;  Location: [facility];  Service: Orthopedics;  Laterality: Left;    TRIGGER POINT INJECTION  Fo get                         PT Assessment/Plan       Row Name 25 0700          PT Assessment    Assessment Comments Mr. Anguiano returns to PT with 0/10 pain, worse when sleeping. Pt did try pillow b/t knees which did seem to alleviate neural tension, however, continues to onset after 3-4 hours in bed. Full relief when transitioning  to recliner to sleep. Resumed previous therex, initiating forward and retro monster walks, cat cow with mild extension, and STS with weighted overhead press to further improve mobility and hip girdle strengthening for prevention of LBP. Pt only required one seated break to complete nerve glides after completion of AR press, which seems to be only exs that aggravates N/T into L calf. Otherwise, excellent tolerance to remaining of treatment. Reviewed expectations of recovery with diagnosis of chronic LBP and ongoing POC. Pt remains appropriate for skilled PT.  -DR        PT Plan    PT Plan Comments repeated side bending at wall, marches at wall with SB? update HEP.  -DR               User Key  (r) = Recorded By, (t) = Taken By, (c) = Cosigned By      Initials Name Provider Type    Jose C Otero, PT Physical Therapist                       OP Exercises       Row Name 01/27/25 0700             Subjective    Subjective Comments I was able to sleep for about 4 hours in bed on Friday. The pillow helped. Normally though its 3 hours and then I have to transition to recliner.  -DR         Subjective Pain    Able to rate subjective pain? yes  -DR      Pre-Treatment Pain Level 0  -DR         Total Minutes    91543 - PT Therapeutic Exercise Minutes 42  -DR         Exercise 1    Exercise Name 1 nustep  -DR      Cueing 1 Verbal  -DR      Time 1 5 mins  -DR         Exercise 2    Exercise Name 2 seated HS str  -DR      Cueing 2 Verbal;Demo  -DR      Reps 2 3  -DR      Time 2 20s  -DR      Additional Comments good tolerance to toes up  -DR         Exercise 3    Exercise Name 3 seated piriformis stretch/figure 4  -DR      Cueing 3 Verbal;Demo  -DR      Reps 3 3e  -DR      Time 3 20s  -DR         Exercise 4    Exercise Name 4 AR press  -DR      Cueing 4 Verbal;Demo  -DR      Sets 4 1  -DR      Reps 4 10  -DR      Time 4 RTB  -DR      Additional Comments staggered stance, nerve glide between sets; irritated LE N/T  -DR          Exercise 5    Exercise Name 5 seated gentle sciatic nerve glide  -DR      Cueing 5 Verbal  -DR      Sets 5 3  -DR      Reps 5 10  -DR      Time 5 slowly working into it with each round  -DR      Additional Comments throughout session  -DR         Exercise 6    Exercise Name 6 lateral stepping  -DR      Cueing 6 Verbal;Demo  -DR      Reps 6 3 laps  -DR      Time 6 RTB  -DR      Additional Comments cues for TA  -DR         Exercise 7    Exercise Name 7 march with shldr ext  -DR      Cueing 7 Verbal;Demo  -DR      Sets 7 2e  -DR      Reps 7 10  -DR      Time 7 GTB  -DR         Exercise 8    Exercise Name 8 cat/cow with mild ext  -DR      Cueing 8 Verbal;Demo  -DR      Sets 8 1  -DR      Reps 8 10  -DR      Additional Comments emphasis on controlling through each vertebrae  -DR         Exercise 9    Exercise Name 9 STS with TA  -DR      Cueing 9 Verbal;Demo  -DR      Sets 9 2  -DR      Reps 9 10  -DR      Time 9 standard clinic chair  -DR      Additional Comments shoulder flex B 3# DB to promote neutral posture  -DR         Exercise 10    Exercise Name 10 seated TrA  -DR      Cueing 10 Verbal;Demo  -DR      Reps 10 15  -DR      Time 10 5s  -DR         Exercise 12    Exercise Name 12 monster walks  -DR      Cueing 12 Verbal;Demo  -DR      Reps 12 3 laps  -DR      Time 12 f/b; RTB thighs  -DR      Additional Comments cued TA  -DR         Exercise 14    Exercise Name 14 mountain climbers  -DR      Cueing 14 Verbal;Demo  -DR      Sets 14 2e  -DR      Reps 14 10  -DR      Time 14 at barre  -DR      Additional Comments into mild hip ext  -DR                User Key  (r) = Recorded By, (t) = Taken By, (c) = Cosigned By      Initials Name Provider Type    Jose C Otero, PT Physical Therapist                                  PT OP Goals       Row Name 01/27/25 0700          PT Short Term Goals    STG Date to Achieve 01/17/25  -DR     STG 1 Pt. will be independent with initial HEP to improve self-management of condition.   -     STG 1 Progress Met  -     STG 2 Patient will demonstrate proper log roll mechanics with little to no cues when getting on/off treatment table to demonstrate improved mechanics and decreased strain on lumbar spine.  -DR     STG 2 Progress Met  -     STG 3 Pt will improve lumbar AROM (specifically L lateral flexion and L rotation) with no radiating symptoms down LLE for improved mobility and participation in recreational activities.  -DR     STG 3 Progress Partially Met  -     STG 4 Patient will improve laying tolerance from 30 min to >2 hours without radiating symptoms down LLE to L calf to improve quality of sleep.  -     STG 4 Progress Met  -        Long Term Goals    LTG Date to Achieve 02/16/25  -     LTG 1 Pt. will be independent with advanced HEP to improve long term independence with self management of condition.  -     LTG 1 Progress Ongoing  -     LTG 2 Pt. will score </= 6/50 on Modified Oswestry to indicate improved perception of disability.  -     LTG 2 Progress Ongoing;Progressing  -     LTG 3 Pt. will demonstrate proper TrA engagement in standing and dynamic movements to improve lumbopelvic stability.  -     LTG 3 Progress Ongoing  -     LTG 4 Patient will improve ability to sleep through the night in his bed (vs recliner at original appt) without sleep disturbances related to back pain to improve overall sleep quality and quality of life.  -     LTG 4 Progress Ongoing  -     LTG 5 Pt will return to previous frequency of swimming, >/= 15 laps in the pool, with no radiating symptoms down LLE to assist with greater participation in recreational activities.  -     LTG 5 Progress Ongoing  -               User Key  (r) = Recorded By, (t) = Taken By, (c) = Cosigned By      Initials Name Provider Type    Jose C Otero, PT Physical Therapist                    Therapy Education  Education Details: expected timeframe of recovery with chronic LBP, expectations of  PT  Given: Symptoms/condition management, Pain management  Program: Reinforced  How Provided: Verbal  Provided to: Patient  Level of Understanding: Verbalized              Time Calculation:   Start Time: 0732  Stop Time: 0814  Time Calculation (min): 42 min  Timed Charges  68783 - PT Therapeutic Exercise Minutes: 42  Total Minutes  Timed Charges Total Minutes: 42   Total Minutes: 42  Therapy Charges for Today       Code Description Service Date Service Provider Modifiers Qty    27676364390 HC PT THER PROC EA 15 MIN 1/27/2025 Jose C Monaco, PT GP 3                      Jose C Monaco, PT  1/27/2025

## 2025-01-29 ENCOUNTER — HOSPITAL ENCOUNTER (OUTPATIENT)
Dept: PHYSICAL THERAPY | Facility: HOSPITAL | Age: 80
Setting detail: THERAPIES SERIES
Discharge: HOME | End: 2025-01-29
Payer: MEDICARE

## 2025-01-29 DIAGNOSIS — M54.42 CHRONIC MIDLINE LOW BACK PAIN WITH LEFT-SIDED SCIATICA: Primary | ICD-10-CM

## 2025-01-29 DIAGNOSIS — G89.29 CHRONIC MIDLINE LOW BACK PAIN WITH LEFT-SIDED SCIATICA: Primary | ICD-10-CM

## 2025-01-29 DIAGNOSIS — M79.662 PAIN OF LEFT CALF: ICD-10-CM

## 2025-01-29 PROCEDURE — 97110 THERAPEUTIC EXERCISES: CPT

## 2025-01-29 NOTE — THERAPY TREATMENT NOTE
Outpatient Physical Therapy Ortho Treatment Note  [facility]     Patient Name: Yunior Anguiano  : 1945  MRN: [id number]  Today's Date: 2025      Visit Date: 2025    Visit Dx:    ICD-10-CM ICD-9-CM   1. Chronic midline low back pain with left-sided sciatica  M54.42 724.2    G89.29 724.3     338.29   2. Pain of left calf  M79.662 729.5       Patient Active Problem List   Diagnosis    Primary osteoarthritis of knee    OA (osteoarthritis) of knee        Past Medical History:   Diagnosis Date    Arthritis     LEFT KNEE-GETS STIFF AFTER SITTING AND UNSTABLE GETTING UP FROM SITTING POSITION    Cardiac disease     Cardiac murmur     CTS (carpal tunnel syndrome) 2016    Surgery 2018    Fracture, foot 2013    History of chest pain     History of fractured pelvis     History of pancreatitis     Hypertension     Knee swelling 1968    Low back pain     Low back strain 2018    PT resolved it    Sleep apnea     Tear of meniscus of knee         Past Surgical History:   Procedure Laterality Date    CARPAL TUNNEL RELEASE Right 2018    CATARACT EXTRACTION, BILATERAL      CHOLECYSTECTOMY  2014    CORONARY STENT PLACEMENT  2015    mid LAD    FOOT SURGERY Left 1992    bone growth removal    HAND SURGERY  2018    Carpal tunnel    HEMORRHOIDECTOMY      JOINT REPLACEMENT Right 2003    knee    KNEE SURGERY Right     ACL repair    RETINAL DETACHMENT REPAIR Right     TOTAL KNEE ARTHROPLASTY Left 3/6/2020    Procedure: TOTAL KNEE ARTHROPLASTY;  Surgeon: Obed Ramos MD;  Location: [facility];  Service: Orthopedics;  Laterality: Left;    TRIGGER POINT INJECTION  Fo get                         PT Assessment/Plan       Row Name 25 0700          PT Assessment    Assessment Comments Mr. Anguiano reports increased walking tolerance as he was able to walk for 1.5 miles without pain and mild lower leg stiffness. Despite improvement in walking abilities, he reports only being able to sleep in bed for  1.5 hours. He does continue to demo better tolerance with slow introduction to lumbar extension based activities. Reviewed cat/cow with increased lumbar extension, progressed mountain climbers with addition of hip extension and added standing hip extension with minimal to no inc in symptoms. He also tolerated second set of AR press without worsening symptoms. Updated HEP, reviewed changes with patient and provided handout. Yunior Anguiano remains a candidate for skilled PT.  -JS        PT Plan    PT Plan Comments diagonal squat chops? box stepping?  -JS               User Key  (r) = Recorded By, (t) = Taken By, (c) = Cosigned By      Initials Name Provider Type    Rena Rodriguez, PT Physical Therapist                       OP Exercises       Row Name 01/29/25 0700             Subjective    Subjective Comments I walked about 1.5 miles yesterday without pain and mild lower leg stiffness at the end. I was only able to sleep about 1.5 hours in the bed last night  -JS         Subjective Pain    Able to rate subjective pain? yes  -JS      Pre-Treatment Pain Level 0  -JS      Post-Treatment Pain Level 0  -JS         Total Minutes    52468 - PT Therapeutic Exercise Minutes 41  -JS         Exercise 1    Exercise Name 1 nustep  -JS      Cueing 1 Verbal  -JS      Time 1 5 mins  -JS         Exercise 2    Exercise Name 2 seated HS str  -JS      Cueing 2 Verbal;Demo  -JS      Reps 2 3  -JS      Time 2 20s  -JS         Exercise 3    Exercise Name 3 seated piriformis stretch/figure 4  -JS      Cueing 3 Verbal;Demo  -JS      Reps 3 3e  -JS      Time 3 20s  -JS         Exercise 4    Exercise Name 4 AR press  -JS      Cueing 4 Verbal;Demo  -JS      Sets 4 2  -JS      Reps 4 10  -JS      Time 4 RTB  -JS      Additional Comments staggered stance, nerve glide between sets; irritated LE N/T  -JS         Exercise 6    Exercise Name 6 lateral stepping  -JS      Cueing 6 Verbal;Demo  -JS      Reps 6 3 laps  -JS      Time 6 RTB  -JS       Additional Comments cues for TA  -JS         Exercise 8    Exercise Name 8 cat/cow with mild ext  -JS      Cueing 8 Verbal;Demo  -JS      Sets 8 2  -JS      Reps 8 10  -JS      Additional Comments emphasis on controlling through each vertebrae  -JS         Exercise 9    Exercise Name 9 STS with TA  -JS      Cueing 9 Verbal;Demo  -JS      Sets 9 2  -JS      Reps 9 10  -JS      Time 9 standard clinic chair  -JS      Additional Comments shoulder flex B 3# DB to promote neutral posture  -JS         Exercise 12    Exercise Name 12 monster walks  -JS      Cueing 12 Verbal;Demo  -JS      Reps 12 3 laps  -JS      Time 12 f/b; RTB thighs  -JS      Additional Comments cues for TA  -JS         Exercise 13    Exercise Name 13 repeated side bending (L elbow on wall)  -JS      Cueing 13 Verbal;Demo  -JS      Sets 13 2  -JS      Reps 13 10  -JS         Exercise 15    Exercise Name 15 SB marches  -JS      Cueing 15 Verbal;Demo  -JS      Sets 15 2  -JS      Reps 15 10  -JS      Time 15 first set alternating, second set 10 in a row same side + hip ext  -JS         Exercise 16    Exercise Name 16 standing hip ext  -JS      Cueing 16 Verbal;Demo  -JS      Sets 16 1  -JS      Reps 16 10e  -JS                User Key  (r) = Recorded By, (t) = Taken By, (c) = Cosigned By      Initials Name Provider Type    Rena Rodriguez, PT Physical Therapist                                  PT OP Goals       Row Name 01/29/25 0700          PT Short Term Goals    STG Date to Achieve 01/17/25  -JS     STG 1 Pt. will be independent with initial HEP to improve self-management of condition.  -JS     STG 1 Progress Met  -JS     STG 2 Patient will demonstrate proper log roll mechanics with little to no cues when getting on/off treatment table to demonstrate improved mechanics and decreased strain on lumbar spine.  -JS     STG 2 Progress Met  -JS     STG 3 Pt will improve lumbar AROM (specifically L lateral flexion and L rotation) with no radiating  symptoms down LLE for improved mobility and participation in recreational activities.  -     STG 3 Progress Partially Met  -     STG 4 Patient will improve laying tolerance from 30 min to >2 hours without radiating symptoms down LLE to L calf to improve quality of sleep.  -     STG 4 Progress Met  -JS        Long Term Goals    LTG Date to Achieve 02/16/25  -     LTG 1 Pt. will be independent with advanced HEP to improve long term independence with self management of condition.  -JS     LTG 1 Progress Ongoing  -JS     LTG 2 Pt. will score </= 6/50 on Modified Oswestry to indicate improved perception of disability.  -JS     LTG 2 Progress Ongoing;Progressing  -JS     LTG 3 Pt. will demonstrate proper TrA engagement in standing and dynamic movements to improve lumbopelvic stability.  -     LTG 3 Progress Ongoing  -     LTG 4 Patient will improve ability to sleep through the night in his bed (vs recliner at original appt) without sleep disturbances related to back pain to improve overall sleep quality and quality of life.  -     LTG 4 Progress Ongoing  -     LTG 5 Pt will return to previous frequency of swimming, >/= 15 laps in the pool, with no radiating symptoms down LLE to assist with greater participation in recreational activities.  -     LTG 5 Progress Ongoing  -               User Key  (r) = Recorded By, (t) = Taken By, (c) = Cosigned By      Initials Name Provider Type    Rena Rodriguez, PT Physical Therapist                    Therapy Education  Education Details: updated HEP  Given: HEP, Symptoms/condition management, Pain management, Posture/body mechanics  Program: Reinforced, Progressed  How Provided: Verbal, Demonstration, Written  Provided to: Patient  Level of Understanding: Verbalized, Demonstrated              Time Calculation:   Start Time: 0705  Stop Time: 0746  Time Calculation (min): 41 min  Timed Charges  98578 - PT Therapeutic Exercise Minutes: 41  Total Minutes  Timed  Charges Total Minutes: 41   Total Minutes: 41  Therapy Charges for Today       Code Description Service Date Service Provider Modifiers Qty    43926922511 HC PT THER PROC EA 15 MIN 1/29/2025 Rena Schneider, PT GP 3                      Rena Schneider, PT  1/29/2025

## 2025-01-30 ENCOUNTER — TELEPHONE (OUTPATIENT)
Dept: ORTHOPEDIC SURGERY | Facility: CLINIC | Age: 80
End: 2025-01-30

## 2025-01-30 NOTE — TELEPHONE ENCOUNTER
Caller: Yunior Anguiano    Relationship to patient: Self    Best call back number: [phone]    Chief complaint: LOWER BACK PAIN    Type of visit: FOLLOW UP    Requested date: ASAP     Additional notes:PT CONTINUES TO HAVE LOWER BACK PAIN AFTER THE EPIDURAL AND PHYSICAL THERAPY.. PT WANTS TO KNOW IF HE CAN SCHEDULE WITH RADHA MACEDO.. PLEASE ADVISE..

## 2025-02-04 ENCOUNTER — HOSPITAL ENCOUNTER (OUTPATIENT)
Dept: PHYSICAL THERAPY | Facility: HOSPITAL | Age: 80
Setting detail: THERAPIES SERIES
Discharge: HOME | End: 2025-02-04
Payer: MEDICARE

## 2025-02-04 DIAGNOSIS — G89.29 CHRONIC MIDLINE LOW BACK PAIN WITH LEFT-SIDED SCIATICA: Primary | ICD-10-CM

## 2025-02-04 DIAGNOSIS — M54.42 CHRONIC MIDLINE LOW BACK PAIN WITH LEFT-SIDED SCIATICA: Primary | ICD-10-CM

## 2025-02-04 DIAGNOSIS — M79.662 PAIN OF LEFT CALF: ICD-10-CM

## 2025-02-04 PROCEDURE — 97110 THERAPEUTIC EXERCISES: CPT

## 2025-02-04 NOTE — THERAPY TREATMENT NOTE
Outpatient Physical Therapy Ortho Treatment Note  [facility]     Patient Name: Yunior Anguiano  : 1945  MRN: [id number]  Today's Date: 2025      Visit Date: 2025    Visit Dx:    ICD-10-CM ICD-9-CM   1. Chronic midline low back pain with left-sided sciatica  M54.42 724.2    G89.29 724.3     338.29   2. Pain of left calf  M79.662 729.5       Patient Active Problem List   Diagnosis    Primary osteoarthritis of knee    OA (osteoarthritis) of knee        Past Medical History:   Diagnosis Date    Arthritis     LEFT KNEE-GETS STIFF AFTER SITTING AND UNSTABLE GETTING UP FROM SITTING POSITION    Cardiac disease     Cardiac murmur     CTS (carpal tunnel syndrome) 2016    Surgery 2018    Fracture, foot 2013    History of chest pain     History of fractured pelvis     History of pancreatitis     Hypertension     Knee swelling 1968    Low back pain     Low back strain 2018    PT resolved it    Sleep apnea     Tear of meniscus of knee 1968        Past Surgical History:   Procedure Laterality Date    CARPAL TUNNEL RELEASE Right 2018    CATARACT EXTRACTION, BILATERAL      CHOLECYSTECTOMY  2014    CORONARY STENT PLACEMENT  2015    mid LAD    FOOT SURGERY Left 1992    bone growth removal    HAND SURGERY  2018    Carpal tunnel    HEMORRHOIDECTOMY      JOINT REPLACEMENT Right 2003    knee    KNEE SURGERY Right     ACL repair    RETINAL DETACHMENT REPAIR Right     TOTAL KNEE ARTHROPLASTY Left 3/6/2020    Procedure: TOTAL KNEE ARTHROPLASTY;  Surgeon: Obed Ramos MD;  Location: [facility];  Service: Orthopedics;  Laterality: Left;    TRIGGER POINT INJECTION  Fo get                         PT Assessment/Plan       Row Name 25 0800          PT Assessment    Assessment Comments Mr. Anguiano continues to report steady improvement in symptoms since the start of PT with ongoing complaint of difficulty sleeping. He is scheduled to see spinal specialist in two weeks. Initiated session discussing HEP  in detail and all questions/concerns addressed by PT. Patient with slight increase in symptoms throughout session and he continues to benefit from seated sciatic nerve floss. Will continue to finalize HEP over next 1-2 visits in preparation to transition to independent management.  -JS        PT Plan    PT Plan Comments finalize HEP, consider diagonal squat chops  -JS               User Key  (r) = Recorded By, (t) = Taken By, (c) = Cosigned By      Initials Name Provider Type    Rena Rodriguez, PT Physical Therapist                       OP Exercises       Row Name 02/04/25 0800             Subjective    Subjective Comments THingsa are going really good I am still just having pain while I sleep.  -JS         Subjective Pain    Able to rate subjective pain? yes  -JS      Pre-Treatment Pain Level 0  -JS      Post-Treatment Pain Level 0  -JS         Total Minutes    96894 - PT Therapeutic Exercise Minutes 40  -JS         Exercise 1    Exercise Name 1 nustep  -JS      Cueing 1 Verbal  -JS      Time 1 5 mins  -JS         Exercise 2    Exercise Name 2 seated HS str  -JS      Cueing 2 Verbal;Demo  -JS      Reps 2 3  -JS      Time 2 20s  -JS         Exercise 3    Exercise Name 3 seated piriformis stretch/figure 4  -JS      Cueing 3 Verbal;Demo  -JS      Reps 3 3e  -JS      Time 3 20s  -JS         Exercise 4    Exercise Name 4 AR press  -JS      Cueing 4 Verbal;Demo  -JS      Sets 4 2  -JS      Reps 4 10  -JS      Time 4 RTB  -JS      Additional Comments staggered stance, nerve glide between sets; irritated LE N/T  -JS         Exercise 5    Exercise Name 5 seated gentle sciatic nerve glide  -JS      Cueing 5 Verbal  -JS      Sets 5 3  -JS      Reps 5 10  -JS      Time 5 slowly working into it with each round  -JS      Additional Comments throughout session  -JS         Exercise 6    Exercise Name 6 lateral stepping  -JS      Cueing 6 Verbal;Demo  -JS      Reps 6 3 laps  -JS      Time 6 RTB, ankles  -JS         Exercise  8    Exercise Name 8 cat/cow with mild ext  -JS      Cueing 8 Verbal;Demo  -JS      Sets 8 2  -JS      Reps 8 10  -JS      Additional Comments emphasis on controlling through each vertebrae  -JS         Exercise 9    Exercise Name 9 STS with TA  -JS      Cueing 9 Verbal;Demo  -JS      Sets 9 2  -JS      Reps 9 10  -JS      Time 9 standard clinic chair  -JS      Additional Comments shoulder flex B 3# DB to promote neutral posture  -JS         Exercise 12    Exercise Name 12 monster walks  -JS      Cueing 12 Verbal;Demo  -JS      Reps 12 3 laps  -JS      Time 12 f/b; RTB ankles  -JS      Additional Comments cues for TA  -JS         Exercise 13    Exercise Name 13 repeated side bending (L elbow on wall)  -JS      Cueing 13 Verbal;Demo  -JS      Sets 13 2  -JS      Reps 13 10  -JS         Exercise 15    Exercise Name 15 SB marches  -JS      Cueing 15 Verbal;Demo  -JS      Sets 15 2  -JS      Reps 15 10  -JS      Time 15 first set alternating, second set 10 in a row same side + hip ext  -JS         Exercise 16    Exercise Name 16 standing hip ext  -JS      Cueing 16 Verbal;Demo  -JS      Sets 16 1  -JS      Reps 16 10e  -JS                User Key  (r) = Recorded By, (t) = Taken By, (c) = Cosigned By      Initials Name Provider Type    Rena Rodriguez, PT Physical Therapist                                  PT OP Goals       Row Name 02/04/25 0800          PT Short Term Goals    STG Date to Achieve 01/17/25  -JS     STG 1 Pt. will be independent with initial HEP to improve self-management of condition.  -JS     STG 1 Progress Met  -JS     STG 2 Patient will demonstrate proper log roll mechanics with little to no cues when getting on/off treatment table to demonstrate improved mechanics and decreased strain on lumbar spine.  -JS     STG 2 Progress Met  -JS     STG 3 Pt will improve lumbar AROM (specifically L lateral flexion and L rotation) with no radiating symptoms down LLE for improved mobility and participation in  recreational activities.  -     STG 3 Progress Partially Met  -     STG 4 Patient will improve laying tolerance from 30 min to >2 hours without radiating symptoms down LLE to L calf to improve quality of sleep.  -     STG 4 Progress Met  -        Long Term Goals    LTG Date to Achieve 02/16/25  -JS     LTG 1 Pt. will be independent with advanced HEP to improve long term independence with self management of condition.  -JS     LTG 1 Progress Ongoing  -JS     LTG 2 Pt. will score </= 6/50 on Modified Oswestry to indicate improved perception of disability.  -JS     LTG 2 Progress Ongoing;Progressing  -JS     LTG 3 Pt. will demonstrate proper TrA engagement in standing and dynamic movements to improve lumbopelvic stability.  -     LTG 3 Progress Ongoing  -     LTG 4 Patient will improve ability to sleep through the night in his bed (vs recliner at original appt) without sleep disturbances related to back pain to improve overall sleep quality and quality of life.  -     LTG 4 Progress Ongoing  -     LTG 5 Pt will return to previous frequency of swimming, >/= 15 laps in the pool, with no radiating symptoms down LLE to assist with greater participation in recreational activities.  -     LTG 5 Progress Ongoing  -               User Key  (r) = Recorded By, (t) = Taken By, (c) = Cosigned By      Initials Name Provider Type    Rena Rodriguez, PT Physical Therapist                    Therapy Education  Education Details: discussed various questions/concerns regarding HEP and exercise routine outside of pT  Given: HEP, Symptoms/condition management, Pain management, Posture/body mechanics  Program: Reinforced  How Provided: Verbal, Demonstration, Written  Provided to: Patient  Level of Understanding: Verbalized, Demonstrated  72022 - PT Self Care/Mgmt Minutes: 10              Time Calculation:   Start Time: 0750  Stop Time: 0830  Time Calculation (min): 40 min  Timed Charges  03377 - PT Therapeutic  Exercise Minutes: 40  70331 - PT Self Care/Mgmt Minutes: 10  Total Minutes  Timed Charges Total Minutes: 50   Total Minutes: 50  Therapy Charges for Today       Code Description Service Date Service Provider Modifiers Qty    [id number]  PT THER PROC EA 15 MIN 2/4/2025 Rena Schneider, PT GP 3                      Rena Schneider, PT  2/4/2025

## 2025-02-11 ENCOUNTER — HOSPITAL ENCOUNTER (OUTPATIENT)
Dept: PHYSICAL THERAPY | Facility: HOSPITAL | Age: 80
Setting detail: THERAPIES SERIES
Discharge: HOME | End: 2025-02-11
Payer: MEDICARE

## 2025-02-11 DIAGNOSIS — M54.42 CHRONIC MIDLINE LOW BACK PAIN WITH LEFT-SIDED SCIATICA: Primary | ICD-10-CM

## 2025-02-11 DIAGNOSIS — M79.662 PAIN OF LEFT CALF: ICD-10-CM

## 2025-02-11 DIAGNOSIS — G89.29 CHRONIC MIDLINE LOW BACK PAIN WITH LEFT-SIDED SCIATICA: Primary | ICD-10-CM

## 2025-02-11 PROCEDURE — 97110 THERAPEUTIC EXERCISES: CPT

## 2025-02-11 NOTE — THERAPY TREATMENT NOTE
Outpatient Physical Therapy Ortho Treatment Note  [facility]     Patient Name: Yunior Anguiano  : 1945  MRN: [id number]  Today's Date: 2025      Visit Date: 2025    Visit Dx:    ICD-10-CM ICD-9-CM   1. Chronic midline low back pain with left-sided sciatica  M54.42 724.2    G89.29 724.3     338.29   2. Pain of left calf  M79.662 729.5       Patient Active Problem List   Diagnosis    Primary osteoarthritis of knee    OA (osteoarthritis) of knee        Past Medical History:   Diagnosis Date    Arthritis     LEFT KNEE-GETS STIFF AFTER SITTING AND UNSTABLE GETTING UP FROM SITTING POSITION    Cardiac disease     Cardiac murmur     CTS (carpal tunnel syndrome) 2016    Surgery 2018    Fracture, foot 2013    History of chest pain     History of fractured pelvis     History of pancreatitis     Hypertension     Knee swelling 1968    Low back pain     Low back strain 2018    PT resolved it    Sleep apnea     Tear of meniscus of knee 1968        Past Surgical History:   Procedure Laterality Date    CARPAL TUNNEL RELEASE Right 2018    CATARACT EXTRACTION, BILATERAL      CHOLECYSTECTOMY  2014    CORONARY STENT PLACEMENT  2015    mid LAD    FOOT SURGERY Left 1992    bone growth removal    HAND SURGERY  2018    Carpal tunnel    HEMORRHOIDECTOMY      JOINT REPLACEMENT Right 2003    knee    KNEE SURGERY Right     ACL repair    RETINAL DETACHMENT REPAIR Right     TOTAL KNEE ARTHROPLASTY Left 3/6/2020    Procedure: TOTAL KNEE ARTHROPLASTY;  Surgeon: Obed Ramos MD;  Location: [facility];  Service: Orthopedics;  Laterality: Left;    TRIGGER POINT INJECTION  Fo get                         PT Assessment/Plan       Row Name 25 0900          PT Assessment    Assessment Comments Mr. Anguiano returns to PT with reports of ongoing improvement in sleep quality as he was able to sleep for ~ 5 hours before returning to his recliner. Added diagonal squat chops to address squat + reach mechanics with  functional tasks. Patient with very mild onset of LLE radicular symptoms that quickly improved with seated sciatic nerve flossing. Will plan to transition to independent management next visit. Yunior VANG Jesusalaina remains a candidate for skilled PT.  -JS        PT Plan    PT Plan Comments anticipate d/c, finalize HEP, review squat chops?  -JS               User Key  (r) = Recorded By, (t) = Taken By, (c) = Cosigned By      Initials Name Provider Type    JS Rena Schneider, PT Physical Therapist                       OP Exercises       Row Name 02/11/25 0900             Subjective    Subjective Comments I was able to sleep 5 hours in my bed last night  -JS         Subjective Pain    Able to rate subjective pain? yes  -JS      Pre-Treatment Pain Level 0  -JS      Post-Treatment Pain Level 0  -JS         Total Minutes    07149 - PT Therapeutic Exercise Minutes 43  -JS         Exercise 1    Exercise Name 1 nustep  -JS      Cueing 1 Verbal  -JS      Time 1 5 mins  -JS         Exercise 2    Exercise Name 2 seated HS str  -JS      Cueing 2 Verbal;Demo  -JS      Reps 2 3  -JS      Time 2 20s  -JS         Exercise 3    Exercise Name 3 seated piriformis stretch/figure 4  -JS      Cueing 3 Verbal;Demo  -JS      Reps 3 3e  -JS      Time 3 20s  -JS         Exercise 4    Exercise Name 4 AR press  -JS      Cueing 4 Verbal;Demo  -JS      Sets 4 2  -JS      Reps 4 10  -JS      Time 4 GTB  -JS      Additional Comments staggered stance, nerve glide between sets; irritated LE N/T  -JS         Exercise 5    Exercise Name 5 seated gentle sciatic nerve glide  -JS      Cueing 5 Verbal  -JS      Sets 5 3  -JS      Reps 5 10  -JS      Time 5 slowly working into it with each round  -JS      Additional Comments throughout session  -JS         Exercise 6    Exercise Name 6 lateral stepping  -JS      Cueing 6 Verbal;Demo  -JS      Reps 6 4 laps  -JS      Time 6 RTB, ankles  -JS         Exercise 8    Exercise Name 8 cat/cow with mild ext  -JS       Cueing 8 Verbal;Demo  -JS      Sets 8 2  -JS      Reps 8 10  -JS      Additional Comments emphasis on controlling through each vertebrae  -JS         Exercise 9    Exercise Name 9 STS with TA  -JS      Cueing 9 Verbal;Demo  -JS      Sets 9 2  -JS      Reps 9 10  -JS      Time 9 standard clinic chair  -JS      Additional Comments shoulder flex B L3 ball to promote neutral posture  -JS         Exercise 11    Exercise Name 11 squat diagonal chops  -JS      Cueing 11 Verbal;Demo  -JS      Sets 11 1  -JS      Reps 11 15e  -JS      Time 11 L3  -JS         Exercise 12    Exercise Name 12 monster walks  -JS      Cueing 12 Verbal;Demo  -JS      Reps 12 4 laps  -JS      Time 12 f/b; RTB ankles  -JS      Additional Comments cues for TA  -JS         Exercise 13    Exercise Name 13 repeated side bending (L elbow on wall)  -JS      Cueing 13 Verbal;Demo  -JS      Sets 13 2  -JS      Reps 13 10  -JS         Exercise 15    Exercise Name 15 SB marches  -JS      Cueing 15 Verbal;Demo  -JS      Sets 15 2  -JS      Reps 15 10  -JS      Time 15 first set alternating, second set 10 in a row same side + hip ext  -JS         Exercise 16    Exercise Name 16 standing hip ext  -JS      Cueing 16 Verbal;Demo  -JS      Sets 16 2  -JS      Reps 16 10e  -JS      Time 16 RTB  -JS                User Key  (r) = Recorded By, (t) = Taken By, (c) = Cosigned By      Initials Name Provider Type    Rena Rodriguez, PT Physical Therapist                                  PT OP Goals       Row Name 02/11/25 0900          PT Short Term Goals    STG Date to Achieve 01/17/25  -JS     STG 1 Pt. will be independent with initial HEP to improve self-management of condition.  -JS     STG 1 Progress Met  -JS     STG 2 Patient will demonstrate proper log roll mechanics with little to no cues when getting on/off treatment table to demonstrate improved mechanics and decreased strain on lumbar spine.  -JS     STG 2 Progress Met  -JS     STG 3 Pt will improve  lumbar AROM (specifically L lateral flexion and L rotation) with no radiating symptoms down LLE for improved mobility and participation in recreational activities.  -JS     STG 3 Progress Partially Met  -JS     STG 4 Patient will improve laying tolerance from 30 min to >2 hours without radiating symptoms down LLE to L calf to improve quality of sleep.  -     STG 4 Progress Met  -JS        Long Term Goals    LTG Date to Achieve 02/16/25  -JS     LTG 1 Pt. will be independent with advanced HEP to improve long term independence with self management of condition.  -JS     LTG 1 Progress Ongoing  -JS     LTG 2 Pt. will score </= 6/50 on Modified Oswestry to indicate improved perception of disability.  -JS     LTG 2 Progress Ongoing;Progressing  -JS     LTG 3 Pt. will demonstrate proper TrA engagement in standing and dynamic movements to improve lumbopelvic stability.  -JS     LTG 3 Progress Ongoing  -JS     LTG 4 Patient will improve ability to sleep through the night in his bed (vs recliner at original appt) without sleep disturbances related to back pain to improve overall sleep quality and quality of life.  -     LTG 4 Progress Ongoing  -JS     LTG 5 Pt will return to previous frequency of swimming, >/= 15 laps in the pool, with no radiating symptoms down LLE to assist with greater participation in recreational activities.  -     LTG 5 Progress Ongoing  -               User Key  (r) = Recorded By, (t) = Taken By, (c) = Cosigned By      Initials Name Provider Type    Rena Rodriguez, PT Physical Therapist                    Therapy Education  Education Details: diagonal squat reach/chop mechanics  Given: Posture/body mechanics, Symptoms/condition management  Program: New  How Provided: Verbal, Demonstration  Provided to: Patient  Level of Understanding: Verbalized, Demonstrated              Time Calculation:   Start Time: 0925  Stop Time: 1008  Time Calculation (min): 43 min  Timed Charges  85450 - PT  Therapeutic Exercise Minutes: 43  Total Minutes  Timed Charges Total Minutes: 43   Total Minutes: 43  Therapy Charges for Today       Code Description Service Date Service Provider Modifiers Qty    78089704527 HC PT THER PROC EA 15 MIN 2/11/2025 Rena Schneider, PT GP 3                      Rena Schneider, PT  2/11/2025

## 2025-02-18 ENCOUNTER — HOSPITAL ENCOUNTER (OUTPATIENT)
Dept: PHYSICAL THERAPY | Facility: HOSPITAL | Age: 80
Setting detail: THERAPIES SERIES
Discharge: HOME | End: 2025-02-18
Payer: MEDICARE

## 2025-02-18 DIAGNOSIS — M79.662 PAIN OF LEFT CALF: ICD-10-CM

## 2025-02-18 DIAGNOSIS — G89.29 CHRONIC MIDLINE LOW BACK PAIN WITH LEFT-SIDED SCIATICA: Primary | ICD-10-CM

## 2025-02-18 DIAGNOSIS — M54.42 CHRONIC MIDLINE LOW BACK PAIN WITH LEFT-SIDED SCIATICA: Primary | ICD-10-CM

## 2025-02-18 PROCEDURE — 97110 THERAPEUTIC EXERCISES: CPT

## 2025-02-18 NOTE — THERAPY DISCHARGE NOTE
Outpatient Physical Therapy Ortho Treatment Note/Discharge Summary  [facility]     Patient Name: Yunior Anguiano  : 1945  MRN: [id number]  Today's Date: 2025      Visit Date: 2025    Visit Dx:    ICD-10-CM ICD-9-CM   1. Chronic midline low back pain with left-sided sciatica  M54.42 724.2    G89.29 724.3     338.29   2. Pain of left calf  M79.662 729.5       Patient Active Problem List   Diagnosis    Primary osteoarthritis of knee    OA (osteoarthritis) of knee        Past Medical History:   Diagnosis Date    Arthritis     LEFT KNEE-GETS STIFF AFTER SITTING AND UNSTABLE GETTING UP FROM SITTING POSITION    Cardiac disease     Cardiac murmur     CTS (carpal tunnel syndrome) 2016    Surgery 2018    Fracture, foot 2013    History of chest pain     History of fractured pelvis     History of pancreatitis     Hypertension     Knee swelling 1968    Low back pain     Low back strain 2018    PT resolved it    Sleep apnea     Tear of meniscus of knee 1968        Past Surgical History:   Procedure Laterality Date    CARPAL TUNNEL RELEASE Right 2018    CATARACT EXTRACTION, BILATERAL      CHOLECYSTECTOMY  2014    CORONARY STENT PLACEMENT  2015    mid LAD    FOOT SURGERY Left 1992    bone growth removal    HAND SURGERY  2018    Carpal tunnel    HEMORRHOIDECTOMY      JOINT REPLACEMENT Right 2003    knee    KNEE SURGERY Right     ACL repair    RETINAL DETACHMENT REPAIR Right     TOTAL KNEE ARTHROPLASTY Left 3/6/2020    Procedure: TOTAL KNEE ARTHROPLASTY;  Surgeon: Obed Ramos MD;  Location: [facility];  Service: Orthopedics;  Laterality: Left;    TRIGGER POINT INJECTION  Fo get 2016        PT Ortho       Row Name 25 0700       Lumbar ROM Screen- Lower Quarter Clearing    Lumbar Flexion Normal  -JS    Lumbar Extension Impaired  50% dec  -JS    Lumbar Lateral Flexion Normal  -JS    Lumbar Rotation Normal  -JS              User Key  (r) = Recorded By, (t) = Taken By, (c) = Cosigned By       Initials Name Provider Type    Rena Rodriguez PT Physical Therapist                                 PT Assessment/Plan       Row Name 02/18/25 0700          PT Assessment    Assessment Comments Yunior Anguiano has been seen for 13 physical therapy sessions for LBP with L sided radiculopathy. Patient reports overall 65% improvement since the start of PT. He reports significant improvement in his ability to complete ADLs/household chores with minimal to no pain. Following CHANO on 1/2/25 patient experienced significant improvement in pain. His primary complaint continues to involve sleeping tolerance in supine position. At initial evaluation, he was unable to tolerate supine position and now he can sleep for ~ 2-3 hours on average and 4-5 hours at max before onset of N/T into L lateral gastroc. Returned to swimming for 4 laps leading to 7/10 lateral lower leg pain. He reports his symptoms lasted ~ 10 minutes before resolving. Patient typically responds well to seated sciatic nerve glides leading to reduction in N/T of L lateral calf.Treatment has included therapeutic exercise, manual therapy, and patient education with home exercise program. Progress to physical therapy goals is good. Pt has met 4/4 STG and 2/5 LTG. His MAIRA score improved from 26% disability to 16% disability, where 0% represents no disability. Time spent discussing advanced HEP and appropriate progressions/modifications to make based on response following discharge and optimal frequency/duration to complete HEP over next several weeks-months. Patient verbalized understanding. He was discharged to an independent HEP and provided patient education to self-manage condition.  -PORTIA     Please refer to paper survey for additional self-reported information Yes  -JS        PT Plan    PT Plan Comments discharged  -PORTIA               User Key  (r) = Recorded By, (t) = Taken By, (c) = Cosigned By      Initials Name Provider Type    Rena Rodriguez, PT  Physical Therapist                         OP Exercises       Row Name 02/18/25 0700             Subjective    Subjective Comments I am still doing overall better but just having trouble sleeping  -JS         Subjective Pain    Able to rate subjective pain? yes  -JS      Pre-Treatment Pain Level 0  -JS      Post-Treatment Pain Level 0  -JS         Total Minutes    85718 - PT Therapeutic Exercise Minutes 38  -JS         Exercise 1    Exercise Name 1 nustep  -JS      Cueing 1 Verbal  -JS      Time 1 5 mins  -JS         Exercise 7    Exercise Name 7 march with shldr ext  -JS      Cueing 7 Verbal;Demo  -JS      Sets 7 2e  -JS      Reps 7 10  -JS      Time 7 GTB  -JS         Exercise 9    Exercise Name 9 STS with TA  -JS      Cueing 9 Verbal;Demo  -JS      Sets 9 2  -JS      Reps 9 10  -JS      Time 9 standard clinic chair  -JS      Additional Comments shoulder flex B L3 ball to promote neutral posture  -JS         Exercise 10    Exercise Name 10 Time spent filling out survey, taking lumbar ROM, discussing goals/POC, sleep quality, and advanced HEP  -JS      Time 10 15 mins  -JS         Exercise 11    Exercise Name 11 squat diagonal chops  -JS      Cueing 11 Verbal;Demo  -JS      Sets 11 1  -JS      Reps 11 15e  -JS      Time 11 L3  -JS                User Key  (r) = Recorded By, (t) = Taken By, (c) = Cosigned By      Initials Name Provider Type    Rena Rodriguez, PT Physical Therapist                                    PT OP Goals       Row Name 02/18/25 0700          PT Short Term Goals    STG Date to Achieve 01/17/25  -JS     STG 1 Pt. will be independent with initial HEP to improve self-management of condition.  -JS     STG 1 Progress Met  -JS     STG 2 Patient will demonstrate proper log roll mechanics with little to no cues when getting on/off treatment table to demonstrate improved mechanics and decreased strain on lumbar spine.  -JS     STG 2 Progress Met  -JS     STG 3 Pt will improve lumbar AROM  (specifically L lateral flexion and L rotation) with no radiating symptoms down LLE for improved mobility and participation in recreational activities.  -JS     STG 3 Progress Met  -JS     STG 3 Progress Comments denies N/T into LE  -JS     STG 4 Patient will improve laying tolerance from 30 min to >2 hours without radiating symptoms down LLE to L calf to improve quality of sleep.  -JS     STG 4 Progress Met  -JS        Long Term Goals    LTG Date to Achieve 02/16/25  -JS     LTG 1 Pt. will be independent with advanced HEP to improve long term independence with self management of condition.  -JS     LTG 1 Progress Met  -JS     LTG 2 Pt. will score </= 6/50 on Modified Oswestry to indicate improved perception of disability.  -JS     LTG 2 Progress Not Met  -JS     LTG 2 Progress Comments 8/50  -JS     LTG 3 Pt. will demonstrate proper TrA engagement in standing and dynamic movements to improve lumbopelvic stability.  -JS     LTG 3 Progress Met  -JS     LTG 4 Patient will improve ability to sleep through the night in his bed (vs recliner at original appt) without sleep disturbances related to back pain to improve overall sleep quality and quality of life.  -JS     LTG 4 Progress Not Met  -JS     LTG 4 Progress Comments ~2-3 hrs/night on average, 4-5 hours max  -JS     LTG 5 Pt will return to previous frequency of swimming, >/= 15 laps in the pool, with no radiating symptoms down LLE to assist with greater participation in recreational activities.  -JS     LTG 5 Progress Not Met  -JS     LTG 5 Progress Comments 4 laps  -               User Key  (r) = Recorded By, (t) = Taken By, (c) = Cosigned By      Initials Name Provider Type    Rena Rodriguez, PT Physical Therapist                    Therapy Education  Education Details: finalized HEP, discussed various progression/modifications based on response following discharge  Given: HEP, Symptoms/condition management, Pain management, Posture/body  mechanics  Program: Reinforced, Progressed  How Provided: Verbal, Demonstration, Written  Provided to: Patient  Level of Understanding: Verbalized, Demonstrated    Outcome Measure Options: Modified Oswestry  Modified Oswestry  Modified Oswestry Score/Comments: 8/50 = 16%      Time Calculation:   Start Time: 0745  Stop Time: 0823  Time Calculation (min): 38 min  Timed Charges  17186 - PT Therapeutic Exercise Minutes: 38  Total Minutes  Timed Charges Total Minutes: 38   Total Minutes: 38  Therapy Charges for Today       Code Description Service Date Service Provider Modifiers Qty    49573340691 HC PT THER PROC EA 15 MIN 2/18/2025 Rena Schneider, PT GP 3            PT G-Codes  Outcome Measure Options: Modified Oswestry  Modified Oswestry Score/Comments: 8/50 = 16%     OP PT Discharge Summary  Date of Discharge: 02/18/25  Reason for Discharge: Maximum functional potential achieved  Outcomes Achieved: Patient able to partially acheive established goals  Discharge Destination: Home with home program  Discharge Instructions/Additional Comments: following up with spinal specalist      Rena Schneider, PT  2/18/2025

## 2025-02-19 NOTE — PROGRESS NOTES
Patient Name: Yunior Anguiano   YOB: 1945  Referring Primary Care Physician: Yunior Huertas PA-C      Chief Complaint:    Chief Complaint   Patient presents with    Lumbar Spine - Initial Evaluation, Pain        Previous Treatment:   [facility] per [facility]LL    PT for back pian? Yes   Was PT effective? Yes     MRI:   11/20/2024 - MRI of L-Spine W/O ()     Pain Management:   01/02/2025 - [facility] PAIN MGT - [facility]    Back Pain  This is a chronic problem. The current episode started more than 1 month ago. The problem occurs intermittently. The problem has been comes and goes since onset. Pain location: left mid calf pain referring down into foot. The quality of the pain is described as stabbing (pins). The pain is at a severity of 2/10. Pain severity now: mild - severe. The symptoms are aggravated by lying down. Associated symptoms include leg pain, numbness and tingling. Pertinent negatives include no weakness. (LLE) Treatments tried: CHANO. The treatment provided mild relief.        HPI:  Yunior Anguiano is a 79 y.o. male who presents to [facility] for evaluation of above complaints.  Primarily complains of left calf pain worse at night.  He says lying flat on his back aggravates the symptoms.  Pain started around October 2024 without incident.  He says he was asleep and woke up with severe pain.  He had another episode about a week later and presented to ER to rule out DVT.  He is also aggravated by swimming.  He is an avid swimmer, but has not done so since symptom onset.  He is otherwise very active and likes to golf and is the primary caregiver of his wife.  He says throughout the day, he really does not notice the leg symptoms as much and does not have any back pain to speak of.  This is an established patient to the practice, new to me.  He is unaccompanied today.  Prior pertinent records were reviewed.    PFSH:  See attached    ROS: As per HPI,  "otherwise negative    Objective:      79 y.o. male  Body mass index is 29.56 kg/m²., 90.8 kg (200 lb 3.2 oz), @HT@  Vitals:    02/21/25 0835   Temp: 98.2 °F (36.8 °C)     Pain Score    02/21/25 0835   PainSc: 2   Comment: 7/10 at worse   PainLoc: Leg            Spine Musculoskeletal Exam    Gait    Gait is normal.    Inspection    Coronal balance: no imbalance    Sagittal balance: no imbalance    Strength    Thoracolumbar    Thoracolumbar motor exam is normal.       Sensory    Thoracolumbar    Thoracolumbar sensation is normal.    Reflexes    Right      Quadriceps: 1/4      Achilles: 1/4     Left      Quadriceps: 1/4      Achilles: 1/4    Special Tests    Thoracolumbar      Right      SLR: no back or leg pain      Left      SLR: no back or leg pain    General      Constitutional: well-developed and well-nourished    Scleral icterus: no    Labored breathing: no    Psychiatric: normal mood and affect and no acute distress    Neurological: alert and oriented x3    Skin: intact        IMAGING:     No imaging in office today.  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 There is no  evidence of a focal herniation. See above.  2.  There is mild enlargement of the infrarenal abdominal aorta. There  is also enlargement of the common iliac arteries bilaterally more  prominent on the left. The left common iliac artery measures  approximately 1.7 cm in diameter on the CT examination of the abdomen  pelvis performed on 3/12/2014, now 2.1 cm in diameter\".  Patient had been contacted previously to follow-up with PCP regarding the vascular findings.    Assessment:           Diagnoses and all orders for this " visit:    1. Low back pain potentially associated with radiculopathy (Primary)  -     gabapentin (NEURONTIN) 100 MG capsule; Take 1 capsule by mouth Every Night.  Dispense: 30 capsule; Refill: 1    2. Spondylolisthesis of lumbar region  -     gabapentin (NEURONTIN) 100 MG capsule; Take 1 capsule by mouth Every Night.  Dispense: 30 capsule; Refill: 1             Plan:  We had a very lengthy discussion today regarding the MRI results.  Although he does have stenosis at L4-5, he really does not describe claudication.  He does get pain in the calf and below.  He previously was on gabapentin for similar symptoms including restless leg syndrome.  He did well with the gabapentin.  He would like to try that approach again as he is having difficulty sleeping as well since the pain is worse at night.  We will start him on 100 mg at night and give him 1 refill.  We did discuss today that we do not manage medications long-term so he can discuss with his PCP their willingness to take over and perhaps increase the dose depending on effect.  We did also discuss that he may be better off in [facility].  He previously has had 1 epidural at the hospital, he had a reaction afterwards although is uncertain if it was related to the steroid or lidocaine.  He has tolerated oral steroids in the past, so may be more indicative of a lidocaine reaction, however he says he develops or rashes fairly easily.  We discussed activity modifications including different ways of sleeping and placing pillow between his knees, under the knees are at his back while sleeping.  He is currently sleeping in a recliner and would like to make it back to his bed eventually.  For now he was encouraged to keep up with therapy exercises, ease back into swimming by doing some water aerobics or something walking through the pool until the back pain has subsided to the point he feels like he can swim again.  We also discussed the importance  of keeping the core strong.  For now we will plan on follow-up as needed as he is not interested in surgical intervention and based on his symptoms, would not be the next step in treatment.  If he wants to try another epidural, I would encourage him to do this through comprehensive pain management at [facility] or possibly [facility] as they may also be willing to take over the gabapentin.  Over 35 minutes reviewing records, reviewing imaging, exam, counseling and discussing treatment options and plan.  Return if symptoms worsen or fail to improve.    EMR Dragon/Transcription Disclaimer:   Much of this encounter note is an electronic transcription/translation of spoken language to printed text. The electronic translation of spoken language may permit erroneous, or at times, nonsensical words or phrases to be inadvertently transcribed; Although I have reviewed the note for such errors, some may still exist.  Red flags have been discussed at this or previous visits to include but not limited weakness in extremities, worsening pain that does not respond to conservative treatment and bowel or bladder dysfunction. These are reasons to present to ER and patient has been informed.    The diagnosis(es), natural history, pathophysiology and treatment for diagnosis(es) were discussed. Opportunity given and questions answered. Biomechanics of pertinent body areas discussed.    EXERCISES:  Advice on benefits of, and types of regular/moderate exercise pertaining to diagnosis.  Continue HEP. For back or neck pain, recommend pilates and or yoga as tolerated. Generally it is best to start any new exercise under the guidance of a  or therapist.   MEDICATIONS:  When prescribe, the risks, benefits, warnings,side effects and alternatives of medications discussed. Advised against long term use of narcotics.   PAIN CONTROL:  Cold, heat, OTC lidocaine patches and/or ointment as needed. Avoid direct skin contact with ice. Ice  15-20 minutes 3-4 times daily as needed. For SI joint pain, recommend ice bath in water about 50 degrees for 5 consecutive days, add ice slowly to help with adjustment and may cover with warm towel or robe to help with cold tolerance. If using lidocaine, do not apply heat in conjunction as this can cause a burn.   MEDICAL RECORDS reviewed from other provider(s) for past and current medical history pertinent to this visit..

## 2025-02-21 ENCOUNTER — OFFICE VISIT (OUTPATIENT)
Dept: ORTHOPEDIC SURGERY | Facility: CLINIC | Age: 80
End: 2025-02-21
Payer: MEDICARE

## 2025-02-21 VITALS — TEMPERATURE: 98.2 F | HEIGHT: 69 IN | BODY MASS INDEX: 29.65 KG/M2 | WEIGHT: 200.2 LBS

## 2025-02-21 DIAGNOSIS — M54.50 LOW BACK PAIN POTENTIALLY ASSOCIATED WITH RADICULOPATHY: Primary | ICD-10-CM

## 2025-02-21 DIAGNOSIS — M43.16 SPONDYLOLISTHESIS OF LUMBAR REGION: ICD-10-CM

## 2025-02-21 PROCEDURE — 99214 OFFICE O/P EST MOD 30 MIN: CPT | Performed by: NURSE PRACTITIONER

## 2025-02-21 PROCEDURE — 1159F MED LIST DOCD IN RCRD: CPT | Performed by: NURSE PRACTITIONER

## 2025-02-21 PROCEDURE — 1160F RVW MEDS BY RX/DR IN RCRD: CPT | Performed by: NURSE PRACTITIONER

## 2025-02-21 RX ORDER — GABAPENTIN 100 MG/1
100 CAPSULE ORAL NIGHTLY
Qty: 30 CAPSULE | Refills: 1 | Status: SHIPPED | OUTPATIENT
Start: 2025-02-21

## 2025-02-21 RX ORDER — DIPHENOXYLATE HYDROCHLORIDE AND ATROPINE SULFATE 2.5; .025 MG/1; MG/1
TABLET ORAL
COMMUNITY
Start: 2024-10-23

## 2025-02-21 RX ORDER — PSYLLIUM HUSK 0.4 G
CAPSULE ORAL
COMMUNITY
Start: 2024-10-23

## 2025-05-20 ENCOUNTER — OFFICE VISIT (OUTPATIENT)
Dept: ORTHOPEDIC SURGERY | Facility: CLINIC | Age: 80
End: 2025-05-20
Payer: MEDICARE

## 2025-05-20 VITALS — BODY MASS INDEX: 29.55 KG/M2 | TEMPERATURE: 98 F | WEIGHT: 199.5 LBS | HEIGHT: 69 IN

## 2025-05-20 DIAGNOSIS — R52 PAIN: Primary | ICD-10-CM

## 2025-05-20 DIAGNOSIS — Z96.652 TOTAL KNEE REPLACEMENT STATUS, LEFT: ICD-10-CM

## 2025-05-20 DIAGNOSIS — Z96.651 TOTAL KNEE REPLACEMENT STATUS, RIGHT: ICD-10-CM

## 2025-05-20 PROCEDURE — 99213 OFFICE O/P EST LOW 20 MIN: CPT | Performed by: NURSE PRACTITIONER

## 2025-05-20 NOTE — PROGRESS NOTES
Patient: Yunior Anguiano  YOB: 1945 79 y.o. male  Medical Record Number: [id number]    Chief Complaints:   Chief Complaint   Patient presents with    Left Knee - Follow-up    Right Knee - Follow-up       History of Present Illness:Yunior Anguiano is a 79 y.o. male who presents for follow-up for right total knee replacement he had that done in 2003 by Dr. Obrien he has been doing great denies any pain swelling or instability, he does have a history of polyethylene wear.  In addition had left total knee replacement done by Dr. Ramos about 5 years ago left knee is doing great    Allergies:   Allergies   Allergen Reactions    Amlodipine Besylate Anaphylaxis and Hives    Cefdinir Hives     rash    Losartan Itching     Itching and it stopped with he stopped the losartan    Statins Other (See Comments)     ACHES cannot take   Muscles get WEAK    Turmeric Hives and Rash    Hydrocodone Provider Review Needed    Cortisone Rash     lumbar epidural injection    Erythromycin Base Nausea And Vomiting     vomits       Medications:   Current Outpatient Medications   Medication Sig Dispense Refill    aspirin 81 MG EC tablet Take 1 tablet by mouth Daily.      Calcium Carb-Cholecalciferol (Calcium 1000 + D) 1000-20 MG-MCG tablet       gabapentin (NEURONTIN) 100 MG capsule Take 1 capsule by mouth Every Night. 30 capsule 1    hydrALAZINE (APRESOLINE) 10 MG tablet Take 1 tablet by mouth 3 (Three) Times a Day.      multivitamin (MULTI-VITAMIN DAILY PO)       ZETIA 10 MG tablet Take 1 tablet by mouth Every Night.  5     No current facility-administered medications for this visit.         The following portions of the patient's history were reviewed and updated as appropriate: allergies, current medications, past family history, past medical history, past social history, past surgical history and problem list.    Review of Systems:   14 point review of systems was performed. All systems negative except pertinent  "positives/negatives listed in HPI above    Physical Exam:   Vitals:    05/20/25 1257   Temp: 98 °F (36.7 °C)   Weight: 90.5 kg (199 lb 8 oz)   Height: 175.3 cm (69\")   PainSc: 0-No pain       General: A and O x 3, ASA, NAD    Skin clear no unusual lesions noted  Bilateral knees patient has no appreciable effusion excellent range of motion no instability calf soft and nontender      Radiology:  Xrays 3views (ap,lateral, sunrise) bilateral knees were ordered and reviewed secondary to previous surgery show well-placed well-positioned bilateral total knee replacements he does have signs of polyethylene wear noted right knee but compared to views are unchanged    Assessment/Plan: 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

## (undated) DEVICE — APPL DURAPREP IODOPHOR APL 26ML

## (undated) DEVICE — PREMIUM WET SKIN PREP TRAY: Brand: MEDLINE INDUSTRIES, INC.

## (undated) DEVICE — UNDERCAST PADDING: Brand: DEROYAL

## (undated) DEVICE — 3M™ IOBAN™ 2 ANTIMICROBIAL INCISE DRAPE 6640EZ: Brand: IOBAN™ 2

## (undated) DEVICE — STERILE PATIENT PROTECTIVE PAD FOR IMP® KNEE POSITIONERS & COHESIVE WRAP (10 / CASE): Brand: DE MAYO KNEE POSITIONER®

## (undated) DEVICE — GLV SURG PREMIERPRO ORTHO LTX PF SZ7.5 BRN

## (undated) DEVICE — KT DRN EVAC WND PVC PCH WTROC RND 10F400

## (undated) DEVICE — PREP SOL POVIDONE/IODINE BT 4OZ

## (undated) DEVICE — ZIPPERED TOGA, 2X LARGE: Brand: FLYTE, SURGICOOL

## (undated) DEVICE — DRSNG SURESITE WNDW 2.38X2.75

## (undated) DEVICE — GLV SURG SENSICARE PI PF LF 7 GRN STRL

## (undated) DEVICE — SOL NACL 0.9PCT 100ML SGL

## (undated) DEVICE — BNDG ELAS ELITE V/CLOSE 6IN 5YD LF STRL

## (undated) DEVICE — GLV SURG SENSICARE W/ALOE PF LF 7.5 STRL

## (undated) DEVICE — PK KN TOTL 40

## (undated) DEVICE — GLV SURG SENSICARE W/ALOE PF LF 8 STRL

## (undated) DEVICE — MAT FLR ABSORBENT LG 4FT 10 2.5FT

## (undated) DEVICE — NEEDLE, QUINCKE 22GX3.5": Brand: MEDLINE INDUSTRIES, INC.

## (undated) DEVICE — DUAL CUT SAGITTAL BLADE

## (undated) DEVICE — SUT VIC 0 CT1 36IN J946H

## (undated) DEVICE — GLV SURG SENSICARE PI MIC PF SZ7 LF STRL

## (undated) DEVICE — SUT VIC 1 CT1 36IN J947H

## (undated) DEVICE — MEDI-VAC YANKAUER SUCTION HANDLE W/BULBOUS TIP: Brand: CARDINAL HEALTH

## (undated) DEVICE — STPLR SKIN VISISTAT WD 35CT